# Patient Record
Sex: MALE | Race: WHITE | Employment: OTHER | URBAN - METROPOLITAN AREA
[De-identification: names, ages, dates, MRNs, and addresses within clinical notes are randomized per-mention and may not be internally consistent; named-entity substitution may affect disease eponyms.]

---

## 2018-06-25 ENCOUNTER — APPOINTMENT (OUTPATIENT)
Dept: GENERAL RADIOLOGY | Age: 63
DRG: 974 | End: 2018-06-25
Payer: MEDICARE

## 2018-06-25 ENCOUNTER — APPOINTMENT (OUTPATIENT)
Dept: CT IMAGING | Age: 63
DRG: 974 | End: 2018-06-25
Payer: MEDICARE

## 2018-06-25 ENCOUNTER — HOSPITAL ENCOUNTER (INPATIENT)
Age: 63
LOS: 2 days | Discharge: ANOTHER HOSPITAL | DRG: 974 | End: 2018-06-28
Attending: EMERGENCY MEDICINE | Admitting: INTERNAL MEDICINE
Payer: MEDICARE

## 2018-06-25 DIAGNOSIS — E78.5 HYPERLIPIDEMIA, UNSPECIFIED HYPERLIPIDEMIA TYPE: ICD-10-CM

## 2018-06-25 DIAGNOSIS — N28.9 RENAL INSUFFICIENCY: ICD-10-CM

## 2018-06-25 DIAGNOSIS — J18.9 COMMUNITY ACQUIRED PNEUMONIA, UNSPECIFIED LATERALITY: ICD-10-CM

## 2018-06-25 DIAGNOSIS — N40.0 PROSTATE ENLARGEMENT: ICD-10-CM

## 2018-06-25 DIAGNOSIS — B20 HIV (HUMAN IMMUNODEFICIENCY VIRUS INFECTION) (HCC): ICD-10-CM

## 2018-06-25 DIAGNOSIS — A41.9 SEPSIS, DUE TO UNSPECIFIED ORGANISM: Primary | ICD-10-CM

## 2018-06-25 LAB
ALBUMIN SERPL-MCNC: 4.6 G/DL (ref 3.5–5.2)
ALP BLD-CCNC: 71 U/L (ref 40–129)
ALT SERPL-CCNC: 18 U/L (ref 0–40)
ANION GAP SERPL CALCULATED.3IONS-SCNC: 14 MMOL/L (ref 7–16)
AST SERPL-CCNC: 22 U/L (ref 0–39)
BASOPHILS ABSOLUTE: 0.03 E9/L (ref 0–0.2)
BASOPHILS RELATIVE PERCENT: 0.5 % (ref 0–2)
BILIRUB SERPL-MCNC: 0.6 MG/DL (ref 0–1.2)
BUN BLDV-MCNC: 16 MG/DL (ref 8–23)
CALCIUM SERPL-MCNC: 8.7 MG/DL (ref 8.6–10.2)
CHLORIDE BLD-SCNC: 101 MMOL/L (ref 98–107)
CO2: 23 MMOL/L (ref 22–29)
CREAT SERPL-MCNC: 1.6 MG/DL (ref 0.7–1.2)
EKG ATRIAL RATE: 106 BPM
EKG P AXIS: 5 DEGREES
EKG P-R INTERVAL: 176 MS
EKG Q-T INTERVAL: 314 MS
EKG QRS DURATION: 82 MS
EKG QTC CALCULATION (BAZETT): 417 MS
EKG R AXIS: 50 DEGREES
EKG T AXIS: 33 DEGREES
EKG VENTRICULAR RATE: 106 BPM
EOSINOPHILS ABSOLUTE: 0.02 E9/L (ref 0.05–0.5)
EOSINOPHILS RELATIVE PERCENT: 0.3 % (ref 0–6)
GFR AFRICAN AMERICAN: 53
GFR NON-AFRICAN AMERICAN: 44 ML/MIN/1.73
GLUCOSE BLD-MCNC: 95 MG/DL (ref 74–109)
HCT VFR BLD CALC: 43.6 % (ref 37–54)
HEMOGLOBIN: 15.2 G/DL (ref 12.5–16.5)
IMMATURE GRANULOCYTES #: 0.02 E9/L
IMMATURE GRANULOCYTES %: 0.3 % (ref 0–5)
INR BLD: 2.5
LACTIC ACID, SEPSIS: 0.9 MMOL/L (ref 0.5–1.9)
LIPASE: 27 U/L (ref 13–60)
LYMPHOCYTES ABSOLUTE: 1.08 E9/L (ref 1.5–4)
LYMPHOCYTES RELATIVE PERCENT: 18.4 % (ref 20–42)
MCH RBC QN AUTO: 34.9 PG (ref 26–35)
MCHC RBC AUTO-ENTMCNC: 34.9 % (ref 32–34.5)
MCV RBC AUTO: 100 FL (ref 80–99.9)
MONOCYTES ABSOLUTE: 0.48 E9/L (ref 0.1–0.95)
MONOCYTES RELATIVE PERCENT: 8.2 % (ref 2–12)
NEUTROPHILS ABSOLUTE: 4.23 E9/L (ref 1.8–7.3)
NEUTROPHILS RELATIVE PERCENT: 72.3 % (ref 43–80)
PDW BLD-RTO: 14.1 FL (ref 11.5–15)
PLATELET # BLD: 152 E9/L (ref 130–450)
PMV BLD AUTO: 8.8 FL (ref 7–12)
POTASSIUM REFLEX MAGNESIUM: 3.8 MMOL/L (ref 3.5–5)
PRO-BNP: 63 PG/ML (ref 0–125)
PROTHROMBIN TIME: 28.2 SEC (ref 9.3–12.4)
RBC # BLD: 4.36 E12/L (ref 3.8–5.8)
SODIUM BLD-SCNC: 138 MMOL/L (ref 132–146)
TOTAL PROTEIN: 8.2 G/DL (ref 6.4–8.3)
TROPONIN: <0.01 NG/ML (ref 0–0.03)
WBC # BLD: 5.9 E9/L (ref 4.5–11.5)

## 2018-06-25 PROCEDURE — 80053 COMPREHEN METABOLIC PANEL: CPT

## 2018-06-25 PROCEDURE — 71275 CT ANGIOGRAPHY CHEST: CPT

## 2018-06-25 PROCEDURE — 85025 COMPLETE CBC W/AUTO DIFF WBC: CPT

## 2018-06-25 PROCEDURE — 93005 ELECTROCARDIOGRAM TRACING: CPT | Performed by: EMERGENCY MEDICINE

## 2018-06-25 PROCEDURE — 6370000000 HC RX 637 (ALT 250 FOR IP): Performed by: EMERGENCY MEDICINE

## 2018-06-25 PROCEDURE — 2700000000 HC OXYGEN THERAPY PER DAY

## 2018-06-25 PROCEDURE — 2500000003 HC RX 250 WO HCPCS: Performed by: EMERGENCY MEDICINE

## 2018-06-25 PROCEDURE — 94640 AIRWAY INHALATION TREATMENT: CPT

## 2018-06-25 PROCEDURE — 87040 BLOOD CULTURE FOR BACTERIA: CPT

## 2018-06-25 PROCEDURE — 71045 X-RAY EXAM CHEST 1 VIEW: CPT

## 2018-06-25 PROCEDURE — 83690 ASSAY OF LIPASE: CPT

## 2018-06-25 PROCEDURE — 99285 EMERGENCY DEPT VISIT HI MDM: CPT

## 2018-06-25 PROCEDURE — 2580000003 HC RX 258: Performed by: EMERGENCY MEDICINE

## 2018-06-25 PROCEDURE — 85610 PROTHROMBIN TIME: CPT

## 2018-06-25 PROCEDURE — 36415 COLL VENOUS BLD VENIPUNCTURE: CPT

## 2018-06-25 PROCEDURE — 6360000004 HC RX CONTRAST MEDICATION: Performed by: RADIOLOGY

## 2018-06-25 PROCEDURE — 83605 ASSAY OF LACTIC ACID: CPT

## 2018-06-25 PROCEDURE — 83880 ASSAY OF NATRIURETIC PEPTIDE: CPT

## 2018-06-25 PROCEDURE — 84484 ASSAY OF TROPONIN QUANT: CPT

## 2018-06-25 RX ORDER — SODIUM CHLORIDE 9 MG/ML
1000 INJECTION, SOLUTION INTRAVENOUS ONCE
Status: COMPLETED | OUTPATIENT
Start: 2018-06-25 | End: 2018-06-25

## 2018-06-25 RX ORDER — IPRATROPIUM BROMIDE AND ALBUTEROL SULFATE 2.5; .5 MG/3ML; MG/3ML
2 SOLUTION RESPIRATORY (INHALATION) ONCE
Status: COMPLETED | OUTPATIENT
Start: 2018-06-25 | End: 2018-06-25

## 2018-06-25 RX ORDER — ACETAMINOPHEN 500 MG
1000 TABLET ORAL ONCE
Status: COMPLETED | OUTPATIENT
Start: 2018-06-25 | End: 2018-06-25

## 2018-06-25 RX ORDER — LIDOCAINE HYDROCHLORIDE 10 MG/ML
5 INJECTION, SOLUTION EPIDURAL; INFILTRATION; INTRACAUDAL; PERINEURAL ONCE
Status: COMPLETED | OUTPATIENT
Start: 2018-06-25 | End: 2018-06-25

## 2018-06-25 RX ADMIN — IOPAMIDOL 80 ML: 755 INJECTION, SOLUTION INTRAVENOUS at 23:23

## 2018-06-25 RX ADMIN — SODIUM CHLORIDE 1000 ML: 9 INJECTION, SOLUTION INTRAVENOUS at 21:46

## 2018-06-25 RX ADMIN — LIDOCAINE HYDROCHLORIDE 5 ML: 10 INJECTION, SOLUTION EPIDURAL; INFILTRATION; INTRACAUDAL; PERINEURAL at 21:45

## 2018-06-25 RX ADMIN — ACETAMINOPHEN 1000 MG: 500 TABLET, FILM COATED ORAL at 21:46

## 2018-06-25 RX ADMIN — IPRATROPIUM BROMIDE AND ALBUTEROL SULFATE 2 AMPULE: .5; 3 SOLUTION RESPIRATORY (INHALATION) at 21:45

## 2018-06-25 ASSESSMENT — ENCOUNTER SYMPTOMS
COUGH: 1
EYE PAIN: 0
DIARRHEA: 0
EYE REDNESS: 0
WHEEZING: 0
BACK PAIN: 1
SORE THROAT: 0
ABDOMINAL PAIN: 1
SHORTNESS OF BREATH: 1
VOMITING: 0
NAUSEA: 0
EYE DISCHARGE: 0
SINUS PRESSURE: 0

## 2018-06-25 ASSESSMENT — PAIN DESCRIPTION - PROGRESSION: CLINICAL_PROGRESSION: GRADUALLY IMPROVING

## 2018-06-25 ASSESSMENT — PAIN SCALES - GENERAL
PAINLEVEL_OUTOF10: 5
PAINLEVEL_OUTOF10: 3

## 2018-06-25 ASSESSMENT — PAIN DESCRIPTION - DESCRIPTORS: DESCRIPTORS: HEADACHE

## 2018-06-25 ASSESSMENT — PAIN DESCRIPTION - PAIN TYPE: TYPE: ACUTE PAIN

## 2018-06-25 ASSESSMENT — PAIN DESCRIPTION - LOCATION: LOCATION: HEAD

## 2018-06-26 PROBLEM — A41.9 SEPSIS (HCC): Status: ACTIVE | Noted: 2018-06-26

## 2018-06-26 LAB
BACTERIA: NORMAL /HPF
BILIRUBIN URINE: NEGATIVE
BLOOD, URINE: ABNORMAL
CLARITY: CLEAR
COLOR: YELLOW
EPITHELIAL CELLS, UA: NORMAL /HPF
FILM ARRAY ADENOVIRUS: ABNORMAL
FILM ARRAY BORDETELLA PERTUSSIS: ABNORMAL
FILM ARRAY CHLAMYDOPHILIA PNEUMONIAE: ABNORMAL
FILM ARRAY CORONAVIRUS 229E: ABNORMAL
FILM ARRAY CORONAVIRUS HKU1: ABNORMAL
FILM ARRAY CORONAVIRUS NL63: ABNORMAL
FILM ARRAY CORONAVIRUS OC43: ABNORMAL
FILM ARRAY INFLUENZA A VIRUS 09H1: ABNORMAL
FILM ARRAY INFLUENZA A VIRUS H1: ABNORMAL
FILM ARRAY INFLUENZA A VIRUS H3: ABNORMAL
FILM ARRAY INFLUENZA A VIRUS: ABNORMAL
FILM ARRAY INFLUENZA B: ABNORMAL
FILM ARRAY METAPNEUMOVIRUS: ABNORMAL
FILM ARRAY MYCOPLASMA PNEUMONIAE: ABNORMAL
FILM ARRAY PARAINFLUENZA VIRUS 1: ABNORMAL
FILM ARRAY PARAINFLUENZA VIRUS 2: ABNORMAL
FILM ARRAY PARAINFLUENZA VIRUS 4: ABNORMAL
FILM ARRAY RESPIRATORY SYNCITIAL VIRUS: ABNORMAL
FILM ARRAY RHINOVIRUS/ENTEROVIRUS: ABNORMAL
GLUCOSE URINE: NEGATIVE MG/DL
INR BLD: 3
KETONES, URINE: NEGATIVE MG/DL
L. PNEUMOPHILA SEROGP 1 UR AG: NORMAL
LEUKOCYTE ESTERASE, URINE: NEGATIVE
NITRITE, URINE: NEGATIVE
ORGANISM: ABNORMAL
PH UA: 5.5 (ref 5–9)
PROTEIN UA: NEGATIVE MG/DL
PROTHROMBIN TIME: 34.3 SEC (ref 9.3–12.4)
RBC UA: NORMAL /HPF (ref 0–2)
SPECIFIC GRAVITY UA: 1.01 (ref 1–1.03)
STREP PNEUMONIAE ANTIGEN, URINE: NORMAL
UROBILINOGEN, URINE: 0.2 E.U./DL
WBC UA: NORMAL /HPF (ref 0–5)

## 2018-06-26 PROCEDURE — 94640 AIRWAY INHALATION TREATMENT: CPT

## 2018-06-26 PROCEDURE — 2580000003 HC RX 258

## 2018-06-26 PROCEDURE — 81001 URINALYSIS AUTO W/SCOPE: CPT

## 2018-06-26 PROCEDURE — 87503 INFLUENZA DNA AMP PROB ADDL: CPT

## 2018-06-26 PROCEDURE — 87486 CHLMYD PNEUM DNA AMP PROBE: CPT

## 2018-06-26 PROCEDURE — 6370000000 HC RX 637 (ALT 250 FOR IP): Performed by: INTERNAL MEDICINE

## 2018-06-26 PROCEDURE — 6360000002 HC RX W HCPCS: Performed by: INTERNAL MEDICINE

## 2018-06-26 PROCEDURE — 87581 M.PNEUMON DNA AMP PROBE: CPT

## 2018-06-26 PROCEDURE — 2700000000 HC OXYGEN THERAPY PER DAY

## 2018-06-26 PROCEDURE — 6360000002 HC RX W HCPCS: Performed by: EMERGENCY MEDICINE

## 2018-06-26 PROCEDURE — 87088 URINE BACTERIA CULTURE: CPT

## 2018-06-26 PROCEDURE — 87798 DETECT AGENT NOS DNA AMP: CPT

## 2018-06-26 PROCEDURE — 1200000000 HC SEMI PRIVATE

## 2018-06-26 PROCEDURE — 2580000003 HC RX 258: Performed by: EMERGENCY MEDICINE

## 2018-06-26 PROCEDURE — 87450 HC DIRECT STREP B ANTIGEN: CPT

## 2018-06-26 PROCEDURE — 36415 COLL VENOUS BLD VENIPUNCTURE: CPT

## 2018-06-26 PROCEDURE — 85610 PROTHROMBIN TIME: CPT

## 2018-06-26 PROCEDURE — 87502 INFLUENZA DNA AMP PROBE: CPT

## 2018-06-26 RX ORDER — OSELTAMIVIR PHOSPHATE 75 MG/1
75 CAPSULE ORAL 2 TIMES DAILY
Status: DISCONTINUED | OUTPATIENT
Start: 2018-06-26 | End: 2018-06-27

## 2018-06-26 RX ORDER — VITAMIN B COMPLEX
1 CAPSULE ORAL DAILY
COMMUNITY
End: 2019-03-19 | Stop reason: ALTCHOICE

## 2018-06-26 RX ORDER — DICYCLOMINE HCL 20 MG
20 TABLET ORAL EVERY 6 HOURS PRN
Status: DISCONTINUED | OUTPATIENT
Start: 2018-06-26 | End: 2018-06-28 | Stop reason: HOSPADM

## 2018-06-26 RX ORDER — HYDROCHLOROTHIAZIDE 12.5 MG/1
12.5 TABLET ORAL DAILY
Status: DISCONTINUED | OUTPATIENT
Start: 2018-06-26 | End: 2018-06-28 | Stop reason: HOSPADM

## 2018-06-26 RX ORDER — ACETAMINOPHEN,DIPHENHYDRAMINE HCL 500; 25 MG/1; MG/1
2 TABLET, FILM COATED ORAL NIGHTLY PRN
COMMUNITY
End: 2020-10-01 | Stop reason: ALTCHOICE

## 2018-06-26 RX ORDER — IPRATROPIUM BROMIDE AND ALBUTEROL SULFATE 2.5; .5 MG/3ML; MG/3ML
1 SOLUTION RESPIRATORY (INHALATION) EVERY 4 HOURS PRN
Status: DISCONTINUED | OUTPATIENT
Start: 2018-06-26 | End: 2018-06-28 | Stop reason: HOSPADM

## 2018-06-26 RX ORDER — TAMSULOSIN HYDROCHLORIDE 0.4 MG/1
0.8 CAPSULE ORAL DAILY
Status: DISCONTINUED | OUTPATIENT
Start: 2018-06-26 | End: 2018-06-28 | Stop reason: HOSPADM

## 2018-06-26 RX ORDER — CYCLOBENZAPRINE HCL 10 MG
10 TABLET ORAL 2 TIMES DAILY PRN
Status: DISCONTINUED | OUTPATIENT
Start: 2018-06-26 | End: 2018-06-28 | Stop reason: HOSPADM

## 2018-06-26 RX ORDER — ACETAMINOPHEN 500 MG
500 TABLET ORAL EVERY 6 HOURS PRN
Status: DISCONTINUED | OUTPATIENT
Start: 2018-06-26 | End: 2018-06-28 | Stop reason: HOSPADM

## 2018-06-26 RX ORDER — OMEPRAZOLE 20 MG/1
20 CAPSULE, DELAYED RELEASE ORAL DAILY
COMMUNITY

## 2018-06-26 RX ORDER — LEVOFLOXACIN 5 MG/ML
750 INJECTION, SOLUTION INTRAVENOUS EVERY 24 HOURS
Status: DISCONTINUED | OUTPATIENT
Start: 2018-06-26 | End: 2018-06-28 | Stop reason: HOSPADM

## 2018-06-26 RX ORDER — IPRATROPIUM BROMIDE AND ALBUTEROL SULFATE 2.5; .5 MG/3ML; MG/3ML
1 SOLUTION RESPIRATORY (INHALATION) EVERY 4 HOURS
Status: DISCONTINUED | OUTPATIENT
Start: 2018-06-26 | End: 2018-06-28 | Stop reason: HOSPADM

## 2018-06-26 RX ORDER — BUDESONIDE 0.5 MG/2ML
500 INHALANT ORAL 2 TIMES DAILY
Status: DISCONTINUED | OUTPATIENT
Start: 2018-06-26 | End: 2018-06-28 | Stop reason: HOSPADM

## 2018-06-26 RX ORDER — WARFARIN SODIUM 5 MG/1
5 TABLET ORAL DAILY
Status: DISCONTINUED | OUTPATIENT
Start: 2018-06-26 | End: 2018-06-27

## 2018-06-26 RX ORDER — SODIUM CHLORIDE 0.9 % (FLUSH) 0.9 %
SYRINGE (ML) INJECTION
Status: COMPLETED
Start: 2018-06-26 | End: 2018-06-26

## 2018-06-26 RX ORDER — HYDROCHLOROTHIAZIDE 12.5 MG/1
12.5 CAPSULE, GELATIN COATED ORAL DAILY
Status: DISCONTINUED | OUTPATIENT
Start: 2018-06-26 | End: 2018-06-26 | Stop reason: CLARIF

## 2018-06-26 RX ORDER — FAMOTIDINE 20 MG/1
20 TABLET, FILM COATED ORAL 2 TIMES DAILY
Status: DISCONTINUED | OUTPATIENT
Start: 2018-06-26 | End: 2018-06-28 | Stop reason: HOSPADM

## 2018-06-26 RX ORDER — GUAIFENESIN 600 MG/1
600 TABLET, EXTENDED RELEASE ORAL 2 TIMES DAILY
Status: DISCONTINUED | OUTPATIENT
Start: 2018-06-26 | End: 2018-06-28 | Stop reason: HOSPADM

## 2018-06-26 RX ORDER — EFAVIRENZ, EMTRICITABINE AND TENOFOVIR DISOPROXIL FUMARATE 600; 200; 300 MG/1; MG/1; MG/1
1 TABLET, FILM COATED ORAL NIGHTLY
Status: DISCONTINUED | OUTPATIENT
Start: 2018-06-26 | End: 2018-06-27

## 2018-06-26 RX ORDER — FENTANYL CITRATE 50 UG/ML
50 INJECTION, SOLUTION INTRAMUSCULAR; INTRAVENOUS ONCE
Status: COMPLETED | OUTPATIENT
Start: 2018-06-26 | End: 2018-06-26

## 2018-06-26 RX ORDER — SODIUM CHLORIDE AND POTASSIUM CHLORIDE .9; .15 G/100ML; G/100ML
SOLUTION INTRAVENOUS CONTINUOUS
Status: DISCONTINUED | OUTPATIENT
Start: 2018-06-26 | End: 2018-06-28 | Stop reason: HOSPADM

## 2018-06-26 RX ORDER — ONDANSETRON 2 MG/ML
4 INJECTION INTRAMUSCULAR; INTRAVENOUS EVERY 6 HOURS PRN
Status: DISCONTINUED | OUTPATIENT
Start: 2018-06-26 | End: 2018-06-28 | Stop reason: HOSPADM

## 2018-06-26 RX ORDER — ATORVASTATIN CALCIUM 40 MG/1
40 TABLET, FILM COATED ORAL NIGHTLY
Status: DISCONTINUED | OUTPATIENT
Start: 2018-06-26 | End: 2018-06-28 | Stop reason: HOSPADM

## 2018-06-26 RX ADMIN — POTASSIUM CHLORIDE AND SODIUM CHLORIDE: 900; 150 INJECTION, SOLUTION INTRAVENOUS at 12:07

## 2018-06-26 RX ADMIN — POTASSIUM CHLORIDE AND SODIUM CHLORIDE: 900; 150 INJECTION, SOLUTION INTRAVENOUS at 02:12

## 2018-06-26 RX ADMIN — IPRATROPIUM BROMIDE AND ALBUTEROL SULFATE 1 AMPULE: .5; 3 SOLUTION RESPIRATORY (INHALATION) at 18:45

## 2018-06-26 RX ADMIN — IPRATROPIUM BROMIDE AND ALBUTEROL SULFATE 1 AMPULE: .5; 3 SOLUTION RESPIRATORY (INHALATION) at 13:05

## 2018-06-26 RX ADMIN — ATORVASTATIN CALCIUM 40 MG: 40 TABLET, FILM COATED ORAL at 20:08

## 2018-06-26 RX ADMIN — BUDESONIDE 500 MCG: 0.5 SUSPENSION RESPIRATORY (INHALATION) at 18:46

## 2018-06-26 RX ADMIN — GUAIFENESIN 600 MG: 600 TABLET, EXTENDED RELEASE ORAL at 20:09

## 2018-06-26 RX ADMIN — HYDROCHLOROTHIAZIDE 12.5 MG: 12.5 TABLET ORAL at 09:46

## 2018-06-26 RX ADMIN — FENTANYL CITRATE 50 MCG: 50 INJECTION INTRAMUSCULAR; INTRAVENOUS at 00:44

## 2018-06-26 RX ADMIN — OSELTAMIVIR PHOSPHATE 75 MG: 75 CAPSULE ORAL at 20:09

## 2018-06-26 RX ADMIN — FAMOTIDINE 20 MG: 20 TABLET ORAL at 09:46

## 2018-06-26 RX ADMIN — IPRATROPIUM BROMIDE AND ALBUTEROL SULFATE 1 AMPULE: .5; 3 SOLUTION RESPIRATORY (INHALATION) at 05:07

## 2018-06-26 RX ADMIN — WARFARIN SODIUM 5 MG: 5 TABLET ORAL at 19:00

## 2018-06-26 RX ADMIN — AZITHROMYCIN MONOHYDRATE 500 MG: 500 INJECTION, POWDER, LYOPHILIZED, FOR SOLUTION INTRAVENOUS at 00:39

## 2018-06-26 RX ADMIN — FAMOTIDINE 20 MG: 20 TABLET ORAL at 20:09

## 2018-06-26 RX ADMIN — IPRATROPIUM BROMIDE AND ALBUTEROL SULFATE 1 AMPULE: .5; 3 SOLUTION RESPIRATORY (INHALATION) at 22:27

## 2018-06-26 RX ADMIN — OSELTAMIVIR PHOSPHATE 75 MG: 75 CAPSULE ORAL at 15:05

## 2018-06-26 RX ADMIN — Medication 10 ML: at 02:13

## 2018-06-26 RX ADMIN — LEVOFLOXACIN 750 MG: 5 INJECTION, SOLUTION INTRAVENOUS at 15:06

## 2018-06-26 RX ADMIN — BUDESONIDE 500 MCG: 0.5 SUSPENSION RESPIRATORY (INHALATION) at 05:07

## 2018-06-26 RX ADMIN — ACETAMINOPHEN 500 MG: 500 TABLET ORAL at 08:04

## 2018-06-26 RX ADMIN — VITAMIN D 1000 UNITS: 25 TAB ORAL at 09:46

## 2018-06-26 RX ADMIN — WARFARIN SODIUM 5 MG: 5 TABLET ORAL at 02:19

## 2018-06-26 RX ADMIN — TAMSULOSIN HYDROCHLORIDE 0.8 MG: 0.4 CAPSULE ORAL at 09:46

## 2018-06-26 RX ADMIN — CEFTRIAXONE 2 G: 2 INJECTION, POWDER, FOR SOLUTION INTRAMUSCULAR; INTRAVENOUS at 00:45

## 2018-06-26 RX ADMIN — GUAIFENESIN 600 MG: 600 TABLET, EXTENDED RELEASE ORAL at 02:19

## 2018-06-26 RX ADMIN — GUAIFENESIN 600 MG: 600 TABLET, EXTENDED RELEASE ORAL at 09:46

## 2018-06-26 ASSESSMENT — PAIN DESCRIPTION - PROGRESSION: CLINICAL_PROGRESSION: GRADUALLY WORSENING

## 2018-06-26 ASSESSMENT — PAIN SCALES - GENERAL
PAINLEVEL_OUTOF10: 7
PAINLEVEL_OUTOF10: 8
PAINLEVEL_OUTOF10: 6
PAINLEVEL_OUTOF10: 0

## 2018-06-26 ASSESSMENT — PAIN DESCRIPTION - DESCRIPTORS: DESCRIPTORS: ACHING;CONSTANT;DISCOMFORT

## 2018-06-26 ASSESSMENT — PAIN DESCRIPTION - ORIENTATION: ORIENTATION: ANTERIOR;LOWER;MID

## 2018-06-26 ASSESSMENT — PAIN DESCRIPTION - LOCATION: LOCATION: HEAD;BACK

## 2018-06-26 ASSESSMENT — PAIN DESCRIPTION - ONSET: ONSET: ON-GOING

## 2018-06-26 ASSESSMENT — PAIN DESCRIPTION - PAIN TYPE: TYPE: ACUTE PAIN

## 2018-06-26 ASSESSMENT — PAIN DESCRIPTION - FREQUENCY: FREQUENCY: CONTINUOUS

## 2018-06-26 NOTE — H&P
06/26/2018    PHUR 5.5 06/26/2018    WBCUA NONE 06/26/2018    RBCUA 0-1 06/26/2018    RBCUA >20 02/21/2014    BACTERIA NONE 06/26/2018    CLARITYU Clear 06/26/2018    SPECGRAV 1.015 06/26/2018    LEUKOCYTESUR Negative 06/26/2018    UROBILINOGEN 0.2 06/26/2018    BILIRUBINUR Negative 06/26/2018    BLOODU SMALL 06/26/2018    GLUCOSEU Negative 06/26/2018    AMORPHOUS MANY 11/14/2015     ABG:  No results found for: PH, PCO2, PO2, HCO3, BE, THGB, TCO2, O2SAT  HgBA1c:  No results found for: LABA1C  FLP:  No results found for: TRIG, HDL, LDLCALC, LDLDIRECT, LABVLDL  TSH:  No results found for: TSH  IRON:  No results found for: IRON  LIPASE:    Lab Results   Component Value Date    LIPASE 27 06/25/2018       ASSESSMENT AND PLAN:      Patient Active Problem List    Diagnosis Date Noted    Sepsis ([facility] 06/26/2018    Prostate enlargement     Hyperlipidemia     HIV (human immunodeficiency virus infection) ([facility] [facility]     DVT of lower extremity (deep venous thrombosis) (HCC)          COPD exacerbation        Acute hypoxic respiratory failure    -Consult ID  -DuoNeb and Pulmicort aerosols  -Lovenox  -IV Levaquin and Rocephin  -Labs in jenni Krueger D.O.  6/26/2018  2:24 PM

## 2018-06-26 NOTE — ED NOTES
Patient reminded that we still need a urine sample, patient handed a urinal and is attempting to provide a urine sample at this time.       Alexandra Lloyd RN  06/25/18 3582

## 2018-06-26 NOTE — CARE COORDINATION
Ss note:6/26/2018.12:44 PM Pt is a . Sw met with pt for transition of care needs. Pt relays he resides home alone in apt with elevator. PTA pt uses a straight cane, is independent with ADLS and IADLS and drives. Pt sister Gustavo is involved and speaks with pt daily. Pt relays no hx of hhc or snf, just [facility] for rehab. Pt follows with Dr. Melody Barbour at the [place] and obtains medications thru [place]. Pt does NOT have home oxygen, has not had it in the past either. Pt is currently on 5 liters and if home oxygen is required the [place] needs 24 hour notice to arrange services with an o2 order. SW will follow.  ERNESTO Bell

## 2018-06-26 NOTE — ED PROVIDER NOTES
Patient is a 56 y/o male who presents to the ED via EMS with a cough and shortness of breath. Patient states the cough began 3 days ago. He has had gradually increasing shortness of breath. He believes he had a fever last night but did not take his temperature. When he coughs, his abdomen, lower back and head hurts. He denies any chest pain. The history is provided by the patient. Review of Systems   Constitutional: Positive for fever. Negative for chills. HENT: Negative for ear pain, sinus pressure and sore throat. Eyes: Negative for pain, discharge and redness. Respiratory: Positive for cough and shortness of breath. Negative for wheezing. Cardiovascular: Negative for chest pain. Gastrointestinal: Positive for abdominal pain. Negative for diarrhea, nausea and vomiting. Genitourinary: Negative for dysuria and frequency. Musculoskeletal: Positive for back pain. Negative for arthralgias. Skin: Negative for rash and wound. Neurological: Positive for headaches. Negative for weakness. Hematological: Negative for adenopathy. All other systems reviewed and are negative. Physical Exam   Constitutional: He is oriented to person, place, and time. He appears well-developed and well-nourished. No distress. HENT:   Head: Normocephalic and atraumatic. Right Ear: External ear normal.   Left Ear: External ear normal.   Nose: Nose normal.   Mouth/Throat: Oropharynx is clear and moist.   Eyes: Conjunctivae are normal. Pupils are equal, round, and reactive to light. Neck: Normal range of motion. Neck supple. Cardiovascular: Regular rhythm, normal heart sounds and intact distal pulses. Tachycardia present. No murmur heard. Pulmonary/Chest: Effort normal and breath sounds normal. No respiratory distress. He has no wheezes. He has no rales. Abdominal: Soft. Bowel sounds are normal. He exhibits no distension. There is no tenderness. There is no rebound and no guarding.

## 2018-06-26 NOTE — PROGRESS NOTES
Patient home medications reconciled  with admission orders. Patient currently taking Abacavir-Dolutegravir-Lamivud (TRIUMEQ) 600- MG TABS  1 PO QHS(home medication brought to pharmacy for verification.)  Order needed for this medication as therapeutic interchange is not available. Please discontinue order for   efavirenz-emtrictabine-tenofovir (ATRIPLA) 600-200-300 MG per tablet 1 tablet. Thank you.   Radha Friend [facility]  6/26/2018, 11:15 AM

## 2018-06-27 LAB
ALBUMIN SERPL-MCNC: 4 G/DL (ref 3.5–5.2)
ALP BLD-CCNC: 56 U/L (ref 40–129)
ALT SERPL-CCNC: 17 U/L (ref 0–40)
ANION GAP SERPL CALCULATED.3IONS-SCNC: 12 MMOL/L (ref 7–16)
AST SERPL-CCNC: 31 U/L (ref 0–39)
BASOPHILS ABSOLUTE: 0 E9/L (ref 0–0.2)
BASOPHILS RELATIVE PERCENT: 0 % (ref 0–2)
BILIRUB SERPL-MCNC: 0.4 MG/DL (ref 0–1.2)
BUN BLDV-MCNC: 16 MG/DL (ref 8–23)
CALCIUM SERPL-MCNC: 8.5 MG/DL (ref 8.6–10.2)
CHLORIDE BLD-SCNC: 104 MMOL/L (ref 98–107)
CHOLESTEROL, TOTAL: 152 MG/DL (ref 0–199)
CO2: 21 MMOL/L (ref 22–29)
CREAT SERPL-MCNC: 1.4 MG/DL (ref 0.7–1.2)
EOSINOPHILS ABSOLUTE: 0 E9/L (ref 0.05–0.5)
EOSINOPHILS RELATIVE PERCENT: 0 % (ref 0–6)
GFR AFRICAN AMERICAN: >60
GFR NON-AFRICAN AMERICAN: 51 ML/MIN/1.73
GLUCOSE BLD-MCNC: 105 MG/DL (ref 74–109)
HCT VFR BLD CALC: 38.2 % (ref 37–54)
HDLC SERPL-MCNC: 29 MG/DL
HEMOGLOBIN: 13.4 G/DL (ref 12.5–16.5)
INR BLD: 5.3
LDL CHOLESTEROL CALCULATED: 104 MG/DL (ref 0–99)
LYMPHOCYTES ABSOLUTE: 0.78 E9/L (ref 1.5–4)
LYMPHOCYTES RELATIVE PERCENT: 16 % (ref 20–42)
MCH RBC QN AUTO: 34.7 PG (ref 26–35)
MCHC RBC AUTO-ENTMCNC: 35.1 % (ref 32–34.5)
MCV RBC AUTO: 99 FL (ref 80–99.9)
MONOCYTES ABSOLUTE: 0.39 E9/L (ref 0.1–0.95)
MONOCYTES RELATIVE PERCENT: 8 % (ref 2–12)
NEUTROPHILS ABSOLUTE: 3.72 E9/L (ref 1.8–7.3)
NEUTROPHILS RELATIVE PERCENT: 76 % (ref 43–80)
PDW BLD-RTO: 14 FL (ref 11.5–15)
PLATELET # BLD: 131 E9/L (ref 130–450)
PMV BLD AUTO: 8.6 FL (ref 7–12)
POTASSIUM SERPL-SCNC: 3.9 MMOL/L (ref 3.5–5)
PROCALCITONIN: 0.15 NG/ML (ref 0–0.08)
PROTHROMBIN TIME: 59 SEC (ref 9.3–12.4)
RBC # BLD: 3.86 E12/L (ref 3.8–5.8)
SODIUM BLD-SCNC: 137 MMOL/L (ref 132–146)
TOTAL PROTEIN: 7.4 G/DL (ref 6.4–8.3)
TRIGL SERPL-MCNC: 96 MG/DL (ref 0–149)
TSH SERPL DL<=0.05 MIU/L-ACNC: 1.68 UIU/ML (ref 0.27–4.2)
VLDLC SERPL CALC-MCNC: 19 MG/DL
WBC # BLD: 4.9 E9/L (ref 4.5–11.5)

## 2018-06-27 PROCEDURE — 94640 AIRWAY INHALATION TREATMENT: CPT

## 2018-06-27 PROCEDURE — 36415 COLL VENOUS BLD VENIPUNCTURE: CPT

## 2018-06-27 PROCEDURE — 84145 PROCALCITONIN (PCT): CPT

## 2018-06-27 PROCEDURE — 84443 ASSAY THYROID STIM HORMONE: CPT

## 2018-06-27 PROCEDURE — 1200000000 HC SEMI PRIVATE

## 2018-06-27 PROCEDURE — 6360000002 HC RX W HCPCS: Performed by: INTERNAL MEDICINE

## 2018-06-27 PROCEDURE — 85025 COMPLETE CBC W/AUTO DIFF WBC: CPT

## 2018-06-27 PROCEDURE — 6370000000 HC RX 637 (ALT 250 FOR IP): Performed by: INTERNAL MEDICINE

## 2018-06-27 PROCEDURE — 2700000000 HC OXYGEN THERAPY PER DAY

## 2018-06-27 PROCEDURE — 80061 LIPID PANEL: CPT

## 2018-06-27 PROCEDURE — 2580000003 HC RX 258: Performed by: INTERNAL MEDICINE

## 2018-06-27 PROCEDURE — 85610 PROTHROMBIN TIME: CPT

## 2018-06-27 PROCEDURE — 80053 COMPREHEN METABOLIC PANEL: CPT

## 2018-06-27 RX ORDER — CALCIUM CARBONATE 200(500)MG
500 TABLET,CHEWABLE ORAL 3 TIMES DAILY PRN
Status: DISCONTINUED | OUTPATIENT
Start: 2018-06-27 | End: 2018-06-28 | Stop reason: HOSPADM

## 2018-06-27 RX ADMIN — FAMOTIDINE 20 MG: 20 TABLET ORAL at 19:59

## 2018-06-27 RX ADMIN — POTASSIUM CHLORIDE AND SODIUM CHLORIDE: 900; 150 INJECTION, SOLUTION INTRAVENOUS at 09:35

## 2018-06-27 RX ADMIN — IPRATROPIUM BROMIDE AND ALBUTEROL SULFATE 1 AMPULE: .5; 3 SOLUTION RESPIRATORY (INHALATION) at 07:08

## 2018-06-27 RX ADMIN — LEVOFLOXACIN 750 MG: 5 INJECTION, SOLUTION INTRAVENOUS at 14:31

## 2018-06-27 RX ADMIN — BUDESONIDE 500 MCG: 0.5 SUSPENSION RESPIRATORY (INHALATION) at 07:08

## 2018-06-27 RX ADMIN — GUAIFENESIN 600 MG: 600 TABLET, EXTENDED RELEASE ORAL at 09:36

## 2018-06-27 RX ADMIN — TAMSULOSIN HYDROCHLORIDE 0.8 MG: 0.4 CAPSULE ORAL at 09:36

## 2018-06-27 RX ADMIN — CALCIUM CARBONATE (ANTACID) CHEW TAB 500 MG 500 MG: 500 CHEW TAB at 21:11

## 2018-06-27 RX ADMIN — VITAMIN D 1000 UNITS: 25 TAB ORAL at 09:36

## 2018-06-27 RX ADMIN — GUAIFENESIN 600 MG: 600 TABLET, EXTENDED RELEASE ORAL at 19:59

## 2018-06-27 RX ADMIN — HYDROCHLOROTHIAZIDE 12.5 MG: 12.5 TABLET ORAL at 09:36

## 2018-06-27 RX ADMIN — BUDESONIDE 500 MCG: 0.5 SUSPENSION RESPIRATORY (INHALATION) at 18:56

## 2018-06-27 RX ADMIN — IPRATROPIUM BROMIDE AND ALBUTEROL SULFATE 1 AMPULE: .5; 3 SOLUTION RESPIRATORY (INHALATION) at 14:49

## 2018-06-27 RX ADMIN — IPRATROPIUM BROMIDE AND ALBUTEROL SULFATE 1 AMPULE: .5; 3 SOLUTION RESPIRATORY (INHALATION) at 02:03

## 2018-06-27 RX ADMIN — IPRATROPIUM BROMIDE AND ALBUTEROL SULFATE 1 AMPULE: .5; 3 SOLUTION RESPIRATORY (INHALATION) at 10:07

## 2018-06-27 RX ADMIN — OSELTAMIVIR PHOSPHATE 75 MG: 75 CAPSULE ORAL at 14:31

## 2018-06-27 RX ADMIN — FAMOTIDINE 20 MG: 20 TABLET ORAL at 09:36

## 2018-06-27 RX ADMIN — IPRATROPIUM BROMIDE AND ALBUTEROL SULFATE 1 AMPULE: .5; 3 SOLUTION RESPIRATORY (INHALATION) at 22:45

## 2018-06-27 RX ADMIN — PHYTONADIONE 5 MG: 10 INJECTION, EMULSION INTRAMUSCULAR; INTRAVENOUS; SUBCUTANEOUS at 18:22

## 2018-06-27 RX ADMIN — IPRATROPIUM BROMIDE AND ALBUTEROL SULFATE 1 AMPULE: .5; 3 SOLUTION RESPIRATORY (INHALATION) at 18:56

## 2018-06-27 RX ADMIN — CEFTRIAXONE 2 G: 2 INJECTION, POWDER, FOR SOLUTION INTRAMUSCULAR; INTRAVENOUS at 01:34

## 2018-06-27 RX ADMIN — ATORVASTATIN CALCIUM 40 MG: 40 TABLET, FILM COATED ORAL at 19:59

## 2018-06-27 ASSESSMENT — PAIN SCALES - GENERAL
PAINLEVEL_OUTOF10: 0

## 2018-06-27 NOTE — CONSULTS
[address] [place] [address] [facility]  Consult Note    [address] [facility] [address]  [place], [address] [place]  Phone [phone]   Fax [phone] Date: 6/25/2018  9:01 PM  Pt Name: Aditya Hernandez  MRN: [id number]  1955      Reason for Consult:    Chief Complaint   Patient presents with    Cough    Shortness of Breath     Requesting Physician:  Shawanda Cunningham DO  PCP: Livier Richardson MD    History Obtained From:  patient  ID consulted for HIV on hospital day [facility]       Chief Complaint   Patient presents with    Cough    Shortness of Breath       HISTORY OF PRESENT ILLNESS      Aditya Hernandez is a 57 y.o. male who presents with significant past medical history of  has a past medical history of DVT of lower extremity (deep venous thrombosis) ([facility] [facility] HIV (human immunodeficiency virus infection) ([facility] [address] [facility] Hyperlipidemia; and Prostate enlargement. who presents with   Chief Complaint   Patient presents with    Cough    Shortness of Breath       ED TRIAGE VITALS  BP: 124/78, Temp: 97.8 °F (36.6 °C), Pulse: 88, Resp: 20, SpO2: 95 %     HPI  Pt came in with sob/cough 4 days PTA. He has productive cough whitish. He had f/c. No diarrhea/rash /itch  hiv dx 5 years ago dx in [place]. Had ln biopsy. CXRY NEG   CT chest  1. There is no evidence of a pulmonary embolus. .  2. Very minimal and vague patchy infiltrates within the right upper  lobe and left lower lobe posteriorly. These findings are favored to  represent early pneumonia. He received azithromycin/ceftriaxone/levaquin/tamilfu  HIV dx 2006/2008 acguired through sex/heterosexual   He has h/o substance abuse  No blood transfusion     CURRENT MEDICATIONS       No current facility-administered medications on file prior to encounter.       Current Outpatient Prescriptions on File Prior to Encounter   Medication Sig Dispense Refill    rosuvastatin (CRESTOR) 40 MG tablet Take 40 mg by mouth every evening      infection.  Infection due  to S. pneumoniae cannot be ruled out since the antigen present  in the sample may be below the detection limit of the test.     Lactate, Sepsis   Result Value Ref Range    Lactic Acid, Sepsis 0.9 0.5 - 1.9 mmol/L   CBC auto differential   Result Value Ref Range    WBC 5.9 4.5 - 11.5 E9/L    RBC 4.36 3.80 - 5.80 E12/L    Hemoglobin 15.2 12.5 - 16.5 g/dL    Hematocrit 43.6 37.0 - 54.0 %    .0 (H) 80.0 - 99.9 fL    MCH 34.9 26.0 - 35.0 pg    MCHC 34.9 (H) 32.0 - 34.5 %    RDW 14.1 11.5 - 15.0 fL    Platelets 381 986 - 426 E9/L    MPV 8.8 7.0 - 12.0 fL    Neutrophils % 72.3 43.0 - 80.0 %    Immature Granulocytes % 0.3 0.0 - 5.0 %    Lymphocytes % 18.4 (L) 20.0 - 42.0 %    Monocytes % 8.2 2.0 - 12.0 %    Eosinophils % 0.3 0.0 - 6.0 %    Basophils % 0.5 0.0 - 2.0 %    Neutrophils # 4.23 1.80 - 7.30 E9/L    Immature Granulocytes # 0.02 E9/L    Lymphocytes # 1.08 (L) 1.50 - 4.00 E9/L    Monocytes # 0.48 0.10 - 0.95 E9/L    Eosinophils # 0.02 (L) 0.05 - 0.50 E9/L    Basophils # 0.03 0.00 - 0.20 E9/L   Comprehensive Metabolic Panel w/ Reflex to MG   Result Value Ref Range    Sodium 138 132 - 146 mmol/L    Potassium reflex Magnesium 3.8 3.5 - 5.0 mmol/L    Chloride 101 98 - 107 mmol/L    CO2 23 22 - 29 mmol/L    Anion Gap 14 7 - 16 mmol/L    Glucose 95 74 - 109 mg/dL    BUN 16 8 - 23 mg/dL    CREATININE 1.6 (H) 0.7 - 1.2 mg/dL    GFR Non-African American 44 >=60 mL/min/1.73    GFR African American 53     Calcium 8.7 8.6 - 10.2 mg/dL    Total Protein 8.2 6.4 - 8.3 g/dL    Alb 4.6 3.5 - 5.2 g/dL    Total Bilirubin 0.6 0.0 - 1.2 mg/dL    Alkaline Phosphatase 71 40 - 129 U/L    ALT 18 0 - 40 U/L    AST 22 0 - 39 U/L   Urinalysis   Result Value Ref Range    Color, UA Yellow Straw/Yellow    Clarity, UA Clear Clear    Glucose, Ur Negative Negative mg/dL    Bilirubin Urine Negative Negative    Ketones, Urine Negative Negative mg/dL    Specific Gravity, UA 1.015 1.005 - 1.030    Blood, Urine SMALL (A)

## 2018-06-27 NOTE — PROGRESS NOTES
[facility] Quality Flow/Interdisciplinary Rounds Progress Note        Quality Flow Rounds held on June 27, 2018    Disciplines Attending:  Bedside Nurse, ,  and Nursing Unit Leadership    Marylene Flemings was admitted on 6/25/2018  9:01 PM    Anticipated Discharge Date:  Expected Discharge Date: 06/28/18    Disposition:    Milton Score:  Milton Scale Score: 20    Readmission Risk              Risk of Unplanned Readmission:        11             Discussed patient goal for the day, patient clinical progression, and barriers to discharge.   The following Goal(s) of the Day/Commitment(s) have been identified:  INR 5.3, activity progression, IV F,       Isaias Steward  June 27, 2018

## 2018-06-27 NOTE — PLAN OF CARE
Problem: Nutrition  Goal: Optimal nutrition therapy  Outcome: Ongoing  Nutrition Problem: Inadequate oral intake  Intervention: Food and/or Nutrient Delivery: Continue current diet, Start ONS (Ensure Enlive 2x/day)  Nutritional Goals: % current diet and supplement with good tolerance

## 2018-06-27 NOTE — PROGRESS NOTES
Nutrition Assessment    Type and Reason for Visit: Positive Nutrition Screen    Nutrition Recommendations: Recommend and start Ensure Enlive 2x/day     Malnutrition Assessment:  · Malnutrition Status: No malnutrition  · Context: Acute illness or injury  · Findings of the 6 clinical characteristics of malnutrition (Minimum of 2 out of 6 clinical characteristics is required to make the diagnosis of moderate or severe Protein Calorie Malnutrition based on AND/ASPEN Guidelines):  1. Energy Intake-Less than or equal to 75%,  (4 days)    2. Weight Loss-No significant weight loss,    3. Fat Loss-No significant subcutaneous fat loss,    4. Muscle Loss-No significant muscle mass loss,    5. Fluid Accumulation-No significant fluid accumulation,    6.   Strength-Not measured    Nutrition Diagnosis:   · Problem: Inadequate oral intake  · Etiology: related to Impaired respiratory function-inability to consume food     Signs and symptoms:  as evidenced by Intake 50-75%, Diet history of poor intake, Patient report of (Decreased appetite due to SOB)    Nutrition Assessment:  · Subjective Assessment: Patient states poor appetite currently and x 3 days PTA d/t respiratory difficulties  · Nutrition-Focused Physical Findings: No evidence of wasting, dyspnea, abdomen soft/rounded, bowel sounds active, trace edema RLE, -1.8 I&O since admission  · Wound Type: None  · Current Nutrition Therapies:  · Oral Diet Orders: General   · Oral Diet intake: Select, 51-75%  · Oral Nutrition Supplement (ONS) Orders: None  · Anthropometric Measures:  · Ht: 6' 5\" (195.6 cm)   · Current Body Wt: 267 lb (121.1 kg) (6/27, bed scale)  · Admission Body Wt: 277 lb (125.6 kg) (6/26, no method)  · Usual Body Wt: 270 lb (122.5 kg) (215 lb: 5/3/16, no method, weight per patient)  · % Weight Change: No significant change (Limited weight history per EMR),     · Ideal Body Wt: 208 lb (94.3 kg), % Ideal Body 128%  · Adjusted Body Wt: 223 lb (101.2 kg), body weight

## 2018-06-27 NOTE — PROGRESS NOTES
Component Value Date    COLORU Yellow 06/26/2018    PROTEINU Negative 06/26/2018    PHUR 5.5 06/26/2018    WBCUA NONE 06/26/2018    RBCUA 0-1 06/26/2018    RBCUA >20 02/21/2014    BACTERIA NONE 06/26/2018    CLARITYU Clear 06/26/2018    SPECGRAV 1.015 06/26/2018    LEUKOCYTESUR Negative 06/26/2018    UROBILINOGEN 0.2 06/26/2018    BILIRUBINUR Negative 06/26/2018    BLOODU SMALL 06/26/2018    GLUCOSEU Negative 06/26/2018    AMORPHOUS MANY 11/14/2015     HgBA1c:  No results found for: LABA1C  FLP:    Lab Results   Component Value Date    TRIG 96 06/27/2018    HDL 29 06/27/2018    LDLCALC 104 06/27/2018    LABVLDL 19 06/27/2018     TSH:    Lab Results   Component Value Date    TSH 1.680 06/27/2018     LIPASE:    Lab Results   Component Value Date    LIPASE 27 06/25/2018       No results for input(s): GLUMET in the last 72 hours. CTA CHEST W CONTRAST   Final Result   1. There is no evidence of a pulmonary embolus. .   2. Very minimal and vague patchy infiltrates within the right upper   lobe and left lower lobe posteriorly. These findings are favored to   represent early pneumonia. XR CHEST PORTABLE   Final Result   1. Unremarkable chest.           phytonadione (VITAMIN K)  IVPB  5 mg Intravenous Once    tamsulosin  0.8 mg Oral Daily    efavirenz-emtrictabine-tenofovir  1 tablet Oral Nightly    vitamin D  1,000 Units Oral Daily    famotidine  20 mg Oral BID    atorvastatin  40 mg Oral Nightly    cefTRIAXone (ROCEPHIN) IV  2 g Intravenous Q24H    guaiFENesin  600 mg Oral BID    budesonide  500 mcg Nebulization BID    hydrochlorothiazide  12.5 mg Oral Daily    levofloxacin  750 mg Intravenous Q24H    oseltamivir  75 mg Oral BID    ipratropium-albuterol  1 ampule Inhalation Q4H    Abacavir-Dolutegravir-Lamivud  1 tablet Oral Nightly        Assessment;   Active Problems:       Sepsis ([address]     Acute hypoxic respiratory failure     Acute parainfluenza 3 pulmonary infection     Community-acquired pneumonia     Acute kidney injury     History of prior tobacco use     HIV     Hx DVT     HLD    Plan:   IV Levaquin and Rocephin  O2 nasal cannula  DuoNeb and Pulmicort aerosol  Labs in a.m. Tamiflu 75 mg twice a day  See orders.         Kailey Hanson DO  3:11 PM  6/27/2018

## 2018-06-28 VITALS
HEART RATE: 82 BPM | WEIGHT: 267 LBS | SYSTOLIC BLOOD PRESSURE: 126 MMHG | OXYGEN SATURATION: 96 % | DIASTOLIC BLOOD PRESSURE: 82 MMHG | BODY MASS INDEX: 31.53 KG/M2 | HEIGHT: 77 IN | TEMPERATURE: 97.6 F | RESPIRATION RATE: 20 BRPM

## 2018-06-28 LAB
INR BLD: 1.4
PROTHROMBIN TIME: 16.4 SEC (ref 9.3–12.4)
URINE CULTURE, ROUTINE: NORMAL

## 2018-06-28 PROCEDURE — 6360000002 HC RX W HCPCS: Performed by: INTERNAL MEDICINE

## 2018-06-28 PROCEDURE — 86803 HEPATITIS C AB TEST: CPT

## 2018-06-28 PROCEDURE — 6370000000 HC RX 637 (ALT 250 FOR IP): Performed by: INTERNAL MEDICINE

## 2018-06-28 PROCEDURE — 87536 HIV-1 QUANT&REVRSE TRNSCRPJ: CPT

## 2018-06-28 PROCEDURE — 36415 COLL VENOUS BLD VENIPUNCTURE: CPT

## 2018-06-28 PROCEDURE — 2700000000 HC OXYGEN THERAPY PER DAY

## 2018-06-28 PROCEDURE — 86357 NK CELLS TOTAL COUNT: CPT

## 2018-06-28 PROCEDURE — 94640 AIRWAY INHALATION TREATMENT: CPT

## 2018-06-28 PROCEDURE — 86592 SYPHILIS TEST NON-TREP QUAL: CPT

## 2018-06-28 PROCEDURE — 86360 T CELL ABSOLUTE COUNT/RATIO: CPT

## 2018-06-28 PROCEDURE — 85610 PROTHROMBIN TIME: CPT

## 2018-06-28 PROCEDURE — 86359 T CELLS TOTAL COUNT: CPT

## 2018-06-28 RX ORDER — GUAIFENESIN/DEXTROMETHORPHAN 100-10MG/5
10 SYRUP ORAL EVERY 4 HOURS PRN
Status: DISCONTINUED | OUTPATIENT
Start: 2018-06-28 | End: 2018-06-28 | Stop reason: HOSPADM

## 2018-06-28 RX ORDER — CYCLOBENZAPRINE HCL 10 MG
10 TABLET ORAL 2 TIMES DAILY PRN
DISCHARGE
Start: 2018-06-28 | End: 2018-07-08

## 2018-06-28 RX ORDER — GUAIFENESIN/DEXTROMETHORPHAN 100-10MG/5
10 SYRUP ORAL EVERY 4 HOURS PRN
Qty: 120 ML | COMMUNITY
Start: 2018-06-28 | End: 2018-07-08

## 2018-06-28 RX ORDER — IPRATROPIUM BROMIDE AND ALBUTEROL SULFATE 2.5; .5 MG/3ML; MG/3ML
3 SOLUTION RESPIRATORY (INHALATION) EVERY 4 HOURS
Qty: 360 ML | Refills: 0
Start: 2018-06-28 | End: 2020-10-01 | Stop reason: ALTCHOICE

## 2018-06-28 RX ORDER — BUDESONIDE 0.5 MG/2ML
500 INHALANT ORAL 2 TIMES DAILY
Qty: 60 AMPULE | Refills: 3
Start: 2018-06-28 | End: 2019-03-19 | Stop reason: ALTCHOICE

## 2018-06-28 RX ORDER — IPRATROPIUM BROMIDE AND ALBUTEROL SULFATE 2.5; .5 MG/3ML; MG/3ML
3 SOLUTION RESPIRATORY (INHALATION) EVERY 4 HOURS PRN
Qty: 360 ML | Refills: 0 | Status: SHIPPED | OUTPATIENT
Start: 2018-06-28 | End: 2019-03-19 | Stop reason: ALTCHOICE

## 2018-06-28 RX ORDER — LEVOFLOXACIN 5 MG/ML
750 INJECTION, SOLUTION INTRAVENOUS EVERY 24 HOURS
Qty: 1000 ML | DISCHARGE
Start: 2018-06-28 | End: 2019-03-19 | Stop reason: ALTCHOICE

## 2018-06-28 RX ADMIN — TAMSULOSIN HYDROCHLORIDE 0.8 MG: 0.4 CAPSULE ORAL at 08:35

## 2018-06-28 RX ADMIN — VITAMIN D 1000 UNITS: 25 TAB ORAL at 08:35

## 2018-06-28 RX ADMIN — GUAIFENESIN 600 MG: 600 TABLET, EXTENDED RELEASE ORAL at 08:35

## 2018-06-28 RX ADMIN — BUDESONIDE 500 MCG: 0.5 SUSPENSION RESPIRATORY (INHALATION) at 06:20

## 2018-06-28 RX ADMIN — CALCIUM CARBONATE (ANTACID) CHEW TAB 500 MG 500 MG: 500 CHEW TAB at 02:38

## 2018-06-28 RX ADMIN — IPRATROPIUM BROMIDE AND ALBUTEROL SULFATE 1 AMPULE: .5; 3 SOLUTION RESPIRATORY (INHALATION) at 02:32

## 2018-06-28 RX ADMIN — POTASSIUM CHLORIDE AND SODIUM CHLORIDE: 900; 150 INJECTION, SOLUTION INTRAVENOUS at 02:35

## 2018-06-28 RX ADMIN — IPRATROPIUM BROMIDE AND ALBUTEROL SULFATE 1 AMPULE: .5; 3 SOLUTION RESPIRATORY (INHALATION) at 06:20

## 2018-06-28 RX ADMIN — IPRATROPIUM BROMIDE AND ALBUTEROL SULFATE 1 AMPULE: .5; 3 SOLUTION RESPIRATORY (INHALATION) at 09:22

## 2018-06-28 RX ADMIN — IPRATROPIUM BROMIDE AND ALBUTEROL SULFATE 1 AMPULE: .5; 3 SOLUTION RESPIRATORY (INHALATION) at 12:13

## 2018-06-28 RX ADMIN — HYDROCHLOROTHIAZIDE 12.5 MG: 12.5 TABLET ORAL at 08:39

## 2018-06-28 RX ADMIN — FAMOTIDINE 20 MG: 20 TABLET ORAL at 08:35

## 2018-06-28 ASSESSMENT — PAIN SCALES - GENERAL
PAINLEVEL_OUTOF10: 0
PAINLEVEL_OUTOF10: 0

## 2018-06-28 NOTE — DISCHARGE SUMMARY
16 and 1.4. Viral panel Disch was positive for parainfluenza 3 infection. The patient was very hypoxic on admission and needed 5 liters nasal cannula to keep the O2 sat greater than 92% at rest.  The patient was consult infectious disease who managed the respiratory pneumonia. The patient still needed 4 liters nasal cannula on discharge and was set up with home oxygen from the [place] along with more intensive respiratory medication.       Brief Physical Examination and Laboratory Data on Day of Discharge   Vitals:  /82   Pulse 82   Temp 97.6 °F (36.4 °C) (Oral)   Resp 20   Ht 6' 5\" (1.956 m)   Wt 267 lb (121.1 kg)   SpO2 96%   BMI 31.66 kg/m²   General Appearance:  awake, alert, and oriented to person, place, time, and purpose; appears stated age and cooperative; no apparent distress no labored breathing  HEENT:  NCAT; PERRL; conjunctiva pink, sclera clear  Neck:  no adenopathy, bruit, JVD, tenderness, masses, or nodules; supple, symmetrical, trachea midline, thyroid not enlarged  Lung:  Coarse decreased bs to auscultation bilaterally; no use of accessory muscles; no rhonchi, rales, or crackles  Heart:  regular rate and regular rhythm without murmur, rub, or gallop  Abdomen:  soft, nontender, + obese, nondistended; normoactive bowel sounds; no organomegaly  Extremities:  + stasis dermatitis bilateral LL, , no cyanosis or edema  Neurologic:  mental status A&Ox3, thought content appropriate; CN II-XII grossly intact; sensation intact, motor strength 5/5 globally; no slurred speech    Labs  Lab Results   Component Value Date    WBC 4.9 06/27/2018    HGB 13.4 06/27/2018    HCT 38.2 06/27/2018     06/27/2018     06/27/2018    K 3.9 06/27/2018     06/27/2018    CREATININE 1.4 (H) 06/27/2018    BUN 16 06/27/2018    CO2 21 (L) 06/27/2018    GLUCOSE 105 06/27/2018    ALT 17 06/27/2018    AST 31 06/27/2018    INR 1.4 06/28/2018     Lab Results   Component Value Date    INR 1.4 06/28/2018    INR

## 2018-06-28 NOTE — PROGRESS NOTES
[address] [place] [address] Progress Note    Hospital Day: Hospital Day: 4  PT Name: Harlan Calero  MRN: [id number]  Primary Care Physician: Virginie Castrejon MD  Requesting physician:   Marshall Hook DO    Reason for Admission:   Chief Complaint   Patient presents with    Cough    Shortness of Breath     Reason for consultation: hiv  Chief Complaint: cough       Assessment  Harlan Calero is a 57y.o. year old male who presented on 6/25/2018 and is being treated for    Chief Complaint   Patient presents with    Cough    Shortness of Breath     Pt with HIV/immunocompromised pt with ParaInfluenza 3 Virus  CKD  Plan    [facility]  A[place] labs    · Watch for diarrhea/cdiff. · Monitor labs  · Otherwise continue current therapy. · Please see orders for further management and care. Alicia Moralez was seen and examined at bedside today. All patient questions were answered and all tests were reviewed. NO family present during my examination. There are no adverse drug reactions noted including rash or itching.  Otherwise he states that there are no new complaints at this time  Breathing better    Hospital Medications    sodium chloride (OCEAN, BABY AYR) 0.65 % nasal spray 1 spray Q2H PRN   calcium carbonate (TUMS) chewable tablet 500 mg TID PRN   tamsulosin (FLOMAX) capsule 0.8 mg Daily   vitamin D (CHOLECALCIFEROL) tablet 1,000 Units Daily   cyclobenzaprine (FLEXERIL) tablet 10 mg BID PRN   famotidine (PEPCID) tablet 20 mg BID   dicyclomine (BENTYL) tablet 20 mg Q6H PRN   atorvastatin (LIPITOR) tablet 40 mg Nightly   acetaminophen (TYLENOL) tablet 500 mg Q6H PRN   ipratropium-albuterol (DUONEB) nebulizer solution 1 ampule Q4H PRN   guaiFENesin (MUCINEX) extended release tablet 600 mg BID   ondansetron (ZOFRAN) injection 4 mg Q6H PRN   magnesium hydroxide (MILK OF MAGNESIA) 400 MG/5ML suspension 30 mL Daily PRN   0.9% NaCl with KCl 20 mEq infusion Continuous   budesonide (PULMICORT) nebulizer suspension 500 mcg BID   hydrochlorothiazide (HYDRODIURIL) tablet 12.5 mg Daily   levofloxacin (LEVAQUIN) 750 MG/150ML infusion 750 mg Q24H   ipratropium-albuterol (DUONEB) nebulizer solution 1 ampule Q4H   Abacavir-Dolutegravir-Lamivud 600- MG TABS 600 mg Nightly       PRN Medications  sodium chloride, calcium carbonate, cyclobenzaprine, dicyclomine, acetaminophen, ipratropium-albuterol, ondansetron, magnesium hydroxide    Objective  Most Recent Recorded Vitals  Vitals:    06/28/18 0830   BP: 126/82   Pulse: 82   Resp: 18   Temp:    SpO2:      I/O last 3 completed shifts: In: 9677 [P.O.:420; I.V.:592]  Out: 300 [Urine:300]  No intake/output data recorded. Physical Exam:  General: AAO to person, place, time, and purpose, NAD, labored breathing  Eyes: Conjunctivae/corneas clear, Sclera non icteric. Skin: Color, texture, and turgor normal. No rashes or lesions. Lungs:dec to auscultation bilaterally. No retractions or use of accessory muscles. No vocal fremitus. No rhonchi, No crackle No rale. On  O2  Heart: Regular rate and regular rhythm, no murmur  Abdomen: Soft, non-tender; distedned, bowel sounds normal; no masses,  no organomegaly  Extremities: Atraumatic, no cyanosis,  edema  Neurologic: Cranial nerves 2-12 grossly intact, no slurred speech. Most Recent Labs  Lab Results   Component Value Date    WBC 4.9 06/27/2018    HGB 13.4 06/27/2018    HCT 38.2 06/27/2018     06/27/2018     06/27/2018    K 3.9 06/27/2018     06/27/2018    CREATININE 1.4 (H) 06/27/2018    BUN 16 06/27/2018    CO2 21 (L) 06/27/2018    GLUCOSE 105 06/27/2018    ALT 17 06/27/2018    AST 31 06/27/2018    INR 1.4 06/28/2018    TSH 1.680 06/27/2018         Xr Chest Portable    Result Date: 6/25/2018  Location:[facility] Exam: XR CHEST PORTABLE Comparison: 5/3/2016 History:  Shortness of breath Findings: A single frontal view of the chest shows the mediastinum, great vessels and heart to be unremarkable.   No acute Results   Component Value Date    BC 24 Hours- no growth 06/25/2018     CULTURE, RESPIRATORY   Organism   Date Value Ref Range Status   06/26/2018 FILM ARR ParaInfluenza 3 Virus Detected (A)  Final     CULTURE SURGICAL  Recent Labs      06/26/18   0220   ORG  FILM ARR ParaInfluenza 3 Virus Detected*     URINE CULTURE  Urine Culture, Routine   Date Value Ref Range Status   06/26/2018 Growth not present  Final     Organism   Date Value Ref Range Status   06/26/2018 FILM ARR ParaInfluenza 3 Virus Detected (A)  Final        Adia Allen MD  9:21 AM  6/28/2018

## 2018-06-28 NOTE — PLAN OF CARE
Problem: Falls - Risk of:  Goal: Will remain free from falls  Will remain free from falls   Outcome: Met This Shift      Problem: Airway Clearance - Ineffective:  Goal: Ability to maintain a clear airway will improve  Ability to maintain a clear airway will improve   Outcome: Ongoing

## 2018-06-28 NOTE — PROGRESS NOTES
Received call from [address]  , [place], she stated they received a fax from [place] transfer [place] to come and  the pt for transfer to [place], I made the pt aware .

## 2018-06-28 NOTE — PLAN OF CARE
Problem: Falls - Risk of:  Goal: Will remain free from falls  Will remain free from falls   Outcome: Met This Shift      Problem: Airway Clearance - Ineffective:  Goal: Ability to maintain a clear airway will improve  Ability to maintain a clear airway will improve   Outcome: Met This Shift

## 2018-06-29 LAB
% CD 3 POS. LYMPH.: 78 % (ref 62–87)
% CD19: 12 % (ref 6–23)
% CD4: 38 % (ref 32–64)
% CD8: 43 % (ref 15–46)
% NK (CD56/16): 9 % (ref 4–26)
ABSOLUTE CD 3: 910 CELLS/UL (ref 570–2400)
ABSOLUTE CD 4 HELPER: 439 CELLS/UL (ref 430–1800)
ABSOLUTE CD 8 (SUPP): 502 CELLS/UL (ref 210–1200)
CD19 ABSOLUTE: 145 CELLS/UL (ref 91–610)
CD4/CD8 RATIO: 0.88 RATIO (ref 0.8–3.9)
HEPATITIS C ANTIBODY INTERPRETATION: NORMAL
LYMPHOCYTE SUBSET PANEL 5 INFO: NORMAL
NATURAL KILLER CD16 AND CD56 ABSOLUTE: 109 CELLS/UL (ref 78–470)
RPR: NORMAL

## 2018-07-01 LAB
BLOOD CULTURE, ROUTINE: NORMAL
CULTURE, BLOOD 2: NORMAL

## 2018-07-03 LAB
DIRECT EXAM: NORMAL
SPECIMEN: NORMAL

## 2019-01-03 ENCOUNTER — HOSPITAL ENCOUNTER (EMERGENCY)
Age: 64
Discharge: HOME | End: 2019-01-03
Attending: EMERGENCY MEDICINE
Payer: MEDICARE

## 2019-01-03 ENCOUNTER — APPOINTMENT (OUTPATIENT)
Dept: GENERAL RADIOLOGY | Age: 64
End: 2019-01-03
Payer: MEDICARE

## 2019-01-03 VITALS
SYSTOLIC BLOOD PRESSURE: 112 MMHG | DIASTOLIC BLOOD PRESSURE: 82 MMHG | BODY MASS INDEX: 31.53 KG/M2 | HEIGHT: 77 IN | HEART RATE: 72 BPM | WEIGHT: 267 LBS | OXYGEN SATURATION: 97 % | TEMPERATURE: 97.7 F | RESPIRATION RATE: 18 BRPM

## 2019-01-03 DIAGNOSIS — R19.7 DIARRHEA, UNSPECIFIED TYPE: Primary | ICD-10-CM

## 2019-01-03 LAB
ALBUMIN SERPL-MCNC: 4.4 G/DL (ref 3.5–5.2)
ALP BLD-CCNC: 55 U/L (ref 40–129)
ALT SERPL-CCNC: 14 U/L (ref 0–40)
ANION GAP SERPL CALCULATED.3IONS-SCNC: 11 MMOL/L (ref 7–16)
AST SERPL-CCNC: 21 U/L (ref 0–39)
BASOPHILS ABSOLUTE: 0.04 E9/L (ref 0–0.2)
BASOPHILS RELATIVE PERCENT: 0.5 % (ref 0–2)
BILIRUB SERPL-MCNC: 0.7 MG/DL (ref 0–1.2)
BUN BLDV-MCNC: 20 MG/DL (ref 8–23)
CALCIUM SERPL-MCNC: 8.6 MG/DL (ref 8.6–10.2)
CHLORIDE BLD-SCNC: 105 MMOL/L (ref 98–107)
CO2: 23 MMOL/L (ref 22–29)
CREAT SERPL-MCNC: 1.6 MG/DL (ref 0.7–1.2)
EOSINOPHILS ABSOLUTE: 0.24 E9/L (ref 0.05–0.5)
EOSINOPHILS RELATIVE PERCENT: 3 % (ref 0–6)
GFR AFRICAN AMERICAN: 53
GFR NON-AFRICAN AMERICAN: 44 ML/MIN/1.73
GLUCOSE BLD-MCNC: 96 MG/DL (ref 74–99)
HCT VFR BLD CALC: 41 % (ref 37–54)
HEMOGLOBIN: 14.1 G/DL (ref 12.5–16.5)
IMMATURE GRANULOCYTES #: 0.02 E9/L
IMMATURE GRANULOCYTES %: 0.2 % (ref 0–5)
INR BLD: 4.7
LACTIC ACID: 0.7 MMOL/L (ref 0.5–2.2)
LIPASE: 21 U/L (ref 13–60)
LYMPHOCYTES ABSOLUTE: 2.06 E9/L (ref 1.5–4)
LYMPHOCYTES RELATIVE PERCENT: 25.7 % (ref 20–42)
MCH RBC QN AUTO: 33.6 PG (ref 26–35)
MCHC RBC AUTO-ENTMCNC: 34.4 % (ref 32–34.5)
MCV RBC AUTO: 97.6 FL (ref 80–99.9)
MONOCYTES ABSOLUTE: 0.46 E9/L (ref 0.1–0.95)
MONOCYTES RELATIVE PERCENT: 5.7 % (ref 2–12)
NEUTROPHILS ABSOLUTE: 5.2 E9/L (ref 1.8–7.3)
NEUTROPHILS RELATIVE PERCENT: 64.9 % (ref 43–80)
PDW BLD-RTO: 14.2 FL (ref 11.5–15)
PLATELET # BLD: 199 E9/L (ref 130–450)
PMV BLD AUTO: 8.9 FL (ref 7–12)
POTASSIUM SERPL-SCNC: 3.6 MMOL/L (ref 3.5–5)
PROTHROMBIN TIME: 52.4 SEC (ref 9.3–12.4)
RBC # BLD: 4.2 E12/L (ref 3.8–5.8)
SODIUM BLD-SCNC: 139 MMOL/L (ref 132–146)
TOTAL PROTEIN: 7.6 G/DL (ref 6.4–8.3)
WBC # BLD: 8 E9/L (ref 4.5–11.5)

## 2019-01-03 PROCEDURE — 2580000003 HC RX 258: Performed by: NURSE PRACTITIONER

## 2019-01-03 PROCEDURE — 36415 COLL VENOUS BLD VENIPUNCTURE: CPT

## 2019-01-03 PROCEDURE — 83605 ASSAY OF LACTIC ACID: CPT

## 2019-01-03 PROCEDURE — 83690 ASSAY OF LIPASE: CPT

## 2019-01-03 PROCEDURE — 99284 EMERGENCY DEPT VISIT MOD MDM: CPT

## 2019-01-03 PROCEDURE — 80053 COMPREHEN METABOLIC PANEL: CPT

## 2019-01-03 PROCEDURE — 71046 X-RAY EXAM CHEST 2 VIEWS: CPT

## 2019-01-03 PROCEDURE — 85610 PROTHROMBIN TIME: CPT

## 2019-01-03 PROCEDURE — 85025 COMPLETE CBC W/AUTO DIFF WBC: CPT

## 2019-01-03 RX ORDER — 0.9 % SODIUM CHLORIDE 0.9 %
1000 INTRAVENOUS SOLUTION INTRAVENOUS ONCE
Status: COMPLETED | OUTPATIENT
Start: 2019-01-03 | End: 2019-01-03

## 2019-01-03 RX ADMIN — SODIUM CHLORIDE 1000 ML: 900 INJECTION, SOLUTION INTRAVENOUS at 17:47

## 2019-01-03 ASSESSMENT — PAIN DESCRIPTION - ORIENTATION: ORIENTATION: LEFT

## 2019-01-03 ASSESSMENT — PAIN DESCRIPTION - DESCRIPTORS: DESCRIPTORS: ACHING

## 2019-01-03 ASSESSMENT — PAIN SCALES - GENERAL: PAINLEVEL_OUTOF10: 4

## 2019-01-03 ASSESSMENT — PAIN DESCRIPTION - LOCATION: LOCATION: TOE (COMMENT WHICH ONE)

## 2019-01-03 ASSESSMENT — PAIN DESCRIPTION - PAIN TYPE: TYPE: CHRONIC PAIN

## 2019-02-12 ENCOUNTER — HOSPITAL ENCOUNTER (EMERGENCY)
Age: 64
Discharge: HOME | End: 2019-02-13
Attending: EMERGENCY MEDICINE
Payer: MEDICARE

## 2019-02-12 ENCOUNTER — APPOINTMENT (OUTPATIENT)
Dept: GENERAL RADIOLOGY | Age: 64
End: 2019-02-12
Payer: MEDICARE

## 2019-02-12 DIAGNOSIS — R42 DIZZINESS: Primary | ICD-10-CM

## 2019-02-12 LAB
BASOPHILS ABSOLUTE: 0.03 E9/L (ref 0–0.2)
BASOPHILS RELATIVE PERCENT: 0.4 % (ref 0–2)
EOSINOPHILS ABSOLUTE: 0.21 E9/L (ref 0.05–0.5)
EOSINOPHILS RELATIVE PERCENT: 2.9 % (ref 0–6)
HCT VFR BLD CALC: 38.5 % (ref 37–54)
HEMOGLOBIN: 13.5 G/DL (ref 12.5–16.5)
IMMATURE GRANULOCYTES #: 0.03 E9/L
IMMATURE GRANULOCYTES %: 0.4 % (ref 0–5)
INFLUENZA A BY PCR: NOT DETECTED
INFLUENZA B BY PCR: NOT DETECTED
INR BLD: 2
LACTIC ACID: 0.9 MMOL/L (ref 0.5–2.2)
LYMPHOCYTES ABSOLUTE: 1.7 E9/L (ref 1.5–4)
LYMPHOCYTES RELATIVE PERCENT: 23.7 % (ref 20–42)
MCH RBC QN AUTO: 33.7 PG (ref 26–35)
MCHC RBC AUTO-ENTMCNC: 35.1 % (ref 32–34.5)
MCV RBC AUTO: 96 FL (ref 80–99.9)
MONOCYTES ABSOLUTE: 0.42 E9/L (ref 0.1–0.95)
MONOCYTES RELATIVE PERCENT: 5.9 % (ref 2–12)
NEUTROPHILS ABSOLUTE: 4.78 E9/L (ref 1.8–7.3)
NEUTROPHILS RELATIVE PERCENT: 66.7 % (ref 43–80)
PDW BLD-RTO: 13.9 FL (ref 11.5–15)
PLATELET # BLD: 190 E9/L (ref 130–450)
PMV BLD AUTO: 9 FL (ref 7–12)
PROTHROMBIN TIME: 22.7 SEC (ref 9.3–12.4)
RBC # BLD: 4.01 E12/L (ref 3.8–5.8)
WBC # BLD: 7.2 E9/L (ref 4.5–11.5)

## 2019-02-12 PROCEDURE — 71045 X-RAY EXAM CHEST 1 VIEW: CPT

## 2019-02-12 PROCEDURE — 93005 ELECTROCARDIOGRAM TRACING: CPT | Performed by: EMERGENCY MEDICINE

## 2019-02-12 PROCEDURE — 83690 ASSAY OF LIPASE: CPT

## 2019-02-12 PROCEDURE — 2580000003 HC RX 258: Performed by: EMERGENCY MEDICINE

## 2019-02-12 PROCEDURE — 87502 INFLUENZA DNA AMP PROBE: CPT

## 2019-02-12 PROCEDURE — 99284 EMERGENCY DEPT VISIT MOD MDM: CPT

## 2019-02-12 PROCEDURE — 85610 PROTHROMBIN TIME: CPT

## 2019-02-12 PROCEDURE — 84484 ASSAY OF TROPONIN QUANT: CPT

## 2019-02-12 PROCEDURE — 80053 COMPREHEN METABOLIC PANEL: CPT

## 2019-02-12 PROCEDURE — 87040 BLOOD CULTURE FOR BACTERIA: CPT

## 2019-02-12 PROCEDURE — 36415 COLL VENOUS BLD VENIPUNCTURE: CPT

## 2019-02-12 PROCEDURE — 83605 ASSAY OF LACTIC ACID: CPT

## 2019-02-12 PROCEDURE — 85025 COMPLETE CBC W/AUTO DIFF WBC: CPT

## 2019-02-12 RX ORDER — FINASTERIDE 5 MG/1
5 TABLET, FILM COATED ORAL DAILY
COMMUNITY
End: 2020-10-01 | Stop reason: ALTCHOICE

## 2019-02-12 RX ORDER — ALBUTEROL SULFATE 0.63 MG/3ML
1 SOLUTION RESPIRATORY (INHALATION) EVERY 6 HOURS PRN
COMMUNITY
End: 2019-03-19 | Stop reason: ALTCHOICE

## 2019-02-12 RX ORDER — 0.9 % SODIUM CHLORIDE 0.9 %
500 INTRAVENOUS SOLUTION INTRAVENOUS ONCE
Status: COMPLETED | OUTPATIENT
Start: 2019-02-12 | End: 2019-02-13

## 2019-02-12 RX ADMIN — SODIUM CHLORIDE 500 ML: 9 INJECTION, SOLUTION INTRAVENOUS at 23:35

## 2019-02-12 ASSESSMENT — ENCOUNTER SYMPTOMS
BACK PAIN: 0
SHORTNESS OF BREATH: 0
COUGH: 0
ABDOMINAL PAIN: 0
NAUSEA: 0

## 2019-02-13 VITALS
OXYGEN SATURATION: 94 % | RESPIRATION RATE: 16 BRPM | SYSTOLIC BLOOD PRESSURE: 136 MMHG | WEIGHT: 280 LBS | BODY MASS INDEX: 33.06 KG/M2 | TEMPERATURE: 99 F | DIASTOLIC BLOOD PRESSURE: 90 MMHG | HEIGHT: 77 IN | HEART RATE: 65 BPM

## 2019-02-13 LAB
ALBUMIN SERPL-MCNC: 3.9 G/DL (ref 3.5–5.2)
ALP BLD-CCNC: 52 U/L (ref 40–129)
ALT SERPL-CCNC: 21 U/L (ref 0–40)
ANION GAP SERPL CALCULATED.3IONS-SCNC: 11 MMOL/L (ref 7–16)
AST SERPL-CCNC: 26 U/L (ref 0–39)
BACTERIA: NORMAL /HPF
BILIRUB SERPL-MCNC: 0.7 MG/DL (ref 0–1.2)
BILIRUBIN URINE: NEGATIVE
BLOOD, URINE: ABNORMAL
BUN BLDV-MCNC: 20 MG/DL (ref 8–23)
CALCIUM SERPL-MCNC: 9.4 MG/DL (ref 8.6–10.2)
CHLORIDE BLD-SCNC: 104 MMOL/L (ref 98–107)
CLARITY: CLEAR
CO2: 23 MMOL/L (ref 22–29)
COLOR: YELLOW
CREAT SERPL-MCNC: 1.2 MG/DL (ref 0.7–1.2)
EKG ATRIAL RATE: 72 BPM
EKG P AXIS: 62 DEGREES
EKG P-R INTERVAL: 194 MS
EKG Q-T INTERVAL: 390 MS
EKG QRS DURATION: 84 MS
EKG QTC CALCULATION (BAZETT): 427 MS
EKG R AXIS: 20 DEGREES
EKG T AXIS: 51 DEGREES
EKG VENTRICULAR RATE: 72 BPM
EPITHELIAL CELLS, UA: NORMAL /HPF
GFR AFRICAN AMERICAN: >60
GFR NON-AFRICAN AMERICAN: >60 ML/MIN/1.73
GLUCOSE BLD-MCNC: 144 MG/DL (ref 74–99)
GLUCOSE URINE: NEGATIVE MG/DL
KETONES, URINE: NEGATIVE MG/DL
LEUKOCYTE ESTERASE, URINE: NEGATIVE
LIPASE: 37 U/L (ref 13–60)
NITRITE, URINE: NEGATIVE
PH UA: 6 (ref 5–9)
POTASSIUM SERPL-SCNC: 3.8 MMOL/L (ref 3.5–5)
PROTEIN UA: NEGATIVE MG/DL
RBC UA: NORMAL /HPF (ref 0–2)
SODIUM BLD-SCNC: 138 MMOL/L (ref 132–146)
SPECIFIC GRAVITY UA: 1.01 (ref 1–1.03)
TOTAL PROTEIN: 7.3 G/DL (ref 6.4–8.3)
TROPONIN: <0.01 NG/ML (ref 0–0.03)
UROBILINOGEN, URINE: 0.2 E.U./DL
WBC UA: NORMAL /HPF (ref 0–5)

## 2019-02-13 PROCEDURE — 81001 URINALYSIS AUTO W/SCOPE: CPT

## 2019-02-13 PROCEDURE — 6370000000 HC RX 637 (ALT 250 FOR IP): Performed by: EMERGENCY MEDICINE

## 2019-02-13 RX ORDER — MECLIZINE HCL 12.5 MG/1
25 TABLET ORAL ONCE
Status: COMPLETED | OUTPATIENT
Start: 2019-02-13 | End: 2019-02-13

## 2019-02-13 RX ADMIN — MECLIZINE 25 MG: 12.5 TABLET ORAL at 00:28

## 2019-02-13 NOTE — ED NOTES
Pt ambulated, states he felt dizziness but this improved though out walk , pulse 112, pulse ox 94% RA     John Carter RN  02/13/19 0021

## 2019-02-13 NOTE — ED NOTES
Bed: 04  Expected date:   Expected time:   Means of arrival:   Comments:  [address], RN  02/12/19 [id number]

## 2019-02-18 LAB
BLOOD CULTURE, ROUTINE: NORMAL
CULTURE, BLOOD 2: NORMAL

## 2019-03-19 RX ORDER — LISINOPRIL AND HYDROCHLOROTHIAZIDE 12.5; 1 MG/1; MG/1
1 TABLET ORAL DAILY
COMMUNITY
End: 2019-05-18

## 2019-03-20 ENCOUNTER — ANESTHESIA EVENT (OUTPATIENT)
Dept: OPERATING ROOM | Age: 64
End: 2019-03-20
Payer: MEDICARE

## 2019-03-21 ENCOUNTER — HOSPITAL ENCOUNTER (OUTPATIENT)
Age: 64
Discharge: HOME | End: 2019-03-21
Payer: MEDICARE

## 2019-03-21 LAB
APTT: 41.4 SEC (ref 24.5–35.1)
INR BLD: 1.1
PROTHROMBIN TIME: 12.6 SEC (ref 9.3–12.4)

## 2019-03-21 PROCEDURE — 36415 COLL VENOUS BLD VENIPUNCTURE: CPT

## 2019-03-21 PROCEDURE — 85730 THROMBOPLASTIN TIME PARTIAL: CPT

## 2019-03-21 PROCEDURE — 85610 PROTHROMBIN TIME: CPT

## 2019-03-22 ENCOUNTER — ANESTHESIA (OUTPATIENT)
Dept: OPERATING ROOM | Age: 64
End: 2019-03-22
Payer: MEDICARE

## 2019-03-22 ENCOUNTER — HOSPITAL ENCOUNTER (OUTPATIENT)
Age: 64
Setting detail: OUTPATIENT SURGERY
Discharge: HOME | End: 2019-03-22
Attending: PODIATRIST | Admitting: PODIATRIST
Payer: MEDICARE

## 2019-03-22 ENCOUNTER — HOSPITAL ENCOUNTER (EMERGENCY)
Age: 64
Discharge: HOME | End: 2019-03-22
Attending: EMERGENCY MEDICINE
Payer: MEDICARE

## 2019-03-22 VITALS
RESPIRATION RATE: 20 BRPM | TEMPERATURE: 98.6 F | OXYGEN SATURATION: 97 % | DIASTOLIC BLOOD PRESSURE: 77 MMHG | SYSTOLIC BLOOD PRESSURE: 128 MMHG

## 2019-03-22 VITALS
SYSTOLIC BLOOD PRESSURE: 132 MMHG | BODY MASS INDEX: 36.48 KG/M2 | HEART RATE: 72 BPM | WEIGHT: 309 LBS | DIASTOLIC BLOOD PRESSURE: 90 MMHG | HEIGHT: 77 IN | TEMPERATURE: 98.1 F | OXYGEN SATURATION: 94 % | RESPIRATION RATE: 20 BRPM

## 2019-03-22 VITALS
BODY MASS INDEX: 33.89 KG/M2 | OXYGEN SATURATION: 96 % | DIASTOLIC BLOOD PRESSURE: 87 MMHG | TEMPERATURE: 98.5 F | RESPIRATION RATE: 16 BRPM | SYSTOLIC BLOOD PRESSURE: 131 MMHG | WEIGHT: 287 LBS | HEART RATE: 80 BPM | HEIGHT: 77 IN

## 2019-03-22 DIAGNOSIS — R33.9 URINARY RETENTION: Primary | ICD-10-CM

## 2019-03-22 DIAGNOSIS — M20.42 HAMMERTOE OF LEFT FOOT: ICD-10-CM

## 2019-03-22 DIAGNOSIS — R58 BLEEDING: ICD-10-CM

## 2019-03-22 DIAGNOSIS — Z79.01 ANTICOAGULATED ON COUMADIN: Primary | ICD-10-CM

## 2019-03-22 LAB
ANION GAP SERPL CALCULATED.3IONS-SCNC: 11 MMOL/L (ref 7–16)
APTT: 36.3 SEC (ref 24.5–35.1)
BACTERIA: NORMAL /HPF
BASOPHILS ABSOLUTE: 0.05 E9/L (ref 0–0.2)
BASOPHILS RELATIVE PERCENT: 0.8 % (ref 0–2)
BILIRUBIN URINE: NEGATIVE
BLOOD, URINE: ABNORMAL
BUN BLDV-MCNC: 15 MG/DL (ref 8–23)
CALCIUM SERPL-MCNC: 9.3 MG/DL (ref 8.6–10.2)
CHLORIDE BLD-SCNC: 103 MMOL/L (ref 98–107)
CLARITY: CLEAR
CO2: 25 MMOL/L (ref 22–29)
COLOR: YELLOW
CREAT SERPL-MCNC: 1.4 MG/DL (ref 0.7–1.2)
EOSINOPHILS ABSOLUTE: 0.24 E9/L (ref 0.05–0.5)
EOSINOPHILS RELATIVE PERCENT: 3.9 % (ref 0–6)
EPITHELIAL CELLS, UA: NORMAL /HPF
GFR AFRICAN AMERICAN: >60
GFR NON-AFRICAN AMERICAN: 51 ML/MIN/1.73
GLUCOSE BLD-MCNC: 87 MG/DL (ref 74–99)
GLUCOSE URINE: NEGATIVE MG/DL
HCT VFR BLD CALC: 37 % (ref 37–54)
HEMOGLOBIN: 12.7 G/DL (ref 12.5–16.5)
IMMATURE GRANULOCYTES #: 0.02 E9/L
IMMATURE GRANULOCYTES %: 0.3 % (ref 0–5)
INR BLD: 1
KETONES, URINE: NEGATIVE MG/DL
LEUKOCYTE ESTERASE, URINE: NEGATIVE
LYMPHOCYTES ABSOLUTE: 1.55 E9/L (ref 1.5–4)
LYMPHOCYTES RELATIVE PERCENT: 25.1 % (ref 20–42)
MCH RBC QN AUTO: 34 PG (ref 26–35)
MCHC RBC AUTO-ENTMCNC: 34.3 % (ref 32–34.5)
MCV RBC AUTO: 99.2 FL (ref 80–99.9)
MONOCYTES ABSOLUTE: 0.5 E9/L (ref 0.1–0.95)
MONOCYTES RELATIVE PERCENT: 8.1 % (ref 2–12)
NEUTROPHILS ABSOLUTE: 3.81 E9/L (ref 1.8–7.3)
NEUTROPHILS RELATIVE PERCENT: 61.8 % (ref 43–80)
NITRITE, URINE: NEGATIVE
PDW BLD-RTO: 13.8 FL (ref 11.5–15)
PH UA: 5.5 (ref 5–9)
PLATELET # BLD: 178 E9/L (ref 130–450)
PMV BLD AUTO: 9.2 FL (ref 7–12)
POTASSIUM REFLEX MAGNESIUM: 4.6 MMOL/L (ref 3.5–5)
PROTEIN UA: NEGATIVE MG/DL
PROTHROMBIN TIME: 11.4 SEC (ref 9.3–12.4)
RBC # BLD: 3.73 E12/L (ref 3.8–5.8)
RBC UA: NORMAL /HPF (ref 0–2)
SODIUM BLD-SCNC: 139 MMOL/L (ref 132–146)
SPECIFIC GRAVITY UA: 1.01 (ref 1–1.03)
UROBILINOGEN, URINE: 0.2 E.U./DL
WBC # BLD: 6.2 E9/L (ref 4.5–11.5)
WBC UA: NORMAL /HPF (ref 0–5)

## 2019-03-22 PROCEDURE — 3700000000 HC ANESTHESIA ATTENDED CARE: Performed by: PODIATRIST

## 2019-03-22 PROCEDURE — 51702 INSERT TEMP BLADDER CATH: CPT

## 2019-03-22 PROCEDURE — 99283 EMERGENCY DEPT VISIT LOW MDM: CPT

## 2019-03-22 PROCEDURE — C1713 ANCHOR/SCREW BN/BN,TIS/BN: HCPCS | Performed by: PODIATRIST

## 2019-03-22 PROCEDURE — 36415 COLL VENOUS BLD VENIPUNCTURE: CPT

## 2019-03-22 PROCEDURE — 2580000003 HC RX 258: Performed by: ANESTHESIOLOGY

## 2019-03-22 PROCEDURE — 3600000012 HC SURGERY LEVEL 2 ADDTL 15MIN: Performed by: PODIATRIST

## 2019-03-22 PROCEDURE — 3700000001 HC ADD 15 MINUTES (ANESTHESIA): Performed by: PODIATRIST

## 2019-03-22 PROCEDURE — 7100000010 HC PHASE II RECOVERY - FIRST 15 MIN: Performed by: PODIATRIST

## 2019-03-22 PROCEDURE — 6360000002 HC RX W HCPCS: Performed by: EMERGENCY MEDICINE

## 2019-03-22 PROCEDURE — 6370000000 HC RX 637 (ALT 250 FOR IP): Performed by: ANESTHESIOLOGY

## 2019-03-22 PROCEDURE — 2709999900 HC NON-CHARGEABLE SUPPLY: Performed by: PODIATRIST

## 2019-03-22 PROCEDURE — 6360000002 HC RX W HCPCS: Performed by: PODIATRIST

## 2019-03-22 PROCEDURE — 6370000000 HC RX 637 (ALT 250 FOR IP)

## 2019-03-22 PROCEDURE — 96374 THER/PROPH/DIAG INJ IV PUSH: CPT

## 2019-03-22 PROCEDURE — 85025 COMPLETE CBC W/AUTO DIFF WBC: CPT

## 2019-03-22 PROCEDURE — 6360000002 HC RX W HCPCS: Performed by: NURSE ANESTHETIST, CERTIFIED REGISTERED

## 2019-03-22 PROCEDURE — 7100000011 HC PHASE II RECOVERY - ADDTL 15 MIN: Performed by: PODIATRIST

## 2019-03-22 PROCEDURE — 3600000002 HC SURGERY LEVEL 2 BASE: Performed by: PODIATRIST

## 2019-03-22 PROCEDURE — 96375 TX/PRO/DX INJ NEW DRUG ADDON: CPT

## 2019-03-22 PROCEDURE — 81001 URINALYSIS AUTO W/SCOPE: CPT

## 2019-03-22 PROCEDURE — 2580000003 HC RX 258: Performed by: PODIATRIST

## 2019-03-22 PROCEDURE — 87088 URINE BACTERIA CULTURE: CPT

## 2019-03-22 PROCEDURE — 80048 BASIC METABOLIC PNL TOTAL CA: CPT

## 2019-03-22 PROCEDURE — 85730 THROMBOPLASTIN TIME PARTIAL: CPT

## 2019-03-22 PROCEDURE — 85610 PROTHROMBIN TIME: CPT

## 2019-03-22 PROCEDURE — 2500000003 HC RX 250 WO HCPCS: Performed by: PODIATRIST

## 2019-03-22 DEVICE — IMPLANTABLE DEVICE: Type: IMPLANTABLE DEVICE | Site: TOES | Status: FUNCTIONAL

## 2019-03-22 RX ORDER — CEFDINIR 300 MG/1
300 CAPSULE ORAL 2 TIMES DAILY
Qty: 14 CAPSULE | Refills: 0 | Status: SHIPPED | OUTPATIENT
Start: 2019-03-22 | End: 2019-03-29

## 2019-03-22 RX ORDER — MORPHINE SULFATE 10 MG/ML
6 INJECTION, SOLUTION INTRAMUSCULAR; INTRAVENOUS ONCE
Status: COMPLETED | OUTPATIENT
Start: 2019-03-22 | End: 2019-03-22

## 2019-03-22 RX ORDER — PROPOFOL 10 MG/ML
INJECTION, EMULSION INTRAVENOUS PRN
Status: DISCONTINUED | OUTPATIENT
Start: 2019-03-22 | End: 2019-03-22 | Stop reason: SDUPTHER

## 2019-03-22 RX ORDER — PROPOFOL 10 MG/ML
INJECTION, EMULSION INTRAVENOUS CONTINUOUS PRN
Status: DISCONTINUED | OUTPATIENT
Start: 2019-03-22 | End: 2019-03-22 | Stop reason: SDUPTHER

## 2019-03-22 RX ORDER — ONDANSETRON 2 MG/ML
4 INJECTION INTRAMUSCULAR; INTRAVENOUS EVERY 6 HOURS PRN
Status: DISCONTINUED | OUTPATIENT
Start: 2019-03-22 | End: 2019-03-23 | Stop reason: HOSPADM

## 2019-03-22 RX ORDER — OXYCODONE HYDROCHLORIDE AND ACETAMINOPHEN 5; 325 MG/1; MG/1
1 TABLET ORAL
Status: COMPLETED | OUTPATIENT
Start: 2019-03-22 | End: 2019-03-22

## 2019-03-22 RX ORDER — FENTANYL CITRATE 50 UG/ML
INJECTION, SOLUTION INTRAMUSCULAR; INTRAVENOUS PRN
Status: DISCONTINUED | OUTPATIENT
Start: 2019-03-22 | End: 2019-03-22 | Stop reason: SDUPTHER

## 2019-03-22 RX ORDER — MIDAZOLAM HYDROCHLORIDE 1 MG/ML
INJECTION INTRAMUSCULAR; INTRAVENOUS PRN
Status: DISCONTINUED | OUTPATIENT
Start: 2019-03-22 | End: 2019-03-22 | Stop reason: SDUPTHER

## 2019-03-22 RX ORDER — SODIUM CHLORIDE, SODIUM LACTATE, POTASSIUM CHLORIDE, CALCIUM CHLORIDE 600; 310; 30; 20 MG/100ML; MG/100ML; MG/100ML; MG/100ML
INJECTION, SOLUTION INTRAVENOUS CONTINUOUS
Status: DISCONTINUED | OUTPATIENT
Start: 2019-03-22 | End: 2019-03-22 | Stop reason: HOSPADM

## 2019-03-22 RX ORDER — OXYCODONE HYDROCHLORIDE AND ACETAMINOPHEN 5; 325 MG/1; MG/1
1 TABLET ORAL EVERY 4 HOURS PRN
Qty: 28 TABLET | Refills: 0 | Status: SHIPPED | OUTPATIENT
Start: 2019-03-22 | End: 2019-03-29

## 2019-03-22 RX ADMIN — PROPOFOL 200 MCG/KG/MIN: 10 INJECTION, EMULSION INTRAVENOUS at 11:35

## 2019-03-22 RX ADMIN — OXYCODONE AND ACETAMINOPHEN 1 TABLET: 5; 325 TABLET ORAL at 13:19

## 2019-03-22 RX ADMIN — MIDAZOLAM 2 MG: 1 INJECTION INTRAMUSCULAR; INTRAVENOUS at 11:24

## 2019-03-22 RX ADMIN — PROPOFOL 40 MG: 10 INJECTION, EMULSION INTRAVENOUS at 11:37

## 2019-03-22 RX ADMIN — SODIUM CHLORIDE, POTASSIUM CHLORIDE, SODIUM LACTATE AND CALCIUM CHLORIDE: 600; 310; 30; 20 INJECTION, SOLUTION INTRAVENOUS at 11:24

## 2019-03-22 RX ADMIN — ONDANSETRON 4 MG: 2 INJECTION INTRAMUSCULAR; INTRAVENOUS at 19:27

## 2019-03-22 RX ADMIN — LIDOCAINE HYDROCHLORIDE 100 MG: 20 INJECTION, SOLUTION INTRAVENOUS at 11:35

## 2019-03-22 RX ADMIN — PROPOFOL 100 MG: 10 INJECTION, EMULSION INTRAVENOUS at 11:35

## 2019-03-22 RX ADMIN — FENTANYL CITRATE 50 MCG: 50 INJECTION, SOLUTION INTRAMUSCULAR; INTRAVENOUS at 11:35

## 2019-03-22 RX ADMIN — PROPOFOL 40 MG: 10 INJECTION, EMULSION INTRAVENOUS at 11:36

## 2019-03-22 RX ADMIN — CEFAZOLIN 3 G: 1 INJECTION, POWDER, FOR SOLUTION INTRAMUSCULAR; INTRAVENOUS at 11:24

## 2019-03-22 RX ADMIN — MORPHINE SULFATE 6 MG: 10 INJECTION INTRAVENOUS at 19:27

## 2019-03-22 RX ADMIN — FENTANYL CITRATE 50 MCG: 50 INJECTION, SOLUTION INTRAMUSCULAR; INTRAVENOUS at 11:38

## 2019-03-22 RX ADMIN — SODIUM CHLORIDE, POTASSIUM CHLORIDE, SODIUM LACTATE AND CALCIUM CHLORIDE: 600; 310; 30; 20 INJECTION, SOLUTION INTRAVENOUS at 10:00

## 2019-03-22 ASSESSMENT — PAIN DESCRIPTION - ORIENTATION
ORIENTATION: LEFT

## 2019-03-22 ASSESSMENT — LIFESTYLE VARIABLES: SMOKING_STATUS: 0

## 2019-03-22 ASSESSMENT — PAIN SCALES - GENERAL
PAINLEVEL_OUTOF10: 10
PAINLEVEL_OUTOF10: 3
PAINLEVEL_OUTOF10: 2
PAINLEVEL_OUTOF10: 4
PAINLEVEL_OUTOF10: 3

## 2019-03-22 ASSESSMENT — PAIN DESCRIPTION - DESCRIPTORS
DESCRIPTORS: SHARP
DESCRIPTORS: ACHING

## 2019-03-22 ASSESSMENT — PAIN DESCRIPTION - LOCATION
LOCATION: PERINEUM;ABDOMEN
LOCATION: FOOT

## 2019-03-22 ASSESSMENT — PAIN DESCRIPTION - PAIN TYPE
TYPE: SURGICAL PAIN
TYPE: ACUTE PAIN

## 2019-03-22 ASSESSMENT — PAIN - FUNCTIONAL ASSESSMENT: PAIN_FUNCTIONAL_ASSESSMENT: 0-10

## 2019-03-22 NOTE — H&P
Update History & Physical    The patient's History and Physical of 3 / 22 / 19 was reviewed with the patient and there were no significant changes. I examined the patient and there were no significant changes from the previous History and Physical.  Height 6' 5\" (1.956 m), weight 280 lb (127 kg). Plan: The risk, benefits, expected outcome, and alternative to the recommended procedure have been discussed with the patient. Patient understands and wants to proceed with the procedure.     Electronically signed by Jessica Davila DPM on 3/22/19 at 8:48 AM

## 2019-03-22 NOTE — ED NOTES
Bed: H2  Expected date:   Expected time:   Means of arrival:   Comments:     Manny Souza, FRANC  03/22/19 6436

## 2019-03-22 NOTE — ED PROVIDER NOTES
HPI:  3/22/19,   Time: 5:15 PM         Medardo Sahu is a 70 y.o. male presenting to the ED for left foot bleeding as well as urinary retention, beginning after discharge from operating room earlier this afternoon ago. The complaint has been persistent, moderate in severity, and worsened by nothing. Patient's 68-year-old male who just underwent podiatry surgery of the left 4th and 5th toes including pin placement, states that his wounds or bleeding, also states that he has not been able to urinate since being discharged from the postop recovery area and now he is in urinary retention. ROS:   Pertinent positives and negatives are stated within HPI, all other systems reviewed and are negative.  --------------------------------------------- PAST HISTORY ---------------------------------------------  Past Medical History:  has a past medical history of COPD (chronic obstructive pulmonary disease) ([facility] GERD (gastroesophageal reflux disease), HIV (human immunodeficiency virus infection) ([facility] Hyperlipidemia, Hypertension, and Prostate enlargement. Past Surgical History:  has a past surgical history that includes Abdomen surgery (1970); lymphadenectomy (Left); Colonoscopy; Foot surgery (Bilateral); Ankle surgery (Right); hernia repair; Heel spur surgery; and other surgical history (Left, 03/22/2019). Social History:  reports that he has quit smoking. His smoking use included cigarettes. He quit after 40.00 years of use. He has never used smokeless tobacco. He reports that he does not drink alcohol or use drugs. Family History: family history is not on file. The patients home medications have been reviewed.     Allergies: Ultram [tramadol hcl]    -------------------------------------------------- RESULTS -------------------------------------------------  All laboratory and radiology results have been personally reviewed by myself LABS:  Results for orders placed or performed during the hospital encounter of 03/22/19   CBC Auto Differential   Result Value Ref Range    WBC 6.2 4.5 - 11.5 E9/L    RBC 3.73 (L) 3.80 - 5.80 E12/L    Hemoglobin 12.7 12.5 - 16.5 g/dL    Hematocrit 37.0 37.0 - 54.0 %    MCV 99.2 80.0 - 99.9 fL    MCH 34.0 26.0 - 35.0 pg    MCHC 34.3 32.0 - 34.5 %    RDW 13.8 11.5 - 15.0 fL    Platelets 527 575 - 396 E9/L    MPV 9.2 7.0 - 12.0 fL    Neutrophils % 61.8 43.0 - 80.0 %    Immature Granulocytes % 0.3 0.0 - 5.0 %    Lymphocytes % 25.1 20.0 - 42.0 %    Monocytes % 8.1 2.0 - 12.0 %    Eosinophils % 3.9 0.0 - 6.0 %    Basophils % 0.8 0.0 - 2.0 %    Neutrophils # 3.81 1.80 - 7.30 E9/L    Immature Granulocytes # 0.02 E9/L    Lymphocytes # 1.55 1.50 - 4.00 E9/L    Monocytes # 0.50 0.10 - 0.95 E9/L    Eosinophils # 0.24 0.05 - 0.50 E9/L    Basophils # 0.05 0.00 - 0.20 M5/J   Basic Metabolic Panel w/ Reflex to MG   Result Value Ref Range    Sodium 139 132 - 146 mmol/L    Potassium reflex Magnesium 4.6 3.5 - 5.0 mmol/L    Chloride 103 98 - 107 mmol/L    CO2 25 22 - 29 mmol/L    Anion Gap 11 7 - 16 mmol/L    Glucose 87 74 - 99 mg/dL    BUN 15 8 - 23 mg/dL    CREATININE 1.4 (H) 0.7 - 1.2 mg/dL    GFR Non-African American 51 >=60 mL/min/1.73    GFR African American >60     Calcium 9.3 8.6 - 10.2 mg/dL   Protime-INR   Result Value Ref Range    Protime 11.4 9.3 - 12.4 sec    INR 1.0    APTT   Result Value Ref Range    aPTT 36.3 (H) 24.5 - 35.1 sec   Urinalysis, reflex to microscopic   Result Value Ref Range    Color, UA Yellow Straw/Yellow    Clarity, UA Clear Clear    Glucose, Ur Negative Negative mg/dL    Bilirubin Urine Negative Negative    Ketones, Urine Negative Negative mg/dL    Specific Gravity, UA 1.010 1.005 - 1.030    Blood, Urine SMALL (A) Negative    pH, UA 5.5 5.0 - 9.0    Protein, UA Negative Negative mg/dL    Urobilinogen, Urine 0.2 <2.0 E.U./dL    Nitrite, Urine Negative Negative    Leukocyte Esterase, Urine Negative Negative   Microscopic Urinalysis   Result Value Ref Range    WBC, UA NONE 0 - 5 /HPF    RBC, UA 1-3 0 - 2 /HPF    Epi Cells RARE /HPF    Bacteria, UA NONE /HPF       RADIOLOGY:  Interpreted by Radiologist.  No orders to display       ------------------------- NURSING NOTES AND VITALS REVIEWED ---------------------------   The nursing notes within the ED encounter and vital signs as below have been reviewed. BP (!) 132/90   Pulse 72   Temp 98.1 °F (36.7 °C) (Oral)   Resp 20   Ht 6' 5\" (1.956 m)   Wt (!) 309 lb (140.2 kg)   SpO2 94%   BMI 36.64 kg/m²   Oxygen Saturation Interpretation: Normal      ---------------------------------------------------PHYSICAL EXAM--------------------------------------      Constitutional/General: Alert and oriented x3, well appearing, non toxic in moderate discomfort from urinary retention  Head: NC/AT  Eyes: PERRL, EOMI  Mouth: Oropharynx clear, handling secretions, no trismus  Neck: Supple, full ROM, no meningeal signs  Pulmonary: Lungs clear to auscultation bilaterally, no wheezes, rales, or rhonchi. Not in respiratory distress  Cardiovascular:  Regular rate and rhythm, no murmurs, gallops, or rubs. 2+ distal pulses  Abdomen: Soft, non tender, non distended,   Extremities: Moves all extremities x 4.  Warm and well perfused, left foot noted to be mildly bleeding from incisions, sutures are intact  Skin: warm and dry without rash  Neurologic: GCS 15,  Psych: Normal Affect      ------------------------------ ED COURSE/MEDICAL DECISION MAKING----------------------  Medications   morphine injection 6 mg (6 mg Intravenous Given 3/22/19 1927)         Medical Decision Making:    Check labs  Harrington immediately placed with good relief and output of 1100 mL of clear yellow urine  Surgicel placed on patient's left foot with pressure dressing  Patient was monitored emergency department, no further bleeding was noted, new postop shoe was given the patient as well as leg bag and he is to follow-up with urology for Harrington catheter removal and podiatry for wound check    Counseling: The emergency provider has spoken with the patient and discussed todays results, in addition to providing specific details for the plan of care and counseling regarding the diagnosis and prognosis. Questions are answered at this time and they are agreeable with the plan.      --------------------------------- IMPRESSION AND DISPOSITION ---------------------------------    IMPRESSION  1. Urinary retention    2.  Bleeding        DISPOSITION  Disposition: Discharge to home  Patient condition is good                  Jason Ruelas MD  03/23/19 0391

## 2019-03-22 NOTE — ED NOTES
Dressings on left foot changed with hemostasis dressing and light gauze wrap by Dr Miguel Solis RN  03/22/19 301 Aashish Reyes RN  03/22/19 1950

## 2019-03-22 NOTE — BRIEF OP NOTE
Brief Postoperative Note  ______________________________________________________________    Patient: Luly Read  YOB: 1955  MRN: [id number]  Date of Procedure: 3/22/2019    Pre-Op Diagnosis: HAMMER TOES-LEFT FOOT    Post-Op Diagnosis: Same       Procedure(s):  HAMMERTOE CORRECTION 4TH AND 5TH TOES LEFT FOOT. V TO Y SKIN PLASTY 5TH MPJ LEFT FOOT    Anesthesia: Monitor Anesthesia Care    Surgeon(s):  Roseline Hernandez DPM    Assistant: resident Enzo Lopez    Estimated Blood Loss (mL): less than 50     Complications: None    Specimens:   * No specimens in log *    Implants:  Implant Name Type Inv. Item Serial No.  Lot No. LRB No. Used   IMPL KWIRE FIXATION . 045IN 6IN  SS Screw/Plate/Nail/Pineda IMPL KWIRE FIXATION . 045IN 6IN  SS  [employer]t 85  Left 1         Drains: * No LDAs found *    Findings: consistent with Dx    Roseline Noriega DPM  Date: 3/22/2019  Time: 12:19 PM

## 2019-03-24 NOTE — OP NOTE
[address] [place] [address]                                OPERATIVE REPORT    PATIENT NAME: Clem Sauer                  :        1955  MED REC NO:   [id number]                            ROOM:  ACCOUNT NO:   [de-identified]                           ADMIT DATE: 2019  PROVIDER:     Brooke Rubin DPM    DATE OF PROCEDURE:  2019    PREOPERATIVE DIAGNOSES:  1. Hammertoe deformity fourth and fifth toe of left foot. 2.  Tendon contracture fifth metatarsophalangeal joint, left foot. POSTOPERATIVE DIAGNOSES:  1. Hammertoe deformity fourth and fifth toe of left foot. 2.  Tendon contracture fifth metatarsophalangeal joint, left foot. PROCEDURES PERFORMED:  1. Chondylectomy distal interphalangeal joint fourth toe, left foot. 2.  Chondylectomy lateral aspect proximal interphalangeal joint, fifth  toe, left foot. 3.  Open V-Y skin plasty with tenotomy and dorsal capsulotomy and K-wire  fixation, fifth metatarsophalangeal joint, left foot. SURGEON:  Brooke Rubin DPM    ASSISTANT:  Podiatry resident, Tom. ANESTHESIA:  Local with IV sedation. INDICATIONS:  The patient presented to the operating room and placed on  the operating table in a supine position. The patient underwent IV  sedation that was administered by Department of Anesthesiology of the  [facility]. Once the patient was sedated, the patient's  operative foot was localized by the surgeon comprising the equal mixture  of 0.5% Marcaine plain along with 2% lidocaine plain totaling 10 mL,  after which time, the patient's foot was prepared, scrubbed and draped  in a sterile fashion. No tourniquet or epinephrine utilized. OPERATIVE PROCEDURE:  Attention was directed to the lateral aspect of  the left foot or a tourniquet _____ utilized due to his HIV positive  status.   Once the tourniquet was applied and elevated and remained  inflated for the procedure approximately 30 minutes. Attention was then  directed to the fourth toe left foot where a curvilinear incision  centered over the distal interphalangeal joint was made with #15 blade. The incision was deepened with sharp dissection. Once we got down the  condyle, the condyle was removed with reciprocating bone rasp. Once  removed to the satisfaction of the surgeon, the surgical site was  flushed with antibiotic solution. The skin and deep structures were  then reapproximated with 4-0 nylon. Attention directed to the lateral aspect of the fifth toe where a linear  incision was made with #15 blade. This incision was deepened with sharp  dissection. At this point, the hypertrophic condyle from previous  surgery was visualized. At this point, use of a reciprocating bone rasp  was then used to remove the hypertrophic condyle. Once completed, the  surgical site was flushed with antibiotic solution. The deep structures  were reapproximated with 4-0 Monocryl and the skin was reapproximated  with 4-0 nylon. At this point, the toe was totally contracted. Incision was made at the dorsal aspect of the fifth metatarsophalangeal  joint, a deep incision was then made, incision was deepened with sharp  dissection exposing the extensor tendon and dorsal aspect of the  capsule. A transverse incision into the tendon both the longus and  brevis were performed with a #15-blade. Once this was incised, a dorsal  capsulotomy was then performed. At this point, the toe was able to be  plantar flexed. The K-wire was then utilized to help keep the position  of the toe. Once it was in fixed position, the surgical site was  flushed with antibiotic solution, deep structures excluding tendon and  capsule were then reapproximated with 3-0 Monocryl and the skin was  reapproximated in the elongated position with 4-0 nylon.   At this time,  the patient's foot was cleansed and dry sterile

## 2019-03-25 LAB — URINE CULTURE, ROUTINE: NORMAL

## 2019-05-05 ENCOUNTER — HOSPITAL ENCOUNTER (EMERGENCY)
Age: 64
Discharge: HOME | End: 2019-05-05
Payer: MEDICARE

## 2019-05-05 VITALS
BODY MASS INDEX: 33.2 KG/M2 | RESPIRATION RATE: 20 BRPM | TEMPERATURE: 97.8 F | OXYGEN SATURATION: 98 % | WEIGHT: 280 LBS | DIASTOLIC BLOOD PRESSURE: 87 MMHG | SYSTOLIC BLOOD PRESSURE: 127 MMHG | HEART RATE: 78 BPM

## 2019-05-05 DIAGNOSIS — L50.9 URTICARIA: Primary | ICD-10-CM

## 2019-05-05 PROCEDURE — 99212 OFFICE O/P EST SF 10 MIN: CPT

## 2019-05-05 RX ORDER — PREDNISONE 10 MG/1
40 TABLET ORAL DAILY
Qty: 20 TABLET | Refills: 0 | Status: SHIPPED | OUTPATIENT
Start: 2019-05-05 | End: 2019-05-10

## 2019-05-05 RX ORDER — HYDROXYZINE PAMOATE 50 MG/1
50 CAPSULE ORAL 3 TIMES DAILY PRN
Qty: 21 CAPSULE | Refills: 0 | Status: SHIPPED | OUTPATIENT
Start: 2019-05-05 | End: 2019-05-12

## 2019-05-05 NOTE — ED PROVIDER NOTES
History:  reports that he has quit smoking. His smoking use included cigarettes. He quit after 40.00 years of use. He has never used smokeless tobacco. He reports that he does not drink alcohol or use drugs. Family History: family history is not on file. Allergies: Ultram [tramadol hcl]    Physical Exam   Oxygen Saturation Interpretation: Normal.   ED Triage Vitals [05/05/19 1430]   BP Temp Temp Source Pulse Resp SpO2 Height Weight   127/87 97.8 °F (36.6 °C) Oral 78 20 98 % -- 280 lb (127 kg)       Physical Exam  General: Vitals noted, no distress. Afebrile. Normal phonation. No stridor. No trismus. No angioedema. No anaphylaxis. EENT: Posterior oropharynx unremarkable. No angioedema. Airway widely patent. No mucous membrane lesions. Cardiac: Regular, rate, rhythm, no murmur. Pulmonary: Lungs clear bilaterally with good aeration. No adventitious breath sounds. Abdomen: Soft, nonsurgical. Nontender. No peritoneal signs. Normoactive bowel sounds. Extremities: No peripheral edema. Negative Homans bilaterally, no cords. Neurovascularly intact throughout. Skin: There is a scattered urticarial rash over the anterior chest, abdomen, and bilateral upper extremities. There are a few scattered lesions on the flanks and posterior thorax. Is not consistent with scabies. No evidence cellulitis or abscess formation. Not consistent with erythema multiforme. No mucous membrane lesions. Neuro: No gross neurologic deficits. Lab / Imaging Results   (All laboratory and radiology results have been personally reviewed by myself)  Labs:  No results found for this visit on 05/05/19. Imaging: All Radiology results interpreted by Radiologist unless otherwise noted.   No orders to display       ED Course / Medical Decision Making   Medications - No data to display       Consult(s):   None    Procedure(s):   None    Differential Diagnosis: Is extensive but includes localized allergic reaction, anaphylaxis, angioedema, bronchospasm, cardiovascular collapse, etc.    MDM:   This is a 70 y.o. male with a history of HIV positivity compliant with his antiretroviral medications who presents with a scattered urticarial rash of 3 days' duration after starting a new body wash. On exam, he has a scattered urticarial rash. He was initially concerned for zoster however the lesions are not tender and they are in a widespread nondermatomal distribution. Also, not consistent with scabies, erythema multiforme, cellulitis, etc. No evidence of anaphylaxis or angioedema. Will be home-going with Vistaril and a burst of prednisone. Urged to discontinue the new body wash. Counseling: I discussed the differential, results and discharge plan with the patient and/or family/friend/caregiver if present. I emphasized the importance of follow-up with the physician I referred them to in the timeframe recommended. I explained reasons for the patient to return to the Emergency Department. Additional verbal discharge instructions were also given and discussed with the patient to supplement those generated by the EMR. We also discussed medications that were prescribed (if any) including common side effects and interactions. The patient was advised to abstain from driving, operating heavy machinery or making significant decisions while taking medications such as opiates and muscle relaxers that may impair this. All questions were addressed. They understand return precautions and discharge instructions. The patient and/or family/friend/caregiver expressed understanding. Assessment      1.  Urticaria      Plan   Discharge to home and advised to contact MD Leandro Landa [phone]      As needed   Patient condition is good    New Medications     New Prescriptions    HYDROXYZINE (VISTARIL) 50 MG CAPSULE    Take 1 capsule by mouth 3 times daily as needed for Itching    PREDNISONE (DELTASONE) 10 MG TABLET    Take 4 tablets by mouth daily for 5 days     Electronically signed by BJORN Gallegos   DD: 5/5/19  **This report was transcribed using voice recognition software. Every effort was made to ensure accuracy; however, inadvertent computerized transcription errors may be present.   END OF ED PROVIDER NOTE            Libby Fearing [address] [place], [place]  05/05/19 9231

## 2019-05-17 ENCOUNTER — HOSPITAL ENCOUNTER (INPATIENT)
Age: 64
LOS: 2 days | Discharge: HOME | DRG: 312 | End: 2019-05-19
Attending: EMERGENCY MEDICINE | Admitting: FAMILY MEDICINE
Payer: MEDICARE

## 2019-05-17 ENCOUNTER — APPOINTMENT (OUTPATIENT)
Dept: GENERAL RADIOLOGY | Age: 64
DRG: 312 | End: 2019-05-17
Payer: MEDICARE

## 2019-05-17 ENCOUNTER — APPOINTMENT (OUTPATIENT)
Dept: CT IMAGING | Age: 64
DRG: 312 | End: 2019-05-17
Payer: MEDICARE

## 2019-05-17 DIAGNOSIS — R55 SYNCOPE AND COLLAPSE: Primary | ICD-10-CM

## 2019-05-17 DIAGNOSIS — E23.6 PITUITARY MASS (HCC): ICD-10-CM

## 2019-05-17 LAB
ALBUMIN SERPL-MCNC: 4.2 G/DL (ref 3.5–5.2)
ALP BLD-CCNC: 49 U/L (ref 40–129)
ALT SERPL-CCNC: 18 U/L (ref 0–40)
ANION GAP SERPL CALCULATED.3IONS-SCNC: 9 MMOL/L (ref 7–16)
AST SERPL-CCNC: 19 U/L (ref 0–39)
BACTERIA: NORMAL /HPF
BILIRUB SERPL-MCNC: 0.6 MG/DL (ref 0–1.2)
BILIRUBIN URINE: NEGATIVE
BLOOD, URINE: NORMAL
BUN BLDV-MCNC: 18 MG/DL (ref 8–23)
CALCIUM SERPL-MCNC: 9.7 MG/DL (ref 8.6–10.2)
CHLORIDE BLD-SCNC: 107 MMOL/L (ref 98–107)
CLARITY: CLEAR
CO2: 25 MMOL/L (ref 22–29)
COLOR: YELLOW
CREAT SERPL-MCNC: 1.3 MG/DL (ref 0.7–1.2)
EPITHELIAL CELLS, UA: NORMAL /HPF
GFR AFRICAN AMERICAN: >60
GFR NON-AFRICAN AMERICAN: 55 ML/MIN/1.73
GLUCOSE BLD-MCNC: 109 MG/DL (ref 74–99)
GLUCOSE URINE: NEGATIVE MG/DL
HCT VFR BLD CALC: 37.4 % (ref 37–54)
HEMOGLOBIN: 13 G/DL (ref 12.5–16.5)
INR BLD: 2.7
KETONES, URINE: NEGATIVE MG/DL
LEUKOCYTE ESTERASE, URINE: NEGATIVE
MCH RBC QN AUTO: 34.2 PG (ref 26–35)
MCHC RBC AUTO-ENTMCNC: 34.8 % (ref 32–34.5)
MCV RBC AUTO: 98.4 FL (ref 80–99.9)
NITRITE, URINE: NEGATIVE
PDW BLD-RTO: 13.8 FL (ref 11.5–15)
PH UA: 6 (ref 5–9)
PLATELET # BLD: 189 E9/L (ref 130–450)
PMV BLD AUTO: 9.1 FL (ref 7–12)
POTASSIUM SERPL-SCNC: 4.1 MMOL/L (ref 3.5–5)
PROTEIN UA: NEGATIVE MG/DL
PROTHROMBIN TIME: 29.9 SEC (ref 9.3–12.4)
RBC # BLD: 3.8 E12/L (ref 3.8–5.8)
RBC UA: NORMAL /HPF (ref 0–2)
SODIUM BLD-SCNC: 141 MMOL/L (ref 132–146)
SPECIFIC GRAVITY UA: <=1.005 (ref 1–1.03)
TOTAL PROTEIN: 7.5 G/DL (ref 6.4–8.3)
TROPONIN: <0.01 NG/ML (ref 0–0.03)
UROBILINOGEN, URINE: 0.2 E.U./DL
WBC # BLD: 8.4 E9/L (ref 4.5–11.5)
WBC UA: NORMAL /HPF (ref 0–5)

## 2019-05-17 PROCEDURE — G0378 HOSPITAL OBSERVATION PER HR: HCPCS

## 2019-05-17 PROCEDURE — 71045 X-RAY EXAM CHEST 1 VIEW: CPT

## 2019-05-17 PROCEDURE — 36415 COLL VENOUS BLD VENIPUNCTURE: CPT

## 2019-05-17 PROCEDURE — 70450 CT HEAD/BRAIN W/O DYE: CPT

## 2019-05-17 PROCEDURE — 93005 ELECTROCARDIOGRAM TRACING: CPT | Performed by: EMERGENCY MEDICINE

## 2019-05-17 PROCEDURE — 84484 ASSAY OF TROPONIN QUANT: CPT

## 2019-05-17 PROCEDURE — 85027 COMPLETE CBC AUTOMATED: CPT

## 2019-05-17 PROCEDURE — 99285 EMERGENCY DEPT VISIT HI MDM: CPT

## 2019-05-17 PROCEDURE — 6370000000 HC RX 637 (ALT 250 FOR IP): Performed by: EMERGENCY MEDICINE

## 2019-05-17 PROCEDURE — 81001 URINALYSIS AUTO W/SCOPE: CPT

## 2019-05-17 PROCEDURE — 85610 PROTHROMBIN TIME: CPT

## 2019-05-17 PROCEDURE — 1200000000 HC SEMI PRIVATE

## 2019-05-17 PROCEDURE — 80053 COMPREHEN METABOLIC PANEL: CPT

## 2019-05-17 RX ORDER — MECLIZINE HCL 12.5 MG/1
25 TABLET ORAL ONCE
Status: COMPLETED | OUTPATIENT
Start: 2019-05-17 | End: 2019-05-17

## 2019-05-17 RX ADMIN — MECLIZINE 25 MG: 12.5 TABLET ORAL at 22:27

## 2019-05-18 ENCOUNTER — APPOINTMENT (OUTPATIENT)
Dept: ULTRASOUND IMAGING | Age: 64
DRG: 312 | End: 2019-05-18
Payer: MEDICARE

## 2019-05-18 ENCOUNTER — APPOINTMENT (OUTPATIENT)
Dept: MRI IMAGING | Age: 64
DRG: 312 | End: 2019-05-18
Payer: MEDICARE

## 2019-05-18 PROBLEM — N18.2 CKD (CHRONIC KIDNEY DISEASE) STAGE 2, GFR 60-89 ML/MIN: Chronic | Status: ACTIVE | Noted: 2019-05-18

## 2019-05-18 PROBLEM — R55 SYNCOPE AND COLLAPSE: Status: ACTIVE | Noted: 2019-05-18

## 2019-05-18 PROBLEM — J44.9 COPD WITHOUT EXACERBATION (HCC): Chronic | Status: ACTIVE | Noted: 2019-05-18

## 2019-05-18 LAB
ANION GAP SERPL CALCULATED.3IONS-SCNC: 8 MMOL/L (ref 7–16)
BASOPHILS ABSOLUTE: 0.04 E9/L (ref 0–0.2)
BASOPHILS RELATIVE PERCENT: 0.6 % (ref 0–2)
BUN BLDV-MCNC: 15 MG/DL (ref 8–23)
CALCIUM SERPL-MCNC: 9.2 MG/DL (ref 8.6–10.2)
CHLORIDE BLD-SCNC: 106 MMOL/L (ref 98–107)
CO2: 28 MMOL/L (ref 22–29)
CREAT SERPL-MCNC: 1.3 MG/DL (ref 0.7–1.2)
EKG ATRIAL RATE: 73 BPM
EKG P AXIS: 52 DEGREES
EKG P-R INTERVAL: 174 MS
EKG Q-T INTERVAL: 390 MS
EKG QRS DURATION: 90 MS
EKG QTC CALCULATION (BAZETT): 429 MS
EKG R AXIS: 28 DEGREES
EKG T AXIS: 13 DEGREES
EKG VENTRICULAR RATE: 73 BPM
EOSINOPHILS ABSOLUTE: 0.28 E9/L (ref 0.05–0.5)
EOSINOPHILS RELATIVE PERCENT: 4.3 % (ref 0–6)
GFR AFRICAN AMERICAN: >60
GFR NON-AFRICAN AMERICAN: 55 ML/MIN/1.73
GLUCOSE BLD-MCNC: 153 MG/DL (ref 74–99)
HCT VFR BLD CALC: 37.9 % (ref 37–54)
HEMOGLOBIN: 12.8 G/DL (ref 12.5–16.5)
IMMATURE GRANULOCYTES #: 0.02 E9/L
IMMATURE GRANULOCYTES %: 0.3 % (ref 0–5)
INR BLD: 2.4
LYMPHOCYTES ABSOLUTE: 1.27 E9/L (ref 1.5–4)
LYMPHOCYTES RELATIVE PERCENT: 19.5 % (ref 20–42)
MCH RBC QN AUTO: 34.2 PG (ref 26–35)
MCHC RBC AUTO-ENTMCNC: 33.8 % (ref 32–34.5)
MCV RBC AUTO: 101.3 FL (ref 80–99.9)
MONOCYTES ABSOLUTE: 0.36 E9/L (ref 0.1–0.95)
MONOCYTES RELATIVE PERCENT: 5.5 % (ref 2–12)
NEUTROPHILS ABSOLUTE: 4.53 E9/L (ref 1.8–7.3)
NEUTROPHILS RELATIVE PERCENT: 69.8 % (ref 43–80)
PDW BLD-RTO: 14 FL (ref 11.5–15)
PLATELET # BLD: 171 E9/L (ref 130–450)
PMV BLD AUTO: 9.1 FL (ref 7–12)
POTASSIUM REFLEX MAGNESIUM: 3.9 MMOL/L (ref 3.5–5)
PROLACTIN: 41.75 NG/ML
PROTHROMBIN TIME: 27.2 SEC (ref 9.3–12.4)
RBC # BLD: 3.74 E12/L (ref 3.8–5.8)
SODIUM BLD-SCNC: 142 MMOL/L (ref 132–146)
T4 FREE: 0.8 NG/DL (ref 0.93–1.7)
TSH SERPL DL<=0.05 MIU/L-ACNC: 0.47 UIU/ML (ref 0.27–4.2)
WBC # BLD: 6.5 E9/L (ref 4.5–11.5)

## 2019-05-18 PROCEDURE — 6360000002 HC RX W HCPCS: Performed by: NEUROLOGICAL SURGERY

## 2019-05-18 PROCEDURE — 93880 EXTRACRANIAL BILAT STUDY: CPT

## 2019-05-18 PROCEDURE — 96375 TX/PRO/DX INJ NEW DRUG ADDON: CPT

## 2019-05-18 PROCEDURE — 80048 BASIC METABOLIC PNL TOTAL CA: CPT

## 2019-05-18 PROCEDURE — 6370000000 HC RX 637 (ALT 250 FOR IP): Performed by: FAMILY MEDICINE

## 2019-05-18 PROCEDURE — 84443 ASSAY THYROID STIM HORMONE: CPT

## 2019-05-18 PROCEDURE — G0378 HOSPITAL OBSERVATION PER HR: HCPCS

## 2019-05-18 PROCEDURE — 6360000004 HC RX CONTRAST MEDICATION: Performed by: RADIOLOGY

## 2019-05-18 PROCEDURE — 84439 ASSAY OF FREE THYROXINE: CPT

## 2019-05-18 PROCEDURE — 99222 1ST HOSP IP/OBS MODERATE 55: CPT | Performed by: NEUROLOGICAL SURGERY

## 2019-05-18 PROCEDURE — 1200000000 HC SEMI PRIVATE

## 2019-05-18 PROCEDURE — 93010 ELECTROCARDIOGRAM REPORT: CPT | Performed by: INTERNAL MEDICINE

## 2019-05-18 PROCEDURE — 94664 DEMO&/EVAL PT USE INHALER: CPT

## 2019-05-18 PROCEDURE — 96361 HYDRATE IV INFUSION ADD-ON: CPT

## 2019-05-18 PROCEDURE — 36415 COLL VENOUS BLD VENIPUNCTURE: CPT

## 2019-05-18 PROCEDURE — 70553 MRI BRAIN STEM W/O & W/DYE: CPT

## 2019-05-18 PROCEDURE — 85025 COMPLETE CBC W/AUTO DIFF WBC: CPT

## 2019-05-18 PROCEDURE — 94640 AIRWAY INHALATION TREATMENT: CPT

## 2019-05-18 PROCEDURE — 83003 ASSAY GROWTH HORMONE (HGH): CPT

## 2019-05-18 PROCEDURE — 96374 THER/PROPH/DIAG INJ IV PUSH: CPT

## 2019-05-18 PROCEDURE — 2580000003 HC RX 258: Performed by: FAMILY MEDICINE

## 2019-05-18 PROCEDURE — 6360000002 HC RX W HCPCS: Performed by: FAMILY MEDICINE

## 2019-05-18 PROCEDURE — A9577 INJ MULTIHANCE: HCPCS | Performed by: RADIOLOGY

## 2019-05-18 PROCEDURE — 85610 PROTHROMBIN TIME: CPT

## 2019-05-18 PROCEDURE — 84146 ASSAY OF PROLACTIN: CPT

## 2019-05-18 PROCEDURE — 84305 ASSAY OF SOMATOMEDIN: CPT

## 2019-05-18 RX ORDER — FINASTERIDE 5 MG/1
5 TABLET, FILM COATED ORAL DAILY
Status: DISCONTINUED | OUTPATIENT
Start: 2019-05-18 | End: 2019-05-19 | Stop reason: HOSPADM

## 2019-05-18 RX ORDER — HYDROCODONE BITARTRATE AND ACETAMINOPHEN 5; 325 MG/1; MG/1
1 TABLET ORAL EVERY 4 HOURS PRN
Status: DISCONTINUED | OUTPATIENT
Start: 2019-05-18 | End: 2019-05-19

## 2019-05-18 RX ORDER — DIPHENHYDRAMINE HCL 25 MG
50 TABLET ORAL NIGHTLY PRN
Status: DISCONTINUED | OUTPATIENT
Start: 2019-05-18 | End: 2019-05-19 | Stop reason: HOSPADM

## 2019-05-18 RX ORDER — SODIUM CHLORIDE 0.9 % (FLUSH) 0.9 %
10 SYRINGE (ML) INJECTION EVERY 12 HOURS SCHEDULED
Status: DISCONTINUED | OUTPATIENT
Start: 2019-05-18 | End: 2019-05-19 | Stop reason: HOSPADM

## 2019-05-18 RX ORDER — ROSUVASTATIN CALCIUM 20 MG/1
40 TABLET, COATED ORAL EVERY EVENING
Status: DISCONTINUED | OUTPATIENT
Start: 2019-05-18 | End: 2019-05-19 | Stop reason: HOSPADM

## 2019-05-18 RX ORDER — IPRATROPIUM BROMIDE AND ALBUTEROL SULFATE 2.5; .5 MG/3ML; MG/3ML
3 SOLUTION RESPIRATORY (INHALATION) EVERY 4 HOURS
Status: DISCONTINUED | OUTPATIENT
Start: 2019-05-18 | End: 2019-05-19 | Stop reason: HOSPADM

## 2019-05-18 RX ORDER — SODIUM CHLORIDE 0.9 % (FLUSH) 0.9 %
10 SYRINGE (ML) INJECTION PRN
Status: DISCONTINUED | OUTPATIENT
Start: 2019-05-18 | End: 2019-05-19 | Stop reason: HOSPADM

## 2019-05-18 RX ORDER — ACETAMINOPHEN 500 MG
1000 TABLET ORAL NIGHTLY PRN
Status: DISCONTINUED | OUTPATIENT
Start: 2019-05-18 | End: 2019-05-19 | Stop reason: HOSPADM

## 2019-05-18 RX ORDER — SODIUM CHLORIDE 9 MG/ML
INJECTION, SOLUTION INTRAVENOUS CONTINUOUS
Status: DISCONTINUED | OUTPATIENT
Start: 2019-05-18 | End: 2019-05-19 | Stop reason: HOSPADM

## 2019-05-18 RX ORDER — LAMIVUDINE 100 MG/1
300 TABLET, FILM COATED ORAL NIGHTLY
Status: DISCONTINUED | OUTPATIENT
Start: 2019-05-18 | End: 2019-05-19 | Stop reason: HOSPADM

## 2019-05-18 RX ORDER — ABACAVIR 300 MG/1
600 TABLET ORAL NIGHTLY
Status: DISCONTINUED | OUTPATIENT
Start: 2019-05-18 | End: 2019-05-19 | Stop reason: HOSPADM

## 2019-05-18 RX ORDER — ACETAMINOPHEN,DIPHENHYDRAMINE HCL 500; 25 MG/1; MG/1
2 TABLET, FILM COATED ORAL NIGHTLY PRN
Status: DISCONTINUED | OUTPATIENT
Start: 2019-05-18 | End: 2019-05-18 | Stop reason: RX

## 2019-05-18 RX ORDER — PANTOPRAZOLE SODIUM 20 MG/1
20 TABLET, DELAYED RELEASE ORAL
Status: DISCONTINUED | OUTPATIENT
Start: 2019-05-18 | End: 2019-05-19 | Stop reason: HOSPADM

## 2019-05-18 RX ORDER — TAMSULOSIN HYDROCHLORIDE 0.4 MG/1
0.8 CAPSULE ORAL NIGHTLY
Status: DISCONTINUED | OUTPATIENT
Start: 2019-05-18 | End: 2019-05-19 | Stop reason: HOSPADM

## 2019-05-18 RX ORDER — ONDANSETRON 2 MG/ML
4 INJECTION INTRAMUSCULAR; INTRAVENOUS EVERY 6 HOURS PRN
Status: DISCONTINUED | OUTPATIENT
Start: 2019-05-18 | End: 2019-05-19 | Stop reason: HOSPADM

## 2019-05-18 RX ORDER — ACETAMINOPHEN 500 MG
500 TABLET ORAL EVERY 6 HOURS PRN
Status: DISCONTINUED | OUTPATIENT
Start: 2019-05-18 | End: 2019-05-19 | Stop reason: HOSPADM

## 2019-05-18 RX ADMIN — ONDANSETRON HYDROCHLORIDE 4 MG: 2 SOLUTION INTRAMUSCULAR; INTRAVENOUS at 05:06

## 2019-05-18 RX ADMIN — Medication 10 ML: at 05:06

## 2019-05-18 RX ADMIN — Medication 10 ML: at 08:13

## 2019-05-18 RX ADMIN — IPRATROPIUM BROMIDE AND ALBUTEROL SULFATE 3 ML: .5; 3 SOLUTION RESPIRATORY (INHALATION) at 05:50

## 2019-05-18 RX ADMIN — FINASTERIDE 5 MG: 5 TABLET, FILM COATED ORAL at 08:13

## 2019-05-18 RX ADMIN — LAMIVUDINE 300 MG: 100 TABLET, FILM COATED ORAL at 05:06

## 2019-05-18 RX ADMIN — IPRATROPIUM BROMIDE AND ALBUTEROL SULFATE 3 ML: .5; 3 SOLUTION RESPIRATORY (INHALATION) at 18:08

## 2019-05-18 RX ADMIN — TAMSULOSIN HYDROCHLORIDE 0.8 MG: 0.4 CAPSULE ORAL at 21:13

## 2019-05-18 RX ADMIN — ROSUVASTATIN CALCIUM 40 MG: 20 TABLET, FILM COATED ORAL at 19:06

## 2019-05-18 RX ADMIN — SODIUM CHLORIDE: 9 INJECTION, SOLUTION INTRAVENOUS at 16:20

## 2019-05-18 RX ADMIN — IPRATROPIUM BROMIDE AND ALBUTEROL SULFATE 3 ML: .5; 3 SOLUTION RESPIRATORY (INHALATION) at 21:15

## 2019-05-18 RX ADMIN — HYDROMORPHONE HYDROCHLORIDE 0.5 MG: 1 INJECTION, SOLUTION INTRAMUSCULAR; INTRAVENOUS; SUBCUTANEOUS at 21:14

## 2019-05-18 RX ADMIN — SODIUM CHLORIDE: 9 INJECTION, SOLUTION INTRAVENOUS at 05:05

## 2019-05-18 RX ADMIN — IPRATROPIUM BROMIDE AND ALBUTEROL SULFATE 3 ML: .5; 3 SOLUTION RESPIRATORY (INHALATION) at 13:24

## 2019-05-18 RX ADMIN — IPRATROPIUM BROMIDE AND ALBUTEROL SULFATE 3 ML: .5; 3 SOLUTION RESPIRATORY (INHALATION) at 09:33

## 2019-05-18 RX ADMIN — ABACAVIR SULFATE 600 MG: 300 TABLET, FILM COATED ORAL at 05:05

## 2019-05-18 RX ADMIN — Medication 10 ML: at 21:13

## 2019-05-18 RX ADMIN — ACETAMINOPHEN 500 MG: 500 TABLET ORAL at 16:18

## 2019-05-18 RX ADMIN — GADOBENATE DIMEGLUMINE 10 ML: 529 INJECTION, SOLUTION INTRAVENOUS at 14:47

## 2019-05-18 RX ADMIN — PANTOPRAZOLE SODIUM 20 MG: 20 TABLET, DELAYED RELEASE ORAL at 06:34

## 2019-05-18 RX ADMIN — DOLUTEGRAVIR SODIUM 50 MG: 50 TABLET, FILM COATED ORAL at 05:05

## 2019-05-18 ASSESSMENT — PAIN - FUNCTIONAL ASSESSMENT
PAIN_FUNCTIONAL_ASSESSMENT: ACTIVITIES ARE NOT PREVENTED
PAIN_FUNCTIONAL_ASSESSMENT: PREVENTS OR INTERFERES WITH ALL ACTIVE AND SOME PASSIVE ACTIVITIES

## 2019-05-18 ASSESSMENT — PAIN SCALES - GENERAL
PAINLEVEL_OUTOF10: 0
PAINLEVEL_OUTOF10: 0
PAINLEVEL_OUTOF10: 2
PAINLEVEL_OUTOF10: 0
PAINLEVEL_OUTOF10: 6
PAINLEVEL_OUTOF10: 4
PAINLEVEL_OUTOF10: 7

## 2019-05-18 ASSESSMENT — PAIN DESCRIPTION - PAIN TYPE
TYPE: ACUTE PAIN

## 2019-05-18 ASSESSMENT — PAIN DESCRIPTION - LOCATION
LOCATION: HEAD
LOCATION: FOOT

## 2019-05-18 ASSESSMENT — PAIN DESCRIPTION - PROGRESSION: CLINICAL_PROGRESSION: GRADUALLY WORSENING

## 2019-05-18 ASSESSMENT — PAIN DESCRIPTION - DESCRIPTORS
DESCRIPTORS: HEADACHE
DESCRIPTORS: ACHING;HEADACHE;PENETRATING
DESCRIPTORS: HEADACHE
DESCRIPTORS: CONSTANT;DISCOMFORT;DULL

## 2019-05-18 ASSESSMENT — PAIN DESCRIPTION - ORIENTATION
ORIENTATION: ANTERIOR
ORIENTATION: RIGHT;LEFT
ORIENTATION: ANTERIOR

## 2019-05-18 ASSESSMENT — PAIN DESCRIPTION - FREQUENCY: FREQUENCY: CONTINUOUS

## 2019-05-18 ASSESSMENT — PAIN DESCRIPTION - ONSET: ONSET: ON-GOING

## 2019-05-18 NOTE — H&P
Hospital Medicine History & Physical      PCP: Karl Gonsalves MD    Date of Admission: 5/17/2019    Date of Service: Pt seen/examined on 5/18/19 and Admitted to Inpatient with expected LOS greater than two midnights due to medical therapy. Chief Complaint:  Dizziness and syncope      History Of Present Illness:      70 y.o. male who presented to [facility] with dizziness and possible syncope. History obtained from the patient. He states he was sitting in a chair and is unsure if he fell asleep or passed out but woke to the table lamp next to him falling. He states he has had worsening dizziness with nausea since early yesterday morning. He denies vision changes, weakness in his extremities; he states he has chronic tinnitus. In the ED, he was found to be hypertensive. A head CT reveals mass effect identified at the level of the sella turcica  likely a pituitary mass. He has PMH of COPD, DVT, GERD, HIV, HLD, HTN. He follows at [place].      Past Medical History:          Diagnosis Date    [facility]     [facility] of lower [facility]) [facility] 2007    post foot surgery      GERD (gastroesophageal reflux disease)     [facility] immunodeficiency virus [facility]     Hyperlipidemia     Hypertension     Prostate enlargement        Past Surgical History:          Procedure Laterality Date    ABDOMEN SURGERY  1970    colon resection d/t paralytic ileus from blunt trauma to abdomen     ANKLE SURGERY Right     COLONOSCOPY      FOOT SURGERY Bilateral     hammertoe    HAMMER TOE SURGERY Left 3/22/2019    CONDYLECTOMY 4TH TOE HAMMERTOE CORRECTION  5TH TOE LEFT FOOT. V TO Y SKIN PLASTY 5TH METATARSAL PHALANGEAL JOINT LEFT FOOT performed by Kailyn Sanders DPM at [address]      LYMPHADENECTOMY Left     axilla & Rt side neck    OTHER SURGICAL HISTORY Left 03/22/2019    miki correction 4th and 5th toes left foot, skin plasty 5th MPJ left foot       Medications Prior to Admission:      Prior to Admission medications    Medication Sig Start Date End Date Taking? Authorizing Provider   finasteride (PROSCAR) 5 MG tablet Take 5 mg by mouth daily   Yes Historical Provider, MD   ipratropium-albuterol (DUONEB) 0.5-2.5 (3) MG/3ML SOLN nebulizer solution Inhale 3 mLs into the lungs every 4 hours 6/28/18  Yes Katie Chan,    omeprazole (PRILOSEC) 20 MG delayed release capsule Take 20 mg by mouth daily   Yes Historical Provider, MD   diphenhydrAMINE-APAP, sleep, (TYLENOL PM EXTRA STRENGTH)  MG tablet Take 2 tablets by mouth nightly as needed for Sleep   Yes Historical Provider, MD   Abacavir-Dolutegravir-Lamivud (TRIUMEQ) 600- MG TABS Take 1 tablet by mouth nightly   Yes Historical Provider, MD   rosuvastatin (CRESTOR) 40 MG tablet Take 40 mg by mouth every evening   Yes Historical Provider, MD   warfarin (COUMADIN) 5 MG tablet Take 5 mg by mouth daily Takes only 2.5mg Tuesday and Thursday, sat and sun  Stopped 5 days pre op  Put on lovenox bridge by physician at [facility]   Yes Historical Provider, MD   tamsulosin (FLOMAX) 0.4 MG capsule Take 0.8 mg by mouth nightly    Yes Historical Provider, MD       Allergies:  Ultram [tramadol hcl]    Social History:      The patient currently lives by himself. TOBACCO:   reports that he has quit smoking. His smoking use included cigarettes. He quit after 40.00 years of use. He has never used smokeless tobacco.  ETOH:   reports that he does not drink alcohol. Family History:       Positive as follows:        Problem Relation Age of Onset   [facility] Breast Cancer Mother     Colon Cancer Mother     Heart Disease Father     Stroke Father     Diabetes Father     Lung Cancer Brother        REVIEW OF SYSTEMS:   Pertinent positives as noted in the HPI. All other systems reviewed and negative.     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 If you have any questions or concerns please feel free to contact me at [phone].

## 2019-05-18 NOTE — ED PROVIDER NOTES
Department of Emergency Medicine   ED  Provider Note  Admit Date/RoomTime: 5/17/2019  8:47 PM  ED Room: 19/19          History of Present Illness:  5/17/19, Time: 9:08 PM         Jordan Richard is a 70 y.o. male presenting to the ED for dizziness, beginning around 5 AM today. The complaint has been intermittent, mild in severity, and worsened by nothing. Pt states that he think he passed out earlier today. Reports that he was dizzy and was sitting in a chair, when he passed out. States that he did not hit his head during that time. Reports that he must have hit the lamp because it fall over. Pt reports that he continues to have dizziness. Pt also has mild nausea. Denies emesis. Pt follows up at the [facility]. Reports that he had stitches removed from his left 4th and 5th toes earlier today. Has hx of COPD, COPD, and hypertension. Denies chest pain, shortness of breath, fever, chills, abdominal pain, emesis, diarrhea, neck pain, and further complaints at this time. Review of Systems:   Pertinent positives and negatives are stated within HPI, all other systems reviewed and are negative.      --------------------------------------------- PAST HISTORY ---------------------------------------------  Past Medical History:  has a past medical history of COPD (chronic obstructive pulmonary disease) ([facility] DVT of lower extremity (deep venous thrombosis) ([facility] GERD (gastroesophageal reflux disease), HIV (human immunodeficiency virus infection) ([facility] Hyperlipidemia, Hypertension, and Prostate enlargement. Past Surgical History:  has a past surgical history that includes Abdomen surgery (1970); lymphadenectomy (Left); Colonoscopy; Foot surgery (Bilateral); Ankle surgery (Right); hernia repair; Heel spur surgery; other surgical history (Left, 03/22/2019); and Hammer toe surgery (Left, 3/22/2019). Social History:  reports that he has quit smoking. His smoking use included cigarettes. He quit after 40.00 years of use.  He

## 2019-05-18 NOTE — PROGRESS NOTES
ADVANCED CARE PLANNING    Patient Name: Domitila Velazquez       YOB: 1955              MRN:    [id number]  Admission Date:  5/17/2019  8:47 PM    Active Diagnoses:  Principal Problem:    Syncope and collapse  Active Problems:    Hyperlipidemia    DVT of lower extremity (deep venous thrombosis) (HCC)    Pituitary mass (HCC)    CKD (chronic kidney disease) stage 2, GFR 60-89 ml/min    COPD without exacerbation (HCC)  Resolved Problems:    * No resolved hospital problems. *      These active diagnoses are of sufficient risk that focused discussion on advanced care planning is indicated in order to allow the patient to thoughtfully consider personal goals of care; and, if situations arise that prevent the patient to personally give input, to ensure appropriate representation of their personal desire for different levels and levels of care. Persons present in discussion: ANDRZEJ Grewal, Family members: none. Discussion: I reviewed his admission for syncope and his desires for ongoing aggressive care, including potential intubation and mechanical ventilation; also discussed who would speak on his behalf should he be unable to do so and discussed what conversations he has had with his family so they understand his desires if such a situation occurred now or in the future. I presented and explained the availability of our palliative care team to him, including the availability of advanced directives forms which he can review with his family and then fill out if they so wish. He does not wish to be intubated/placed on a ventilator. He is willing to receive resuscitative medications and chest compressions. Time Spent on Advanced Planning Documents: 30 minutes    Electronically signed by ANDRZEJ Florez on 5/18/2019 at 11:22 AM    NOTE: This report was transcribed using voice recognition software.  Every effort was made to ensure accuracy; however, inadvertent computerized transcription errors may be present.

## 2019-05-18 NOTE — CONSULTS
[address]                  [address] [place],  [facility]                                  CONSULTATION    PATIENT NAME: Nina Majano                  :        1955  MED REC NO:   [id number]                            ROOM:       5207  ACCOUNT NO:   [de-identified]                           ADMIT DATE: 2019  PROVIDER:     Jorge Capone MD      CONSULT DATE:  2019    NEUROSURGERY CONSULT    REASON FOR CONSULTATION:  Pituitary lesion (brain mass). HISTORY OF PRESENT ILLNESS:  The patient is a 68-year-old gentleman who  presents to the hospital with dizziness, started approximately 04:00  a.m. this morning. He states that he feels like he is spinning around  and the room is still. He also admits with a mild slight headache. He  denies any new numbness, tingling, or weakness or loss of control of  bowel or bladder function. He was subsequently brought to [address] [place] [address].  He had a CT scan of his head where he was found to have  pituitary lesion. As a result of that, Neurosurgery Service was  consulted. PAST MEDICAL HISTORY:  Positive benign prostatic hypertrophy,  hypertension, hyperlipidemia, gastroesophageal reflux, DVT, and COPD. PAST SURGICAL HISTORY:  Positive for hernia repair, hammer toe surgery,  foot surgery, colonoscopy, ankle surgery, and abdominal surgery. FAMILY HISTORY:  Positive for heart disease and diabetes in his father. SOCIAL HISTORY:  He is an ex-smoker, who quit smoking about a year ago. ALLERGIES:  Include ULTRAM.    HOME MEDICATIONS:  Include DuoNeb, Tivicay, Ziagen and Flomax. REVIEW OF SYSTEMS:  HEENT:  Negative for headache, double vision, or  blurred vision. CARDIOVASCULAR:  Negative for chest pain, arrhythmia,  or palpitations. RESPIRATORY:  Negative for shortness of breath,  asthma, bronchitis, or pneumonia.   GASTROINTESTINAL:  Negative for  heartburn, nausea, vomiting, diarrhea, or constipation. GENITOURINARY:   Negative for hematuria or dysuria. HEMATOLOGIC:  Positive for easy  bruising. INFECTIOUS:  Negative for any recent infection. MUSCULOSKELETAL:  Negative for back pain. PSYCHIATRIC:  Negative for  anxiety and depression. NEUROLOGIC:  Negative for seizure and stroke. ENDOCRINE:  Negative for thyroid disorder or diabetes. PHYSICAL EXAMINATION:  VITAL SIGNS:  He is currently afebrile with a T-current of 36.2 degrees  Celsius, respiratory rate is 18, pulse 71, blood pressure is 113/72. GENERAL:  He is resting in bed and he appears to be in his stated age. HEENT:  His head is normocephalic and atraumatic. Pupils are 3 to 2 mm  and reactive. He has no drainage out of her eyes, ears, nose, or  throat. SKIN:  His skin is warm and dry. MUSCULOSKELETAL:  He has gotten good range of motion in his bilateral  upper and lower extremities. ABDOMEN:  Soft, nontender, and nondistended. RESPIRATORY:  He is not using any accessory muscles of respiration. NEUROLOGIC:  He is awake, alert, and oriented x3. Cranial nerves II  through XII are intact bilaterally. Motor exam reveals 5/5 strength in  his bilateral upper and lower extremities. Sensation is grossly intact  to light touch. Reflexes are 1+ and symmetric. Toes are going down. REVIEW OF IMAGING:  He has CT scan of his head that shows pituitary  lesion. ASSESSMENT AND PLAN:  The patient is a 59-year-old gentleman who is  neurologically stable and presented with some dizziness. He has  pituitary lesion identified on the CT scan. We will get an MRI to  better delineate this lesion. We will also obtain some hormonal studies  to determine if he has secretory or pituitary lesion.         Gio Rhodes MD    D: 05/18/2019 10:16:53       T: 05/18/2019 11:17:08     [email or web address]  Job#: [id number]     Doc#: [id number]    CC:

## 2019-05-19 VITALS
OXYGEN SATURATION: 92 % | RESPIRATION RATE: 18 BRPM | DIASTOLIC BLOOD PRESSURE: 81 MMHG | HEART RATE: 80 BPM | TEMPERATURE: 97.2 F | WEIGHT: 293 LBS | SYSTOLIC BLOOD PRESSURE: 136 MMHG | HEIGHT: 77 IN | BODY MASS INDEX: 34.59 KG/M2

## 2019-05-19 LAB
INR BLD: 2.2
PROTHROMBIN TIME: 24.4 SEC (ref 9.3–12.4)

## 2019-05-19 PROCEDURE — G0378 HOSPITAL OBSERVATION PER HR: HCPCS

## 2019-05-19 PROCEDURE — 6370000000 HC RX 637 (ALT 250 FOR IP): Performed by: NEUROLOGICAL SURGERY

## 2019-05-19 PROCEDURE — 94640 AIRWAY INHALATION TREATMENT: CPT

## 2019-05-19 PROCEDURE — 85610 PROTHROMBIN TIME: CPT

## 2019-05-19 PROCEDURE — 99232 SBSQ HOSP IP/OBS MODERATE 35: CPT | Performed by: NEUROLOGICAL SURGERY

## 2019-05-19 PROCEDURE — 2580000003 HC RX 258: Performed by: FAMILY MEDICINE

## 2019-05-19 PROCEDURE — 6370000000 HC RX 637 (ALT 250 FOR IP): Performed by: FAMILY MEDICINE

## 2019-05-19 PROCEDURE — 36415 COLL VENOUS BLD VENIPUNCTURE: CPT

## 2019-05-19 PROCEDURE — 96376 TX/PRO/DX INJ SAME DRUG ADON: CPT

## 2019-05-19 PROCEDURE — 96361 HYDRATE IV INFUSION ADD-ON: CPT

## 2019-05-19 PROCEDURE — 6360000002 HC RX W HCPCS: Performed by: FAMILY MEDICINE

## 2019-05-19 PROCEDURE — 6360000002 HC RX W HCPCS: Performed by: NEUROLOGICAL SURGERY

## 2019-05-19 RX ORDER — OXYCODONE HYDROCHLORIDE AND ACETAMINOPHEN 5; 325 MG/1; MG/1
1 TABLET ORAL EVERY 4 HOURS PRN
Status: DISCONTINUED | OUTPATIENT
Start: 2019-05-19 | End: 2019-05-19 | Stop reason: HOSPADM

## 2019-05-19 RX ORDER — ONDANSETRON 4 MG/1
4 TABLET, FILM COATED ORAL 3 TIMES DAILY PRN
Qty: 30 TABLET | Refills: 0 | Status: SHIPPED | OUTPATIENT
Start: 2019-05-19 | End: 2020-10-01 | Stop reason: ALTCHOICE

## 2019-05-19 RX ADMIN — HYDROMORPHONE HYDROCHLORIDE 0.5 MG: 1 INJECTION, SOLUTION INTRAMUSCULAR; INTRAVENOUS; SUBCUTANEOUS at 01:33

## 2019-05-19 RX ADMIN — SALINE NASAL SPRAY 1 SPRAY: 1.5 SOLUTION NASAL at 10:13

## 2019-05-19 RX ADMIN — PANTOPRAZOLE SODIUM 20 MG: 20 TABLET, DELAYED RELEASE ORAL at 06:09

## 2019-05-19 RX ADMIN — HYDROMORPHONE HYDROCHLORIDE 0.5 MG: 1 INJECTION, SOLUTION INTRAMUSCULAR; INTRAVENOUS; SUBCUTANEOUS at 05:15

## 2019-05-19 RX ADMIN — IPRATROPIUM BROMIDE AND ALBUTEROL SULFATE 3 ML: .5; 3 SOLUTION RESPIRATORY (INHALATION) at 09:29

## 2019-05-19 RX ADMIN — FINASTERIDE 5 MG: 5 TABLET, FILM COATED ORAL at 07:51

## 2019-05-19 RX ADMIN — ONDANSETRON HYDROCHLORIDE 4 MG: 2 SOLUTION INTRAMUSCULAR; INTRAVENOUS at 00:49

## 2019-05-19 RX ADMIN — OXYCODONE HYDROCHLORIDE AND ACETAMINOPHEN 1 TABLET: 5; 325 TABLET ORAL at 10:13

## 2019-05-19 RX ADMIN — IPRATROPIUM BROMIDE AND ALBUTEROL SULFATE 3 ML: .5; 3 SOLUTION RESPIRATORY (INHALATION) at 00:23

## 2019-05-19 RX ADMIN — DIPHENHYDRAMINE HCL 50 MG: 25 TABLET ORAL at 00:08

## 2019-05-19 RX ADMIN — SODIUM CHLORIDE: 9 INJECTION, SOLUTION INTRAVENOUS at 01:35

## 2019-05-19 RX ADMIN — IPRATROPIUM BROMIDE AND ALBUTEROL SULFATE 3 ML: .5; 3 SOLUTION RESPIRATORY (INHALATION) at 04:42

## 2019-05-19 RX ADMIN — Medication 10 ML: at 07:52

## 2019-05-19 RX ADMIN — HYDROCODONE BITARTRATE AND ACETAMINOPHEN 1 TABLET: 5; 325 TABLET ORAL at 00:11

## 2019-05-19 ASSESSMENT — PAIN DESCRIPTION - PAIN TYPE
TYPE: ACUTE PAIN

## 2019-05-19 ASSESSMENT — PAIN SCALES - GENERAL
PAINLEVEL_OUTOF10: 8
PAINLEVEL_OUTOF10: 0
PAINLEVEL_OUTOF10: 0
PAINLEVEL_OUTOF10: 4
PAINLEVEL_OUTOF10: 6
PAINLEVEL_OUTOF10: 7
PAINLEVEL_OUTOF10: 4
PAINLEVEL_OUTOF10: 7

## 2019-05-19 ASSESSMENT — PAIN DESCRIPTION - LOCATION
LOCATION: HEAD

## 2019-05-19 ASSESSMENT — PAIN DESCRIPTION - DESCRIPTORS
DESCRIPTORS: ACHING;HEADACHE;PENETRATING
DESCRIPTORS: ACHING;HEADACHE;THROBBING
DESCRIPTORS: ACHING;HEADACHE;PENETRATING

## 2019-05-19 ASSESSMENT — PAIN - FUNCTIONAL ASSESSMENT
PAIN_FUNCTIONAL_ASSESSMENT: PREVENTS OR INTERFERES WITH MANY ACTIVE NOT PASSIVE ACTIVITIES
PAIN_FUNCTIONAL_ASSESSMENT: PREVENTS OR INTERFERES WITH ALL ACTIVE AND SOME PASSIVE ACTIVITIES
PAIN_FUNCTIONAL_ASSESSMENT: PREVENTS OR INTERFERES WITH ALL ACTIVE AND SOME PASSIVE ACTIVITIES

## 2019-05-19 ASSESSMENT — PAIN DESCRIPTION - PROGRESSION
CLINICAL_PROGRESSION: GRADUALLY WORSENING

## 2019-05-19 ASSESSMENT — PAIN DESCRIPTION - ORIENTATION
ORIENTATION: ANTERIOR

## 2019-05-19 NOTE — PLAN OF CARE
Problem: Falls - Risk of:  Goal: Will remain free from falls  Description  Will remain free from falls  5/19/2019 1026 by Amy Roblero RN  Outcome: Met This Shift     Problem: Pain:  Goal: Control of acute pain  Description  Control of acute pain  5/19/2019 1026 by Amy Roblero RN  Outcome: Met This Shift     Problem: Safety:  Goal: Free from accidental physical injury  Description  Free from accidental physical injury  5/19/2019 1026 by Amy Roblero RN  Outcome: Met This Shift

## 2019-05-19 NOTE — PROGRESS NOTES
Department of Neurosurgery  Attending Progress Note    CHIEF COMPLAINT:    SUBJECTIVE:  Seen today for pituitary tumor    ROS:    OBJECTIVE  Physical  VITALS:  BP (!) 143/81   Pulse 77   Temp 97.1 °F (36.2 °C) (Temporal)   Resp 16   Ht 6' 5\" (1.956 m)   Wt 293 lb (132.9 kg)   SpO2 95%   BMI 34.74 kg/m²   NEUROLOGIC:  Mental Status Exam:  Level of Alertness:   awake  Orientation:   person, place, time  Motor Exam:  Motor exam is symmetrical 5 out of 5 all extremities bilaterally  Sensory:  Sensory intact    Data  CBC:   Lab Results   Component Value Date    WBC 6.5 05/18/2019    RBC 3.74 05/18/2019    HGB 12.8 05/18/2019    HCT 37.9 05/18/2019    .3 05/18/2019    MCH 34.2 05/18/2019    MCHC 33.8 05/18/2019    RDW 14.0 05/18/2019     05/18/2019    MPV 9.1 05/18/2019     BMP:    Lab Results   Component Value Date     05/18/2019    K 3.9 05/18/2019     05/18/2019    CO2 28 05/18/2019    BUN 15 05/18/2019    LABALBU 4.2 05/17/2019    CREATININE 1.3 05/18/2019    CALCIUM 9.2 05/18/2019    GFRAA >60 05/18/2019    LABGLOM 55 05/18/2019    GLUCOSE 153 05/18/2019     Current Inpatient Medications  Current Facility-Administered Medications: oxyCODONE-acetaminophen (PERCOCET) 5-325 MG per tablet 1 tablet, 1 tablet, Oral, Q4H PRN  finasteride (PROSCAR) tablet 5 mg, 5 mg, Oral, Daily  ipratropium-albuterol (DUONEB) nebulizer solution 3 mL, 3 mL, Inhalation, Q4H  pantoprazole (PROTONIX) tablet 20 mg, 20 mg, Oral, QAM AC  rosuvastatin (CRESTOR) tablet 40 mg, 40 mg, Oral, QPM  tamsulosin (FLOMAX) capsule 0.8 mg, 0.8 mg, Oral, Nightly  sodium chloride flush 0.9 % injection 10 mL, 10 mL, Intravenous, 2 times per day  sodium chloride flush 0.9 % injection 10 mL, 10 mL, Intravenous, PRN  magnesium hydroxide (MILK OF MAGNESIA) 400 MG/5ML suspension 30 mL, 30 mL, Oral, Daily PRN  ondansetron (ZOFRAN) injection 4 mg, 4 mg, Intravenous, Q6H PRN  0.9 % sodium chloride infusion, , Intravenous,

## 2019-05-19 NOTE — DISCHARGE SUMMARY
range of motion. No jugular venous distention. Trachea midline. Respiratory:  Normal respiratory effort. Clear to auscultation, bilaterally without Rales/Wheezes/Rhonchi. Cardiovascular: Regular rate and rhythm with normal S1/S2 without murmurs, rubs or gallops. Abdomen: Soft, non-tender, non-distended with normal bowel sounds. Musculoskeletal: No clubbing, cyanosis or edema bilaterally. Full range of motion without deformity. Skin: Skin color, texture, turgor normal.  Chronic vascular changes  Neurologic:  Neurovascularly intact without any focal sensory/motor deficits. 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 See description  above. Mild atrophy and mild chronic microvascular ischemic disease. Carotid US  Atherosclerotic disease. Soft plaque results in mild diameter reduction in the proximal internal carotid arteries bilaterally, slightly more prominent on the left side in the mid internal carotid artery. Estimated stenosis by NASCET criteria in the proximal right carotid  artery is between 0% and 49%. Estimated stenosis by NASCET criteria in the proximal left carotid  artery is between 0% and 49%. Antegrade vertebral flow is confirmed bilaterally. The cardiac rhythm was regular during this exam.    CT head  There is a mass effect identified at the level of the sella turcica likely a pituitary mass    Disposition:  home    Discharge Instructions/Follow-up:  Resume home medications. Follow up with neurosurgery as scheduled    Code Status:  Limited     Activity: activity as tolerated    Diet: regular diet    Labs:  For convenience and continuity at follow-up the following most recent labs are provided:      CBC:    Lab Results   Component Value Date    WBC 6.5 05/18/2019    HGB 12.8 05/18/2019    HCT 37.9 05/18/2019     05/18/2019       Renal:    Lab Results   Component Value Date     05/18/2019    K 3.9 05/18/2019     05/18/2019    CO2 28 05/18/2019    BUN 15 05/18/2019    CREATININE 1.3 05/18/2019    CALCIUM 9.2 05/18/2019       Discharge Medications:     Current Discharge Medication List           Details   ondansetron (ZOFRAN) 4 MG tablet Take 1 tablet by mouth 3 times daily as needed for Nausea or Vomiting  Qty: 30 tablet, Refills: 0              Details   finasteride (PROSCAR) 5 MG tablet Take 5 mg by mouth daily      ipratropium-albuterol (DUONEB) 0.5-2.5 (3) MG/3ML SOLN nebulizer solution Inhale 3 mLs into the lungs every 4 hours  Qty: 360 mL, Refills: 0      omeprazole (PRILOSEC) 20 MG delayed release capsule Take 20 mg by mouth daily      diphenhydrAMINE-APAP, sleep, (TYLENOL PM EXTRA STRENGTH)  MG tablet Take 2 tablets by mouth nightly as needed for Sleep      Abacavir-Dolutegravir-Lamivud (TRIUMEQ) 600- MG TABS Take 1 tablet by mouth nightly      rosuvastatin (CRESTOR) 40 MG tablet Take 40 mg by mouth every evening      warfarin (COUMADIN) 5 MG tablet Take 5 mg by mouth daily Takes only 2.5mg Tuesday and Thursday, sat and sun  Stopped 5 days pre op  Put on lovenox bridge by physician at [facility]      tamsulosin (FLOMAX) 0.4 MG capsule Take 0.8 mg by mouth nightly              Time Spent on discharge is more than 30 minutes in the examination, evaluation, counseling and review of medications and discharge plan. Signed:    ANDRZEJ Forte - CNS   5/19/2019      Thank you Neida Ramirez MD for the opportunity to be involved in this patient's care. If you have any questions or concerns please feel free to contact me at [phone].

## 2019-05-20 LAB
GROWTH HORMONE: <0.05 NG/ML (ref 0.05–3)
IGF-1 (INSULIN-LIKE GROWTH I): 130 NG/ML (ref 40–231)
INSULIN-LIKE GROWTH FACTOR-1 Z-SCORE: 0.4

## 2019-05-30 ENCOUNTER — HOSPITAL ENCOUNTER (EMERGENCY)
Age: 64
Discharge: HOME | End: 2019-05-31
Payer: MEDICARE

## 2019-05-30 VITALS
BODY MASS INDEX: 34.83 KG/M2 | HEIGHT: 77 IN | WEIGHT: 295 LBS | HEART RATE: 90 BPM | SYSTOLIC BLOOD PRESSURE: 124 MMHG | TEMPERATURE: 97.7 F | OXYGEN SATURATION: 93 % | RESPIRATION RATE: 16 BRPM | DIASTOLIC BLOOD PRESSURE: 86 MMHG

## 2019-05-30 DIAGNOSIS — B02.9 HERPES ZOSTER WITHOUT COMPLICATION: Primary | ICD-10-CM

## 2019-05-30 PROCEDURE — 6370000000 HC RX 637 (ALT 250 FOR IP): Performed by: PHYSICIAN ASSISTANT

## 2019-05-30 PROCEDURE — 99282 EMERGENCY DEPT VISIT SF MDM: CPT

## 2019-05-30 RX ORDER — ACYCLOVIR 800 MG/1
800 TABLET ORAL 4 TIMES DAILY
Qty: 40 TABLET | Refills: 0 | Status: SHIPPED | OUTPATIENT
Start: 2019-05-30 | End: 2019-06-09

## 2019-05-30 RX ORDER — HYDROCODONE BITARTRATE AND ACETAMINOPHEN 5; 325 MG/1; MG/1
1 TABLET ORAL EVERY 6 HOURS PRN
Qty: 12 TABLET | Refills: 0 | Status: SHIPPED | OUTPATIENT
Start: 2019-05-30 | End: 2019-06-02

## 2019-05-30 RX ORDER — ACYCLOVIR 200 MG/1
800 CAPSULE ORAL ONCE
Status: COMPLETED | OUTPATIENT
Start: 2019-05-31 | End: 2019-05-30

## 2019-05-30 RX ADMIN — ACYCLOVIR 800 MG: 200 CAPSULE ORAL at 23:54

## 2019-05-30 ASSESSMENT — PAIN DESCRIPTION - DESCRIPTORS: DESCRIPTORS: SHARP;BURNING

## 2019-05-30 ASSESSMENT — PAIN DESCRIPTION - FREQUENCY: FREQUENCY: CONTINUOUS

## 2019-05-30 ASSESSMENT — PAIN SCALES - GENERAL: PAINLEVEL_OUTOF10: 7

## 2019-05-30 ASSESSMENT — PAIN DESCRIPTION - LOCATION: LOCATION: BACK

## 2019-05-31 NOTE — ED PROVIDER NOTES
[facility]  HPI:  5/30/19, Time: 11:42 PM         Warren Mcwilliams is a 70 y.o. male presenting to the ED for rash , beginning today. The complaint has been persistent, moderate in severity, and worsened by nothing. patient comes in with complaint of painful rash. He states it started earlier today on the right side. He denies any fever chills. Also has a small area erythema that is pruritic in nature and not painful under the left axilla. Review of Systems:   Pertinent positives and negatives are stated within HPI, all other systems reviewed and are negative.          --------------------------------------------- PAST HISTORY ---------------------------------------------  Past Medical History:  has a past medical history of COPD (chronic obstructive pulmonary disease) ([facility] [address] DVT of lower extremity (deep venous thrombosis) ([facility] [address] GERD (gastroesophageal reflux disease), [facility] virus [facility] Hyperlipidemia, Hypertension, and Prostate enlargement. Past Surgical History:  has a past surgical history that includes Abdomen surgery (1970); lymphadenectomy (Left); Colonoscopy; Foot surgery (Bilateral); Ankle surgery (Right); hernia repair; Heel spur surgery; other surgical history (Left, 03/22/2019); and Hammer toe surgery (Left, 3/22/2019). Social History:  reports that he has quit smoking. His smoking use included cigarettes. He quit after 40.00 years of use. He has never used smokeless tobacco. He reports that he does not drink alcohol or use drugs. Family History: family history includes Breast Cancer in his mother; Colon Cancer in his mother; Diabetes in his father; Heart Disease in his father; Mick Semaj in his brother; Stroke in his father. The patients home medications have been reviewed.     Allergies: Ultram [tramadol hcl]    -------------------------------------------------- RESULTS -------------------------------------------------  All laboratory and radiology results have been personally reviewed by myself   LABS:  No results found for this visit on 05/30/19. RADIOLOGY:  Interpreted by Radiologist.  No orders to display       ------------------------- NURSING NOTES AND VITALS REVIEWED ---------------------------   The nursing notes within the ED encounter and vital signs as below have been reviewed. /86   Pulse 90   Temp 97.7 °F (36.5 °C) (Oral)   Resp 16   Ht 6' 5\" (1.956 m)   Wt 295 lb (133.8 kg)   SpO2 93%   BMI 34.98 kg/m²   Oxygen Saturation Interpretation: Normal      ---------------------------------------------------PHYSICAL EXAM--------------------------------------      Constitutional/General: Alert and oriented x3, well appearing, non toxic in NAD  Head: Normocephalic and atraumatic  Eyes: PERRL, EOMI  Mouth: Oropharynx clear, handling secretions, no trismus  Neck: Supple, full ROM,   Pulmonary: Lungs clear to auscultation bilaterally, no wheezes, rales, or rhonchi. Not in respiratory distress  Cardiovascular:  Regular rate and rhythm, no murmurs, gallops, or rubs. 2+ distal pulses  Abdomen: Soft, non tender, non distended,   Extremities: Moves all extremities x 4. Warm and well perfused  Skin: warm and dry rash of the right chest chest wall appears herpetic in nature. Small vesicles on top of an erythmatous base . There is no vesicles present  Neurologic: GCS 15,  Psych: Normal Affect      ------------------------------ ED COURSE/MEDICAL DECISION MAKING----------------------  Medications   acyclovir (ZOVIRAX) capsule 800 mg (800 mg Oral Given 5/30/19 8805)         ED COURSE:       Medical Decision Making:   Patient came in with complaint of rash. Rash on the right chest wall appears to be shingles with headache appearing lesions. Rash under the left arm and does not appear herpetic patient states the rash to the left arm has some mild pruritus there is no pain present.  Patient has history of HIV was treated for shingles placed on acyclovir and discharged with [place] to follow-up with primary care 1-2 days. Counseling: The emergency provider has spoken with the patient and discussed todays results, in addition to providing specific details for the plan of care and counseling regarding the diagnosis and prognosis. Questions are answered at this time and they are agreeable with the plan.      --------------------------------- IMPRESSION AND DISPOSITION ---------------------------------    IMPRESSION  1. Herpes zoster without complication        DISPOSITION  Disposition: Discharge to home  Patient condition is good      NOTE: This report was transcribed using voice recognition software. Every effort was made to ensure accuracy; however, inadvertent computerized transcription errors may be present     Jama Nealma  05/31/19 0136    ATTENDING PROVIDER ATTESTATION:     Supervising Physician, on-site, available for consultation, non-participatory in the evaluation or care of this patient.          Harish Loja DO  05/31/19 5719

## 2019-06-26 NOTE — ED PROVIDER NOTES
This is a 70year old male with a PMH of HIV, HTN and DVTs who presents to the ED for evaluation of fatigue. Eliana remarks that today he felt subjective fevers and chills. He states that he took hot and cold showers which did not seem to improve his symptoms. He states that he has felt congested today and lightheaded as well. He states that his lightheadness is not improved or worsened with any activity. The patinet remarks that he has no new cough, no chest pain or leg pain. The patient states that he does not feel short of breath. He states that he does feel nauseated but denies any vomiting, diarrhea, or constipation. He states htat he is eating well and has been using his inhalers at home and has been compliant with her anti retorvirals. He states that his CD4 count and viral load have been great. He states he has multiple ill contacts. The history is provided by the patient. No  was used. Review of Systems   Constitutional: Positive for fatigue and fever. HENT: Positive for congestion. Eyes: Negative for visual disturbance. Respiratory: Negative for cough and shortness of breath. Cardiovascular: Negative for chest pain. Gastrointestinal: Negative for abdominal pain and nausea. Endocrine: Negative for polyuria. Genitourinary: Negative for dysuria. Musculoskeletal: Negative for back pain. Skin: Negative for rash. Allergic/Immunologic: Positive for immunocompromised state. Neurological: Positive for light-headedness. Hematological: Bruises/bleeds easily. Psychiatric/Behavioral: Negative for confusion. Physical Exam   Constitutional: He is oriented to person, place, and time. He appears well-developed and well-nourished. No distress. HENT:   Head: Normocephalic and atraumatic. Mouth/Throat: Oropharynx is clear and moist.   Eyes: Pupils are equal, round, and reactive to light. EOM are normal.   Neck: Normal range of motion. Neck supple.  No JVD Abdomen surgery; Rectal surgery; and lymphadenectomy (Left). Social History:  reports that he has quit smoking. His smoking use included Cigarettes. He smoked 1.00 pack per day. He has never used smokeless tobacco. He reports that he does not drink alcohol or use drugs. Family History: family history is not on file. The patients home medications have been reviewed.     Allergies: Ultram [tramadol hcl]    -------------------------------------------------- RESULTS -------------------------------------------------  Labs:  Results for orders placed or performed during the hospital encounter of 02/12/19   Rapid influenza A/B antigens   Result Value Ref Range    Influenza A by PCR Not Detected Not Detected    Influenza B by PCR Not Detected Not Detected   Comprehensive Metabolic Panel   Result Value Ref Range    Sodium 138 132 - 146 mmol/L    Potassium 3.8 3.5 - 5.0 mmol/L    Chloride 104 98 - 107 mmol/L    CO2 23 22 - 29 mmol/L    Anion Gap 11 7 - 16 mmol/L    Glucose 144 (H) 74 - 99 mg/dL    BUN 20 8 - 23 mg/dL    CREATININE 1.2 0.7 - 1.2 mg/dL    GFR Non-African American >60 >=60 mL/min/1.73    GFR African American >60     Calcium 9.4 8.6 - 10.2 mg/dL    Total Protein 7.3 6.4 - 8.3 g/dL    Alb 3.9 3.5 - 5.2 g/dL    Total Bilirubin 0.7 0.0 - 1.2 mg/dL    Alkaline Phosphatase 52 40 - 129 U/L    ALT 21 0 - 40 U/L    AST 26 0 - 39 U/L   CBC auto differential   Result Value Ref Range    WBC 7.2 4.5 - 11.5 E9/L    RBC 4.01 3.80 - 5.80 E12/L    Hemoglobin 13.5 12.5 - 16.5 g/dL    Hematocrit 38.5 37.0 - 54.0 %    MCV 96.0 80.0 - 99.9 fL    MCH 33.7 26.0 - 35.0 pg    MCHC 35.1 (H) 32.0 - 34.5 %    RDW 13.9 11.5 - 15.0 fL    Platelets 357 823 - 776 E9/L    MPV 9.0 7.0 - 12.0 fL    Neutrophils % 66.7 43.0 - 80.0 %    Immature Granulocytes % 0.4 0.0 - 5.0 %    Lymphocytes % 23.7 20.0 - 42.0 %    Monocytes % 5.9 2.0 - 12.0 %    Eosinophils % 2.9 0.0 - 6.0 %    Basophils % 0.4 0.0 - 2.0 %    Neutrophils # 4.78 1.80 - 7.30 E9/L    Immature Granulocytes # 0.03 E9/L    Lymphocytes # 1.70 1.50 - 4.00 E9/L    Monocytes # 0.42 0.10 - 0.95 E9/L    Eosinophils # 0.21 0.05 - 0.50 E9/L    Basophils # 0.03 0.00 - 0.20 E9/L   Lactic Acid, Plasma   Result Value Ref Range    Lactic Acid 0.9 0.5 - 2.2 mmol/L   Troponin   Result Value Ref Range    Troponin <0.01 0.00 - 0.03 ng/mL   Urinalysis   Result Value Ref Range    Color, UA Yellow Straw/Yellow    Clarity, UA Clear Clear    Glucose, Ur Negative Negative mg/dL    Bilirubin Urine Negative Negative    Ketones, Urine Negative Negative mg/dL    Specific Gravity, UA 1.015 1.005 - 1.030    Blood, Urine SMALL (A) Negative    pH, UA 6.0 5.0 - 9.0    Protein, UA Negative Negative mg/dL    Urobilinogen, Urine 0.2 <2.0 E.U./dL    Nitrite, Urine Negative Negative    Leukocyte Esterase, Urine Negative Negative   Lipase   Result Value Ref Range    Lipase 37 13 - 60 U/L   Protime-INR   Result Value Ref Range    Protime 22.7 (H) 9.3 - 12.4 sec    INR 2.0    Microscopic Urinalysis   Result Value Ref Range    WBC, UA NONE 0 - 5 /HPF    RBC, UA 0-1 0 - 2 /HPF    Epi Cells NONE /HPF    Bacteria, UA NONE /HPF   EKG 12 Lead   Result Value Ref Range    Ventricular Rate 72 BPM    Atrial Rate 72 BPM    P-R Interval 194 ms    QRS Duration 84 ms    Q-T Interval 390 ms    QTc Calculation (Bazett) 427 ms    P Axis 62 degrees    R Axis 20 degrees    T Axis 51 degrees       Radiology:  XR CHEST PORTABLE   Final Result   No acute cardiopulmonary disease.             ------------------------- NURSING NOTES AND VITALS REVIEWED ---------------------------  Date / Time Roomed:  2/12/2019 10:52 PM  ED Bed Assignment:  04/04    The nursing notes within the ED encounter and vital signs as below have been reviewed.    BP (!) 156/94   Pulse 67   Temp 99 °F (37.2 °C) (Oral)   Resp 14   Ht 6' 5\" (1.956 m)   Wt 280 lb (127 kg)   SpO2 94%   BMI 33.20 kg/m²   Oxygen Saturation Interpretation: Normal      ------------------------------------------ PROGRESS NOTES ------------------------------------------  12:41 AM  I have spoken with the patient and discussed todays results, in addition to providing specific details for the plan of care and counseling regarding the diagnosis and prognosis. Their questions are answered at this time and they are agreeable with the plan. I discussed at length with them reasons for immediate return here for re evaluation. They will followup with their PCP.      --------------------------------- ADDITIONAL PROVIDER NOTES ---------------------------------  At this time the patient is without objective evidence of an acute process requiring hospitalization or inpatient management. They have remained hemodynamically stable throughout their entire ED visit and are stable for discharge with outpatient follow-up. The plan has been discussed in detail and they are aware of the specific conditions for emergent return, as well as the importance of follow-up. New Prescriptions    No medications on file       Diagnosis:  1. Dizziness        Disposition:  Patient's disposition: Discharge to home  Patient's condition is stable. EKG Interpretation. EKG: This EKG is signed and interpreted by me. Rate: 72  Rhythm: Sinus  Interpretation: no acute changes  Comparison: was normal          Nishant Ott DO  Resident  02/13/19 [id number]      ATTENDING PROVIDER ATTESTATION:     I have personally performed and/or participated in the history, exam, medical decision making, and procedures and agree with all pertinent clinical information. I have also reviewed and agree with the past medical, family and social history unless otherwise noted. I have discussed this patient in detail with the resident, and provided the instruction and education regarding dizziness and near syncope. My findings/Plan: Heart RRR. Lungs CTA bilaterally. Abdomen soft. Nontender.  Bowel sounds normal. Supportive treatment. IV fluids. Meclizine. Patient ambulates around department without difficulty. Will discharge for outpatient follow up.          Perrin Sussy, DO  02/13/19 4550 0 = independent

## 2020-10-01 ENCOUNTER — OFFICE VISIT (OUTPATIENT)
Dept: ENT CLINIC | Age: 65
End: 2020-10-01
Payer: OTHER GOVERNMENT

## 2020-10-01 ENCOUNTER — PROCEDURE VISIT (OUTPATIENT)
Dept: AUDIOLOGY | Age: 65
End: 2020-10-01
Payer: OTHER GOVERNMENT

## 2020-10-01 VITALS — HEIGHT: 78 IN | BODY MASS INDEX: 32.42 KG/M2 | WEIGHT: 280.2 LBS

## 2020-10-01 PROCEDURE — 99203 OFFICE O/P NEW LOW 30 MIN: CPT | Performed by: NURSE PRACTITIONER

## 2020-10-01 PROCEDURE — 92567 TYMPANOMETRY: CPT | Performed by: AUDIOLOGIST

## 2020-10-01 RX ORDER — LISINOPRIL 20 MG/1
20 TABLET ORAL DAILY
COMMUNITY

## 2020-10-01 RX ORDER — AZELASTINE 1 MG/ML
2 SPRAY, METERED NASAL 2 TIMES DAILY
Qty: 1 BOTTLE | Refills: 1 | Status: SHIPPED
Start: 2020-10-01 | End: 2020-12-04 | Stop reason: SDUPTHER

## 2020-10-01 RX ORDER — TIOTROPIUM BROMIDE INHALATION SPRAY 1.56 UG/1
2 SPRAY, METERED RESPIRATORY (INHALATION) DAILY
COMMUNITY

## 2020-10-01 RX ORDER — FLUTICASONE PROPIONATE 50 MCG
2 SPRAY, SUSPENSION (ML) NASAL DAILY
COMMUNITY

## 2020-10-01 RX ORDER — HYDROCHLOROTHIAZIDE 25 MG/1
25 TABLET ORAL DAILY
COMMUNITY

## 2020-10-01 ASSESSMENT — ENCOUNTER SYMPTOMS
RHINORRHEA: 0
SINUS PRESSURE: 0
SHORTNESS OF BREATH: 0
STRIDOR: 0
EYES NEGATIVE: 1
RESPIRATORY NEGATIVE: 1
SINUS PAIN: 0
ABDOMINAL PAIN: 0
GASTROINTESTINAL NEGATIVE: 1

## 2020-10-01 NOTE — PROGRESS NOTES
Never Used   Substance Use Topics    Alcohol use: No    Drug use: No     Family History   Problem Relation Age of Onset    Breast Cancer Mother     Colon Cancer Mother     Heart Disease Father     Stroke Father     Diabetes Father     Lung Cancer Brother        Review of Systems   Constitutional: Negative. Negative for activity change and appetite change. HENT: Positive for congestion. Negative for ear pain, postnasal drip, rhinorrhea, sinus pressure and sinus pain. Eyes: Negative. Respiratory: Negative. Negative for shortness of breath and stridor. Cardiovascular: Negative. Negative for chest pain and palpitations. Gastrointestinal: Negative. Negative for abdominal pain. Endocrine: Negative. Genitourinary: Negative. Skin: Negative. Neurological: Negative. Negative for dizziness. Hematological: Negative. Psychiatric/Behavioral: Negative. Ht 6' 6\" (1.981 m)   Wt 280 lb 3.2 oz (127.1 kg)   BMI 32.38 kg/m²   Physical Exam  Constitutional:       Appearance: Normal appearance. HENT:      Head: Normocephalic. Right Ear: Ear canal and external ear normal. A middle ear effusion is present. Left Ear: Ear canal and external ear normal. A middle ear effusion is present. Ears:      Comments: Tympanogram reveals high ear canal volume in the right ear, however there is no perforation visualized on exam     Nose: Septal deviation present. Right Turbinates: Enlarged. Left Turbinates: Enlarged. Mouth/Throat:      Lips: Pink. Pharynx: Oropharynx is clear. Eyes:      Conjunctiva/sclera: Conjunctivae normal.      Pupils: Pupils are equal, round, and reactive to light. Neck:      Musculoskeletal: Normal range of motion. No neck rigidity or muscular tenderness. Cardiovascular:      Rate and Rhythm: Normal rate and regular rhythm. Pulses: Normal pulses. Pulmonary:      Effort: Pulmonary effort is normal. No respiratory distress. Breath sounds: Normal breath sounds. No stridor. Abdominal:      Palpations: Abdomen is soft. Tenderness: There is no abdominal tenderness. Skin:     General: Skin is warm and dry. Neurological:      General: No focal deficit present. Mental Status: He is alert and oriented to person, place, and time. Psychiatric:         Mood and Affect: Mood normal.         Behavior: Behavior normal.         Thought Content: Thought content normal.         Judgment: Judgment normal.           Tympanogram reveals flat curves bilaterally. IMPRESSION/PLAN:      Kristina Alcaraz was seen today for dizziness and sinus problem. Diagnoses and all orders for this visit:    DNS (deviated nasal septum)    Hypertrophy of nasal turbinates    Bilateral serous otitis media, unspecified chronicity    Other orders  -     azelastine (ASTELIN) 0.1 % nasal spray; 2 sprays by Nasal route 2 times daily Use in each nostril as directed    Mary Morales is seen today for chronic sinus congestion and dizziness. Exam he is found to have bilateral middle ear effusions without erythema of either TM. There is no erythema or edema of either ear canal.  He does exhibit a moderate rightward nasal septal deviation with enlarged inferior turbinates bilaterally. There is no rhinorrhea present or postnasal drainage noted in the oropharynx. At this time patient is to continue with his current regimen of Flonase and intermittent Benadryl for his allergy symptoms and will be placed on Astelin spray, 2 sprays each nostril once daily at bedtime which she may increase to twice daily as it improves his symptoms. He is also asked to sign a release so that a copy of his sinus CT from the [place] may be obtained. He will follow-up in 1 month to assess therapy and review his sinus CT. He is instructed to call with any new or worsening of symptoms prior to his next appointment.     Justin Jalloh, MSN, FNP-C  [facility], Nose and Throat    The information contained in this note has been dictated using drug and medical speech recognition software and may contain errors

## 2020-10-08 ENCOUNTER — TELEPHONE (OUTPATIENT)
Dept: ENT CLINIC | Age: 65
End: 2020-10-08

## 2020-10-08 NOTE — TELEPHONE ENCOUNTER
Pt left a message that he needed a phone number [facility] [place] [facility] and talk with Tiki Block about the ct scan he wants.       Called pt back and gave him the office number

## 2020-10-19 ENCOUNTER — TELEPHONE (OUTPATIENT)
Dept: ENT CLINIC | Age: 65
End: 2020-10-19

## 2020-10-19 NOTE — TELEPHONE ENCOUNTER
Order for CT of the sinuses placed. Please advise patient and schedule prior to next appt. Reschedule follow-up if needed so that CT is completed.

## 2020-11-03 PROBLEM — E78.5 HYPERLIPIDEMIA: Status: RESOLVED | Noted: 2020-11-03 | Resolved: 2020-11-03

## 2020-12-04 ENCOUNTER — OFFICE VISIT (OUTPATIENT)
Dept: ENT CLINIC | Age: 65
End: 2020-12-04
Payer: COMMERCIAL

## 2020-12-04 ENCOUNTER — PROCEDURE VISIT (OUTPATIENT)
Dept: AUDIOLOGY | Age: 65
End: 2020-12-04
Payer: COMMERCIAL

## 2020-12-04 VITALS — TEMPERATURE: 97.6 F | HEIGHT: 78 IN | WEIGHT: 284 LBS | BODY MASS INDEX: 32.86 KG/M2

## 2020-12-04 PROCEDURE — 4040F PNEUMOC VAC/ADMIN/RCVD: CPT | Performed by: NURSE PRACTITIONER

## 2020-12-04 PROCEDURE — 1123F ACP DISCUSS/DSCN MKR DOCD: CPT | Performed by: NURSE PRACTITIONER

## 2020-12-04 PROCEDURE — G8427 DOCREV CUR MEDS BY ELIG CLIN: HCPCS | Performed by: NURSE PRACTITIONER

## 2020-12-04 PROCEDURE — G8484 FLU IMMUNIZE NO ADMIN: HCPCS | Performed by: NURSE PRACTITIONER

## 2020-12-04 PROCEDURE — 1036F TOBACCO NON-USER: CPT | Performed by: NURSE PRACTITIONER

## 2020-12-04 PROCEDURE — 3017F COLORECTAL CA SCREEN DOC REV: CPT | Performed by: NURSE PRACTITIONER

## 2020-12-04 PROCEDURE — 92567 TYMPANOMETRY: CPT | Performed by: AUDIOLOGIST

## 2020-12-04 PROCEDURE — 99213 OFFICE O/P EST LOW 20 MIN: CPT | Performed by: NURSE PRACTITIONER

## 2020-12-04 PROCEDURE — G8417 CALC BMI ABV UP PARAM F/U: HCPCS | Performed by: NURSE PRACTITIONER

## 2020-12-04 RX ORDER — AZELASTINE 1 MG/ML
2 SPRAY, METERED NASAL 2 TIMES DAILY
Qty: 3 BOTTLE | Refills: 2 | Status: SHIPPED | OUTPATIENT
Start: 2020-12-04

## 2020-12-04 ASSESSMENT — ENCOUNTER SYMPTOMS
GASTROINTESTINAL NEGATIVE: 1
ABDOMINAL PAIN: 0
SINUS PRESSURE: 0
STRIDOR: 0
SINUS PAIN: 0
RESPIRATORY NEGATIVE: 1
EYES NEGATIVE: 1
RHINORRHEA: 0
SHORTNESS OF BREATH: 0

## 2020-12-04 NOTE — PROGRESS NOTES
[facility] Otolaryngology  Dr. Itzel Solis. Colleen Fuentes. Ms.Ed        Patient Name:  Raoul Hart  :  1955     CHIEF C/O:    Chief Complaint   Patient presents with    Results     ct results    Ear Problem     left ear keeps getting plugged       HISTORY OBTAINED FROM:  patient    HISTORY OF PRESENT ILLNESS:       Dayanara Banerjee is a 77y.o. year old male, here today for follow up of CT results, sinus congestion, and ear fullness. He was last seen 6 weeks ago and restarted his Flonase daily and was placed on Astelin spray daily. He states since starting this regimen he has noticed a decrease in his congestion, rhinorrhea, and postnasal drainage. He states he only notices intermittent postnasal drainage when he forgets to take the medication. He does complain of less pressure in his ears but states his left ear continues to intermittently feel plugged. He denies any pain or drainage.       Past Medical History:   Diagnosis Date    [facility] obstructive [facility] [facility]     DVT of lower extremity (deep venous thrombosis) ([facility] [address] [facility] 2007    post foot surgery      GERD (gastroesophageal reflux disease)     HIV (human immunodeficiency virus infection) [facility] [facility]     Hyperlipidemia     Hypertension     Prostate enlargement      Past Surgical History:   Procedure Laterality Date    ABDOMEN SURGERY  1970    colon resection d/t paralytic ileus from blunt trauma to abdomen     ANKLE SURGERY Right     COLONOSCOPY      FOOT SURGERY Bilateral     hammertoe    HAMMER TOE SURGERY Left 3/22/2019    CONDYLECTOMY 4TH TOE HAMMERTOE CORRECTION  5TH TOE LEFT FOOT. V TO Y SKIN PLASTY 5TH METATARSAL PHALANGEAL JOINT LEFT FOOT performed by Josefina Perdue DPM at [address]      LYMPHADENECTOMY Left     axilla & Rt side neck    OTHER SURGICAL HISTORY Left 2019    hammertoe correction 4th and 5th toes left foot, skin plasty 5th MPJ left foot       Current Outpatient Medications:     Diphenhydramine-Pseudoephed (BENADRYL ALLERGY/SINUS PO), Take 10 mg by mouth 3 times daily, Disp: , Rfl:     hydroCHLOROthiazide (HYDRODIURIL) 25 MG tablet, Take 25 mg by mouth daily, Disp: , Rfl:     lisinopril (PRINIVIL;ZESTRIL) 20 MG tablet, Take 20 mg by mouth daily, Disp: , Rfl:     sodium chloride (OCEAN, BABY AYR) 0.65 % nasal spray, 2 sprays by Nasal route as needed for Congestion (5 times daily), Disp: , Rfl:     fluticasone (FLONASE) 50 MCG/ACT nasal spray, 2 sprays by Each Nostril route daily, Disp: , Rfl:     tiotropium (SPIRIVA RESPIMAT) 1.25 MCG/ACT AERS inhaler, Inhale 2 puffs into the lungs daily, Disp: , Rfl:     azelastine (ASTELIN) 0.1 % nasal spray, 2 sprays by Nasal route 2 times daily Use in each nostril as directed, Disp: 1 Bottle, Rfl: 1    omeprazole (PRILOSEC) 20 MG delayed release capsule, Take 20 mg by mouth daily, Disp: , Rfl:     Abacavir-Dolutegravir-Lamivud (TRIUMEQ) 600- MG TABS, Take 1 tablet by mouth nightly, Disp: , Rfl:     rosuvastatin (CRESTOR) 40 MG tablet, Take 40 mg by mouth every evening, Disp: , Rfl:     warfarin (COUMADIN) 5 MG tablet, Take 5 mg by mouth daily Takes only 2.5mg Tuesday and Thursday, sat and sun Stopped 5 days pre op Put on lovenox bridge by physician at [facility], Disp: , Rfl:   Cat hair extract; Seasonal; and Ultram [tramadol hcl]  Social History     Tobacco Use    Smoking status: Former Smoker     Years: 40.00     Types: Cigarettes    Smokeless tobacco: Never Used   Substance Use Topics    Alcohol use: No    Drug use: No     Family History   Problem Relation Age of Onset    Breast Cancer Mother     Colon Cancer Mother     Heart Disease Father     Stroke Father     Diabetes Father     Lung Cancer Brother        Review of Systems   Constitutional: Negative. Negative for activity change and appetite change. HENT: Positive for postnasal drip.  Negative for congestion, ear pain, rhinorrhea, sinus pressure and sinus pain. Eyes: Negative. Respiratory: Negative. Negative for shortness of breath and stridor. Cardiovascular: Negative. Negative for chest pain and palpitations. Gastrointestinal: Negative. Negative for abdominal pain. Endocrine: Negative. Genitourinary: Negative. Skin: Negative. Neurological: Negative. Negative for dizziness. Hematological: Negative. Psychiatric/Behavioral: Negative. Temp 97.6 °F (36.4 °C)   Ht 6' 6\" (1.981 m)   Wt 284 lb (128.8 kg)   BMI 32.82 kg/m²   Physical Exam  Constitutional:       Appearance: Normal appearance. HENT:      Head: Normocephalic. Right Ear: Tympanic membrane, ear canal and external ear normal. No middle ear effusion. Left Ear: Tympanic membrane, ear canal and external ear normal.  No middle ear effusion. Ears:      Comments: Tympanogram reveals high ear canal volume in the right ear, however there is no perforation visualized on exam     Nose: Septal deviation present. Right Turbinates: Enlarged. Left Turbinates: Enlarged. Comments: Dry appearing sinuses     Mouth/Throat:      Lips: Pink. Pharynx: Oropharynx is clear. Eyes:      Conjunctiva/sclera: Conjunctivae normal.      Pupils: Pupils are equal, round, and reactive to light. Neck:      Musculoskeletal: Normal range of motion. No neck rigidity or muscular tenderness. Cardiovascular:      Rate and Rhythm: Normal rate and regular rhythm. Pulses: Normal pulses. Pulmonary:      Effort: Pulmonary effort is normal. No respiratory distress. Breath sounds: Normal breath sounds. No stridor. Abdominal:      Palpations: Abdomen is soft. Tenderness: There is no abdominal tenderness. Skin:     General: Skin is warm and dry. Neurological:      General: No focal deficit present. Mental Status: He is alert and oriented to person, place, and time.    Psychiatric:         Mood and Affect: Mood normal. Behavior: Behavior normal.         Thought Content: Thought content normal.         Judgment: Judgment normal.       CT Sinus:  Narrative    EXAMINATION:    CT OF THE SINUS WITHOUT CONTRAST  10/27/2020 9:59 am         TECHNIQUE:    CT of the sinuses was performed without the administration of intravenous    contrast. Multiplanar reformatted images are provided for review. Dose    modulation, iterative reconstruction, and/or weight based adjustment of the    mA/kV was utilized to reduce the radiation dose to as low as reasonably    achievable.         COMPARISON:    None         HISTORY:    ORDERING SYSTEM PROVIDED HISTORY: DNS (deviated nasal septum)    TECHNOLOGIST PROVIDED HISTORY:    Reason for exam:->DNS, hypertrophy of nasal turbinates         FINDINGS:    SINUSES/MASTOIDS: Mucosal thickening seen in sphenoid sinuses.  The    maxillary, ethmoid and frontal sinuses are clear.  The bilateral ostiomeatal    units are patent.  Ethmoid roofs are symmetric.  The mastoid air cells are    well aerated.         Nasal septum is slightly deviated to the left.         SOFT TISSUES:  Visualized soft tissues demonstrate no acute abnormality.  The    visualized portion of the intracranial contents demonstrate no gross acute    abnormality.              Impression    Mucosal thickening of sphenoid sinuses suggests  sinusitis.                       Tympanogram reviewed with patient. Type a curves bilaterally. IMPRESSION/PLAN:    Alonso Shipley was seen today for results and ear problem. Diagnoses and all orders for this visit:    Chronic sphenoidal sinusitis    DNS (deviated nasal septum)    Dysfunction of both eustachian tubes    Other orders  -     azelastine (ASTELIN) 0.1 % nasal spray; 2 sprays by Nasal route 2 times daily Use in each nostril as directed      Alonso Shipley is seen today for CT results as well as follow-up of nasal sinus congestion and middle ear effusions bilaterally.   Upon exam bilateral middle ear effusions have

## 2020-12-04 NOTE — PROGRESS NOTES
This patient was referred for tympanometric testing by RENETTA Rojas due to bilateral ear pressure. Patient has noticed an improvement since his last visit, after using the prescribed medication. Tympanometry revealed normal middle ear peak pressure and compliance, bilaterally. Ipsilateral acoustic reflexes were absent, bilaterally at 1000Hz. The results were reviewed with the patient. Recommendations for follow up will be made pending physician consult.     Toby Shen CCC/VIJAYA  Audiologist  [id number]  NPI#:  [id number]

## 2020-12-11 ENCOUNTER — TELEPHONE (OUTPATIENT)
Dept: ENT CLINIC | Age: 65
End: 2020-12-11

## 2020-12-11 NOTE — TELEPHONE ENCOUNTER
Patient called in and would like a script sent to his pharmacy   For [address] sinus rinse      Please advise

## 2020-12-14 RX ORDER — SOD CHLOR,BICARB/SQUEEZ BOTTLE
1 PACKET, WITH RINSE DEVICE NASAL DAILY
Qty: 1 KIT | Refills: 1 | Status: SHIPPED | OUTPATIENT
Start: 2020-12-14

## 2021-03-04 ENCOUNTER — OFFICE VISIT (OUTPATIENT)
Dept: ENT CLINIC | Age: 66
End: 2021-03-04
Payer: COMMERCIAL

## 2021-03-04 VITALS
DIASTOLIC BLOOD PRESSURE: 82 MMHG | WEIGHT: 292 LBS | HEIGHT: 78 IN | BODY MASS INDEX: 33.78 KG/M2 | SYSTOLIC BLOOD PRESSURE: 118 MMHG

## 2021-03-04 DIAGNOSIS — J34.2 DNS (DEVIATED NASAL SEPTUM): ICD-10-CM

## 2021-03-04 DIAGNOSIS — J32.3 CHRONIC SPHENOIDAL SINUSITIS: Primary | ICD-10-CM

## 2021-03-04 DIAGNOSIS — J34.3 HYPERTROPHY OF BOTH INFERIOR NASAL TURBINATES: ICD-10-CM

## 2021-03-04 PROCEDURE — 99213 OFFICE O/P EST LOW 20 MIN: CPT | Performed by: NURSE PRACTITIONER

## 2021-03-04 PROCEDURE — 3017F COLORECTAL CA SCREEN DOC REV: CPT | Performed by: NURSE PRACTITIONER

## 2021-03-04 PROCEDURE — 1036F TOBACCO NON-USER: CPT | Performed by: NURSE PRACTITIONER

## 2021-03-04 PROCEDURE — 4040F PNEUMOC VAC/ADMIN/RCVD: CPT | Performed by: NURSE PRACTITIONER

## 2021-03-04 PROCEDURE — 1123F ACP DISCUSS/DSCN MKR DOCD: CPT | Performed by: NURSE PRACTITIONER

## 2021-03-04 PROCEDURE — G8417 CALC BMI ABV UP PARAM F/U: HCPCS | Performed by: NURSE PRACTITIONER

## 2021-03-04 PROCEDURE — G8484 FLU IMMUNIZE NO ADMIN: HCPCS | Performed by: NURSE PRACTITIONER

## 2021-03-04 PROCEDURE — G8427 DOCREV CUR MEDS BY ELIG CLIN: HCPCS | Performed by: NURSE PRACTITIONER

## 2021-03-04 ASSESSMENT — ENCOUNTER SYMPTOMS: RHINORRHEA: 1

## 2021-03-04 NOTE — PROGRESS NOTES
[facility] Otolaryngology  Dr. Maco Aguilar. Luca Ansari. Ms.Ed        Patient Name:  Wes Morley  :  1955     CHIEF C/O:    Chief Complaint   Patient presents with    Follow-up     SINUS HEADACHES DAILY        HISTORY OBTAINED FROM:  patient    HISTORY OF PRESENT ILLNESS:       Liz Macias is a 77y.o. year old male, here today for follow up of chronic sinusitis and sinus headache. He states that since his last appointment he continues to have headaches daily between his eyes but seem to be worsening. He does have known sphenoid sinus disease from previous CT. He is currently using Flonase and Astelin with good results other than the sinus headaches and pressure. He also states using a device daily which helps his symptoms. He denies any recent fevers or antibiotic usage. He also complains of mild maxillary sinus and frontal sinus pressure. He continues to have persistent rhinorrhea and postnasal drainage. He is at this time interested in proceeding with possible surgical intervention.           Past Medical History:   Diagnosis Date    [facility] obstructive [facility]) [facility]     DVT of lower extremity (deep venous thrombosis) ([facility] 2007    post foot surgery      GERD (gastroesophageal reflux disease)     HIV (human immunodeficiency virus infection) ([facility]     Hyperlipidemia     Hypertension     Prostate enlargement      Past Surgical History:   Procedure Laterality Date    ABDOMEN SURGERY  1970    colon resection d/t paralytic ileus from blunt trauma to abdomen     ANKLE SURGERY Right     COLONOSCOPY      FOOT SURGERY Bilateral     hammertoe    HAMMER TOE SURGERY Left 3/22/2019    CONDYLECTOMY 4TH TOE HAMMERTOE CORRECTION  5TH TOE LEFT FOOT. V TO Y SKIN PLASTY 5TH METATARSAL PHALANGEAL JOINT LEFT FOOT performed by Santhosh Chavez.SUZANNA at [facility]      LYMPHADENECTOMY Left     axilla & Rt side neck    OTHER SURGICAL HISTORY Left Cancer Mother     Heart Disease Father     Stroke Father     Diabetes Father     Lung Cancer Brother        Review of Systems   Constitutional: Negative. Negative for activity change and appetite change. HENT: Positive for congestion, postnasal drip and rhinorrhea. Negative for ear pain. Eyes: Negative. Respiratory: Negative. Negative for shortness of breath and stridor. Cardiovascular: Negative. Negative for chest pain and palpitations. Endocrine: Negative. Musculoskeletal: Negative. Skin: Negative. Neurological: Positive for headaches. Negative for dizziness. Hematological: Negative. Psychiatric/Behavioral: Negative. /82   Ht 6' 6\" (1.981 m)   Wt 292 lb (132.5 kg)   BMI 33.74 kg/m²   Physical Exam  Constitutional:       Appearance: Normal appearance. HENT:      Head: Normocephalic. Right Ear: Tympanic membrane, ear canal and external ear normal.      Left Ear: Tympanic membrane, ear canal and external ear normal.      Nose: No rhinorrhea. Right Turbinates: Swollen. Not pale. Left Turbinates: Swollen. Not pale. Right Sinus: Maxillary sinus tenderness and frontal sinus tenderness present. Left Sinus: Maxillary sinus tenderness and frontal sinus tenderness present. Mouth/Throat:      Lips: Pink. Mouth: Mucous membranes are moist.      Pharynx: Oropharynx is clear. Eyes:      Conjunctiva/sclera: Conjunctivae normal.      Pupils: Pupils are equal, round, and reactive to light. Neck:      Musculoskeletal: Normal range of motion. No neck rigidity or muscular tenderness. Cardiovascular:      Rate and Rhythm: Normal rate and regular rhythm. Pulses: Normal pulses. Pulmonary:      Effort: Pulmonary effort is normal. No respiratory distress. Breath sounds: No stridor. Musculoskeletal: Normal range of motion. Skin:     General: Skin is warm and dry. Neurological:      General: No focal deficit present.       Mental Status: He is alert and oriented to person, place, and time. Psychiatric:         Mood and Affect: Mood normal.         Behavior: Behavior normal.         Thought Content: Thought content normal.         Judgment: Judgment normal.     Nohemi Cantrell was seen today for follow-up. Diagnoses and all orders for this visit:    Chronic sphenoidal sinusitis    DNS (deviated nasal septum)    Hypertrophy of both inferior nasal turbinates        IMPRESSION/PLAN:  Nohemi Cantrell was seen today for follow-up. Diagnoses and all orders for this visit:    Chronic sphenoidal sinusitis    DNS (deviated nasal septum)    Hypertrophy of both inferior nasal turbinates      Brian Ahuja is seen today for follow-up of chronic sinus problems. Upon exam bilateral inferior turbinates are swollen without pale appearance. There is rhinorrhea present with postnasal drainage in the oropharynx. He does exhibit mild tenderness over the frontal and maxillary sinuses. At this time patient is interested in surgical intervention and will follow up with Dr. Toñito Mulligan for possible in office endoscopic sinus surgery. He will at this time continue with his Flonase and Astelin as previously prescribed as well as increasing his nasal saline usage. He will be scheduled in approximately 2 weeks. He is instructed to call with any new or worsening of symptoms prior to his next appointment.       Andrew Flores, MSN, FNP-C  [address] [place] [address], Nose and Throat    The information contained in this note has been dictated using drug and medical speech recognition software and may contain errors

## 2021-03-11 ENCOUNTER — HOSPITAL ENCOUNTER (EMERGENCY)
Age: 66
Discharge: HOME | End: 2021-03-11
Payer: COMMERCIAL

## 2021-03-11 ENCOUNTER — APPOINTMENT (OUTPATIENT)
Dept: ULTRASOUND IMAGING | Age: 66
End: 2021-03-11
Payer: COMMERCIAL

## 2021-03-11 ENCOUNTER — APPOINTMENT (OUTPATIENT)
Dept: GENERAL RADIOLOGY | Age: 66
End: 2021-03-11
Payer: COMMERCIAL

## 2021-03-11 VITALS
OXYGEN SATURATION: 95 % | TEMPERATURE: 97.8 F | BODY MASS INDEX: 34.59 KG/M2 | HEART RATE: 72 BPM | RESPIRATION RATE: 18 BRPM | SYSTOLIC BLOOD PRESSURE: 126 MMHG | DIASTOLIC BLOOD PRESSURE: 82 MMHG | WEIGHT: 299 LBS | HEIGHT: 78 IN

## 2021-03-11 DIAGNOSIS — M10.9 ACUTE GOUT OF RIGHT KNEE, UNSPECIFIED CAUSE: Primary | ICD-10-CM

## 2021-03-11 PROCEDURE — 96372 THER/PROPH/DIAG INJ SC/IM: CPT

## 2021-03-11 PROCEDURE — 93971 EXTREMITY STUDY: CPT

## 2021-03-11 PROCEDURE — 99285 EMERGENCY DEPT VISIT HI MDM: CPT

## 2021-03-11 PROCEDURE — 6360000002 HC RX W HCPCS: Performed by: PHYSICIAN ASSISTANT

## 2021-03-11 PROCEDURE — 73562 X-RAY EXAM OF KNEE 3: CPT

## 2021-03-11 RX ORDER — DEXAMETHASONE SODIUM PHOSPHATE 10 MG/ML
8 INJECTION, SOLUTION INTRAMUSCULAR; INTRAVENOUS ONCE
Status: COMPLETED | OUTPATIENT
Start: 2021-03-11 | End: 2021-03-11

## 2021-03-11 RX ORDER — PREDNISONE 20 MG/1
40 TABLET ORAL DAILY
Qty: 10 TABLET | Refills: 0 | Status: SHIPPED | OUTPATIENT
Start: 2021-03-11 | End: 2021-03-16

## 2021-03-11 RX ADMIN — DEXAMETHASONE SODIUM PHOSPHATE 8 MG: 10 INJECTION, SOLUTION INTRAMUSCULAR; INTRAVENOUS at 13:05

## 2021-03-11 ASSESSMENT — PAIN DESCRIPTION - ORIENTATION: ORIENTATION: RIGHT

## 2021-03-11 ASSESSMENT — PAIN DESCRIPTION - LOCATION: LOCATION: KNEE

## 2021-03-11 ASSESSMENT — PAIN SCALES - GENERAL: PAINLEVEL_OUTOF10: 7

## 2021-03-11 ASSESSMENT — PAIN DESCRIPTION - PAIN TYPE: TYPE: ACUTE PAIN

## 2021-03-11 NOTE — ED PROVIDER NOTES
Independent MLP      HPI:  3/11/21, Time: 12:42 PM RASHEEDA Barlow is a 77 y.o. male presenting to the ED for right knee pain, beginning 0500 this morning. The complaint has been persistent, moderate in severity, and worsened by movement of right knee, standing, walking. Patient does not recall any recent injury. He does relay mild swelling of the right lower leg as well. Does have a history of DVT. Has been compliant with his coumadin and has not missed any doses. No shortness of breath or chest pain. Also has a history of gout in this knee years ago. Afebrile without recent travel or sick contacts. Patient denies all other symptoms at this time. Review of Systems:   A complete review of systems was performed and pertinent positives and negatives are stated within HPI, all other systems reviewed and are negative.          --------------------------------------------- PAST HISTORY ---------------------------------------------  Past Medical History:  has a past medical history of COPD (chronic obstructive pulmonary disease) ([facility] DVT of lower extremity (deep venous thrombosis) ([facility] GERD (gastroesophageal reflux disease), HIV (human immunodeficiency virus infection) ([facility] Hyperlipidemia, Hypertension, and Prostate enlargement. Past Surgical History:  has a past surgical history that includes Abdomen surgery (1970); lymphadenectomy (Left); Colonoscopy; Foot surgery (Bilateral); Ankle surgery (Right); hernia repair; Heel spur surgery; other surgical history (Left, 03/22/2019); and Hammer toe surgery (Left, 3/22/2019). Social History:  reports that he has quit smoking. His smoking use included cigarettes. He quit after 40.00 years of use. He has never used smokeless tobacco. He reports that he does not drink alcohol or use drugs.     Family History: family history includes Breast Cancer in his mother; Colon Cancer in his mother; Diabetes in his father; Heart Disease in his father; Wallene Snellen in his brother; Stroke in his father. The patients home medications have been reviewed. Allergies: Cat hair extract, Seasonal, and Ultram [tramadol hcl]    -------------------------------------------------- RESULTS -------------------------------------------------  All laboratory and radiology results have been personally reviewed by myself   LABS:  No results found for this visit on 03/11/21. RADIOLOGY:  Interpreted by Radiologist.  US DUP LOWER EXTREMITY RIGHT KHUSHBOO   Final Result   1. No evidence of acute DVT. 2. Tiny echogenic eccentric focus in the right common femoral vein likely   represents a calcified clot from prior DVT. XR KNEE RIGHT (3 VIEWS)   Final Result   Small suprapatellar joint effusion. Otherwise, unremarkable right knee   radiographs.             ------------------------- NURSING NOTES AND VITALS REVIEWED ---------------------------   The nursing notes within the ED encounter and vital signs as below have been reviewed. /82   Pulse 72   Temp 97.8 °F (36.6 °C) (Oral)   Resp 18   Ht 6' 6\" (1.981 m)   Wt 299 lb (135.6 kg)   SpO2 95%   BMI 34.55 kg/m²   Oxygen Saturation Interpretation: Normal      ---------------------------------------------------PHYSICAL EXAM--------------------------------------      Constitutional/General: Alert and oriented x3, well appearing, non toxic in NAD  Head: Normocephalic and atraumatic  Eyes: PERRL, EOMI  Mouth: Oropharynx clear, handling secretions, no trismus  Neck: Supple, full ROM,   Pulmonary: Lungs clear to auscultation bilaterally, no wheezes, rales, or rhonchi. Not in respiratory distress  Cardiovascular:  Regular rate and rhythm, no murmurs, gallops, or rubs. 2+ distal pulses  Abdomen: Soft, non tender, non distended,   Extremities: Moves all extremities x 4.  Warm and well perfused  Skin: warm and dry without rash  Neurologic: GCS 15,  Psych: Normal Affect      ------------------------------ ED COURSE/MEDICAL DECISION MAKING----------------------  Medications   dexamethasone (PF) (DECADRON) injection 8 mg (8 mg Intramuscular Given 3/11/21 1305)     Medical Decision Making:     ED COURSE:  ED Course as of Mar 11 1433   Thu Mar 11, 2021   1352 Reassessed patient. Remains stable and neurovascularly intact. Discussed imaging studies with the patient. No evidence of acute DVT. X-rays do not reveal any acute pathology. He does have a history of gout with similar symptoms in the past.  At this time we will initiate treatment with steroids for his gout. We will avoid NSAIDs as he has a history of chronic kidney disease. We will also provide crutches to the patient as he is having difficulty ambulating secondary to knee pain. Advised to follow-up very closely with PCP for recheck return to the emergency department any new or worsening symptoms. Patient voiced understanding and is agreeable to the above treatment plan. [MS]      ED Course User Index  [MS] Christal Nelson         Counseling: The emergency provider has spoken with the patient and discussed todays results, in addition to providing specific details for the plan of care and counseling regarding the diagnosis and prognosis. Questions are answered at this time and they are agreeable with the plan.      --------------------------------- IMPRESSION AND DISPOSITION ---------------------------------    IMPRESSION  1. Acute gout of right knee, unspecified cause        DISPOSITION  Disposition: Discharge to home  Patient condition is stable      NOTE: This report was transcribed using voice recognition software.  Every effort was made to ensure accuracy; however, inadvertent computerized transcription errors may be present        Christal Nelson  03/11/21 [id number]

## 2021-03-17 ENCOUNTER — OFFICE VISIT (OUTPATIENT)
Dept: ENT CLINIC | Age: 66
End: 2021-03-17
Payer: OTHER GOVERNMENT

## 2021-03-17 VITALS — HEIGHT: 78 IN | BODY MASS INDEX: 34.02 KG/M2 | WEIGHT: 294 LBS

## 2021-03-17 DIAGNOSIS — J32.3 CHRONIC SPHENOIDAL SINUSITIS: Primary | ICD-10-CM

## 2021-03-17 DIAGNOSIS — J34.3 HYPERTROPHY OF NASAL TURBINATES: ICD-10-CM

## 2021-03-17 PROCEDURE — 1036F TOBACCO NON-USER: CPT | Performed by: OTOLARYNGOLOGY

## 2021-03-17 PROCEDURE — 1123F ACP DISCUSS/DSCN MKR DOCD: CPT | Performed by: OTOLARYNGOLOGY

## 2021-03-17 PROCEDURE — 31231 NASAL ENDOSCOPY DX: CPT | Performed by: OTOLARYNGOLOGY

## 2021-03-17 PROCEDURE — 99214 OFFICE O/P EST MOD 30 MIN: CPT | Performed by: OTOLARYNGOLOGY

## 2021-03-17 PROCEDURE — 4040F PNEUMOC VAC/ADMIN/RCVD: CPT | Performed by: OTOLARYNGOLOGY

## 2021-03-17 PROCEDURE — G8417 CALC BMI ABV UP PARAM F/U: HCPCS | Performed by: OTOLARYNGOLOGY

## 2021-03-17 PROCEDURE — G8484 FLU IMMUNIZE NO ADMIN: HCPCS | Performed by: OTOLARYNGOLOGY

## 2021-03-17 PROCEDURE — 3017F COLORECTAL CA SCREEN DOC REV: CPT | Performed by: OTOLARYNGOLOGY

## 2021-03-17 PROCEDURE — G8427 DOCREV CUR MEDS BY ELIG CLIN: HCPCS | Performed by: OTOLARYNGOLOGY

## 2021-03-17 NOTE — PROGRESS NOTES
[facility] Otolaryngology  Dr. Mark Carranza. Dharmesh Austin. Ms.Ed        Patient Name:  Kvng Clarity  :  1955     CHIEF C/O:    Chief Complaint   Patient presents with    Other     discuss fess        HISTORY OBTAINED FROM:  patient    HISTORY OF PRESENT ILLNESS:       Chica Ovalle is a 77y.o. year old male, here today for follow up of prior history of evaluation by the otolaryngology nurse practitioner for chronic sinus disease and increasing levels of headache headaches. Found to have ischemia sphenoid disease here for possible evaluation of in office balloon sinuplasty. Patient seen and examined, history is reviewed, including history of pituitary adenoma surgery that occurred at the [facility] approximately 2 years ago, he states he did well after that surgery, but recently in the last 6 months is developed increasing levels of headaches, chronic nasal congestion and thick mucoid drainage. No prior history of recent antibiotic therapy, no prior history of current fever chills headaches or vision changes. No current complaints of sore throat, difficulty swallowing. Patient does have a history of HIV positive immune compensation, denies any other associate epistaxis hoarseness shortness of breath stridor difficulty swallowing. CT scan of the sinuses reviewed, overread will be required, due to the fact that there is a significant amount of clival and pituitary changes that are not mentioned with in the CT scan itself other than the mild to moderate sphenoid disease. These results reviewed again with the patient and read by myself today.       Past Medical History:   Diagnosis Date    COPD (chronic obstructive pulmonary disease) ([address]     DVT of lower extremity (deep venous thrombosis) ([address] 2007    post foot surgery      GERD (gastroesophageal reflux disease)     HIV (human immunodeficiency virus infection) ([address]     Hyperlipidemia     Hypertension     Prostate enlargement      Past Surgical History:   Procedure Laterality Date    ABDOMEN SURGERY  1970    colon resection d/t paralytic ileus from blunt trauma to abdomen     ANKLE SURGERY Right     COLONOSCOPY      FOOT SURGERY Bilateral     hammertoe    HAMMER TOE SURGERY Left 3/22/2019    CONDYLECTOMY 4TH TOE HAMMERTOE CORRECTION  5TH TOE LEFT FOOT. V TO Y SKIN PLASTY 5TH METATARSAL PHALANGEAL JOINT LEFT FOOT performed by Chiki Lea, DPM at [address]      LYMPHADENECTOMY Left     axilla & Rt side neck    OTHER SURGICAL HISTORY Left 03/22/2019    hammertoe correction 4th and 5th toes left foot, skin plasty 5th MPJ left foot       Current Outpatient Medications:     amoxicillin (AMOXIL) 500 MG capsule, Take 1 capsule by mouth 3 times daily for 21 days, Disp: 63 capsule, Rfl: 0    Hypertonic Nasal Wash (SINUS RINSE) PACK, 1 kit by Nasal route daily, Disp: 1 kit, Rfl: 1    azelastine (ASTELIN) 0.1 % nasal spray, 2 sprays by Nasal route 2 times daily Use in each nostril as directed, Disp: 3 Bottle, Rfl: 2    Diphenhydramine-Pseudoephed (BENADRYL ALLERGY/SINUS PO), Take 10 mg by mouth 3 times daily, Disp: , Rfl:     hydroCHLOROthiazide (HYDRODIURIL) 25 MG tablet, Take 25 mg by mouth daily, Disp: , Rfl:     lisinopril (PRINIVIL;ZESTRIL) 20 MG tablet, Take 20 mg by mouth daily, Disp: , Rfl:     sodium chloride (OCEAN, BABY AYR) 0.65 % nasal spray, 2 sprays by Nasal route as needed for Congestion (5 times daily), Disp: , Rfl:     fluticasone (FLONASE) 50 MCG/ACT nasal spray, 2 sprays by Each Nostril route daily, Disp: , Rfl:     tiotropium (SPIRIVA RESPIMAT) 1.25 MCG/ACT AERS inhaler, Inhale 2 puffs into the lungs daily, Disp: , Rfl:     omeprazole (PRILOSEC) 20 MG delayed release capsule, Take 20 mg by mouth daily, Disp: , Rfl:     Abacavir-Dolutegravir-Lamivud (TRIUMEQ) 600- MG TABS, Take 1 tablet by mouth nightly, Disp: , Rfl:     rosuvastatin (CRESTOR) 40 MG tablet, Take 40 mg by mouth every evening, Disp: , Rfl:     warfarin (COUMADIN) 5 MG tablet, Take 5 mg by mouth daily Takes only 2.5mg Tuesday and Thursday, sat and sun Stopped 5 days pre op Put on lovenox bridge by physician at [facility], Disp: , Rfl:     montelukast (SINGULAIR) 10 MG tablet, Take 1 tablet by mouth daily, Disp: 30 tablet, Rfl: 3    Sodium Chloride-Sodium Bicarb 1.57 g PACK, 1 Package by Nasal route daily, Disp: 30 each, Rfl: 2  Cat hair extract, Seasonal, and Ultram [tramadol hcl]  Social History     Tobacco Use    Smoking status: Former Smoker     Years: 40.00     Types: Cigarettes    Smokeless tobacco: Never Used   Substance Use Topics    Alcohol use: No    Drug use: No     Family History   Problem Relation Age of Onset    Breast Cancer Mother     Colon Cancer Mother     Heart Disease Father     Stroke Father     Diabetes Father     Lung Cancer Brother        Review of Systems   Constitutional: Negative for chills and fever. HENT: Positive for congestion, postnasal drip and rhinorrhea. Negative for ear discharge, hearing loss, sinus pressure, sneezing, sore throat and tinnitus. Respiratory: Negative for cough and shortness of breath. Cardiovascular: Negative for chest pain and palpitations. Gastrointestinal: Negative for vomiting. Skin: Negative for rash. Allergic/Immunologic: Negative for environmental allergies. Neurological: Negative for dizziness and headaches. Hematological: Does not bruise/bleed easily. All other systems reviewed and are negative. Ht 6' 6\" (1.981 m)   Wt 294 lb (133.4 kg)   BMI 33.98 kg/m²   Physical Exam  Vitals signs and nursing note reviewed. Constitutional:       Appearance: He is well-developed. HENT:      Right Ear: Tympanic membrane and ear canal normal.      Left Ear: Tympanic membrane and ear canal normal.      Nose: Congestion and rhinorrhea present.       Mouth/Throat:      Mouth: Mucous membranes are moist.      Pharynx: No oropharyngeal exudate or posterior oropharyngeal erythema. Eyes:      Pupils: Pupils are equal, round, and reactive to light. Neck:      Musculoskeletal: Normal range of motion. Thyroid: No thyromegaly. Trachea: No tracheal deviation. Cardiovascular:      Rate and Rhythm: Normal rate. Pulmonary:      Effort: Pulmonary effort is normal. No respiratory distress. Musculoskeletal: Normal range of motion. Lymphadenopathy:      Cervical: No cervical adenopathy. Skin:     General: Skin is warm. Findings: No erythema. Neurological:      Mental Status: He is alert. Cranial Nerves: No cranial nerve deficit. 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 Complications: none    Blood Loss: Min. Disposition: home      IMPRESSION/PLAN:  Patient seen and examined for known history of chronic sphenoid sinusitis on CT scan over read by myself revealing pituitary surgical changes, as well as chronic sinus disease underwent fiberoptic nasopharyngoscopy in the office today revealing sphenoid mucosal thickening with nasal crusting to be placed on amoxicillin for 21-day course for history of decompensation with chronic sinus disease, increase saline irrigations and follow-up in 4 to 6 weeks with progress.   At this point I do not feel that an office balloon sinuplasty is appropriate for this patient due to his previous surgical interventions, we will continue to follow as needed. Dr. Rola Herrera.  Otolaryngology Facial Plastic Surgery  :  [facility] Otolaryngology/Facial Plastic Surgery Residency  Associate Clinical Professor:  Favio Womack [facility]

## 2021-03-25 ASSESSMENT — ENCOUNTER SYMPTOMS
STRIDOR: 0
EYES NEGATIVE: 1
RESPIRATORY NEGATIVE: 1
SHORTNESS OF BREATH: 0

## 2021-04-07 ENCOUNTER — OFFICE VISIT (OUTPATIENT)
Dept: ENT CLINIC | Age: 66
End: 2021-04-07
Payer: COMMERCIAL

## 2021-04-07 VITALS
BODY MASS INDEX: 33.66 KG/M2 | DIASTOLIC BLOOD PRESSURE: 85 MMHG | WEIGHT: 290.9 LBS | HEART RATE: 81 BPM | SYSTOLIC BLOOD PRESSURE: 134 MMHG | HEIGHT: 78 IN

## 2021-04-07 DIAGNOSIS — J32.3 CHRONIC SPHENOIDAL SINUSITIS: Primary | ICD-10-CM

## 2021-04-07 PROCEDURE — G8427 DOCREV CUR MEDS BY ELIG CLIN: HCPCS | Performed by: OTOLARYNGOLOGY

## 2021-04-07 PROCEDURE — 31231 NASAL ENDOSCOPY DX: CPT | Performed by: OTOLARYNGOLOGY

## 2021-04-07 PROCEDURE — G8417 CALC BMI ABV UP PARAM F/U: HCPCS | Performed by: OTOLARYNGOLOGY

## 2021-04-07 PROCEDURE — 1123F ACP DISCUSS/DSCN MKR DOCD: CPT | Performed by: OTOLARYNGOLOGY

## 2021-04-07 PROCEDURE — 1036F TOBACCO NON-USER: CPT | Performed by: OTOLARYNGOLOGY

## 2021-04-07 PROCEDURE — 3017F COLORECTAL CA SCREEN DOC REV: CPT | Performed by: OTOLARYNGOLOGY

## 2021-04-07 PROCEDURE — 4040F PNEUMOC VAC/ADMIN/RCVD: CPT | Performed by: OTOLARYNGOLOGY

## 2021-04-07 PROCEDURE — 99213 OFFICE O/P EST LOW 20 MIN: CPT | Performed by: OTOLARYNGOLOGY

## 2021-04-07 RX ORDER — MONTELUKAST SODIUM 10 MG/1
10 TABLET ORAL DAILY
Qty: 30 TABLET | Refills: 3 | Status: SHIPPED
Start: 2021-04-07 | End: 2021-12-21 | Stop reason: SDUPTHER

## 2021-04-07 NOTE — PROGRESS NOTES
[facility] Otolaryngology  Dr. Isak Canela. Maninder Chávez. Ms.Ed        Patient Name:  Mitzi Poag  :  1955     CHIEF C/O:    Chief Complaint   Patient presents with    Follow-up     pt complains of blockage, between eyes and nose       HISTORY OBTAINED FROM:  patient    HISTORY OF PRESENT ILLNESS:       Angel Trevino is a 77y.o. year old male, here today for follow up of recurrent headaches and associated left-sided sphenoid sinusitis status post transsphenoidal pituitary adenoma resection. Patient was placed on Bactroban nasal saline rinses, and continued on his other nasal sprays and has significantly improved but he still continues to complain of headaches centered around the vertex of his head. No complaints of vision changes no complaints of nausea vomit shortness of breath stridor difficulty swallowing at this time.       Past Medical History:   Diagnosis Date    COPD (chronic obstructive pulmonary disease) ([address]     [facility]) [facility]     post foot surgery      GERD (gastroesophageal reflux disease)     HIV [facility]human immunodeficiency virus infection) [facility]     Hyperlipidemia     Hypertension     Prostate enlargement      Past Surgical History:   Procedure Laterality Date    ABDOMEN SURGERY  1970    colon resection d/t paralytic ileus from blunt trauma to abdomen     ANKLE SURGERY Right     COLONOSCOPY      FOOT SURGERY Bilateral     hammertoe    HAMMER TOE SURGERY Left 3/22/2019    CONDYLECTOMY 4TH TOE HAMMERTOE CORRECTION  5TH TOE LEFT FOOT. V TO Y SKIN PLASTY 5TH METATARSAL PHALANGEAL JOINT LEFT FOOT performed by Claudia Martinez DPM [address]      LYMPHADENECTOMY Left     axilla & Rt side neck    OTHER SURGICAL HISTORY Left 2019    hammertoe correction 4th and 5th toes left foot, skin plasty 5th MPJ left foot       Current Outpatient Medications:     montelukast (SINGULAIR) 10 MG tablet, Take 1 tablet by mouth daily, Disp: 30 tablet, Rfl: 3    amoxicillin (AMOXIL) 500 MG capsule, Take 1 capsule by mouth 3 times daily for 21 days, Disp: 63 capsule, Rfl: 0    Hypertonic Nasal Wash (SINUS RINSE) PACK, 1 kit by Nasal route daily, Disp: 1 kit, Rfl: 1    Sodium Chloride-Sodium Bicarb 1.57 g PACK, 1 Package by Nasal route daily, Disp: 30 each, Rfl: 2    azelastine (ASTELIN) 0.1 % nasal spray, 2 sprays by Nasal route 2 times daily Use in each nostril as directed, Disp: 3 Bottle, Rfl: 2    Diphenhydramine-Pseudoephed (BENADRYL ALLERGY/SINUS PO), Take 10 mg by mouth 3 times daily, Disp: , Rfl:     hydroCHLOROthiazide (HYDRODIURIL) 25 MG tablet, Take 25 mg by mouth daily, Disp: , Rfl:     lisinopril (PRINIVIL;ZESTRIL) 20 MG tablet, Take 20 mg by mouth daily, Disp: , Rfl:     sodium chloride (OCEAN, BABY AYR) 0.65 % nasal spray, 2 sprays by Nasal route as needed for Congestion (5 times daily), Disp: , Rfl:     fluticasone (FLONASE) 50 MCG/ACT nasal spray, 2 sprays by Each Nostril route daily, Disp: , Rfl:     tiotropium (SPIRIVA RESPIMAT) 1.25 MCG/ACT AERS inhaler, Inhale 2 puffs into the lungs daily, Disp: , Rfl:     omeprazole (PRILOSEC) 20 MG delayed release capsule, Take 20 mg by mouth daily, Disp: , Rfl:     Abacavir-Dolutegravir-Lamivud (TRIUMEQ) 600- MG TABS, Take 1 tablet by mouth nightly, Disp: , Rfl:     rosuvastatin (CRESTOR) 40 MG tablet, Take 40 mg by mouth every evening, Disp: , Rfl:     warfarin (COUMADIN) 5 MG tablet, Take 5 mg by mouth daily Takes only 2.5mg Tuesday and Thursday, sat and sun Stopped 5 days pre op Put on lovenox bridge by physician at [facility], Disp: , Rfl:   Cat hair extract, Seasonal, and Ultram [tramadol hcl]  Social History     Tobacco Use    Smoking status: Former Smoker     Years: 40.00     Types: Cigarettes    Smokeless tobacco: Never Used   Substance Use Topics    Alcohol use: No    Drug use: No     Family History   Problem Relation Age of Onset    Breast Cancer Mother     Colon Cancer Mother     Heart Disease Father     Stroke Father     Diabetes Father     Lung Cancer Brother        Review of Systems   Constitutional: Negative for chills and fever. HENT: Positive for congestion. Negative for ear discharge, hearing loss, rhinorrhea, sinus pain and sneezing. Respiratory: Negative for cough and shortness of breath. Cardiovascular: Negative for chest pain and palpitations. Gastrointestinal: Negative for vomiting. Skin: Negative for rash. Allergic/Immunologic: Negative for environmental allergies. Neurological: Positive for headaches. Negative for dizziness, seizures, speech difficulty and numbness. Hematological: Does not bruise/bleed easily. All other systems reviewed and are negative. /85 (Site: Right Upper Arm, Position: Sitting, Cuff Size: Large Adult)   Pulse 81   Ht 6' 6\" (1.981 m)   Wt 290 lb 14.4 oz (132 kg)   BMI 33.62 kg/m²   Physical Exam  Vitals signs and nursing note reviewed. Constitutional:       Appearance: He is well-developed. HENT:      Head: Normocephalic and atraumatic. Right Ear: Tympanic membrane and ear canal normal.      Left Ear: Tympanic membrane and ear canal normal.      Nose: Congestion present. No rhinorrhea. Right Turbinates: Not enlarged or swollen. Left Turbinates: Not enlarged or swollen. Mouth/Throat:      Mouth: Mucous membranes are moist.      Pharynx: Oropharynx is clear. Eyes:      Pupils: Pupils are equal, round, and reactive to light. Neck:      Musculoskeletal: Normal range of motion. Thyroid: No thyromegaly. Trachea: No tracheal deviation. Cardiovascular:      Rate and Rhythm: Normal rate. Pulmonary:      Effort: Pulmonary effort is normal. No respiratory distress. Musculoskeletal: Normal range of motion. Lymphadenopathy:      Cervical: No cervical adenopathy. Skin:     General: Skin is warm. Findings: No erythema. Neurological:      Mental Status: He is alert. Cranial Nerves: No cranial nerve deficit. Procedure Note    Pre-operative Diagnosis: Transsphenoidal pituitary, sphenoid sinusitis on CT scan    Post-operative Diagnosis: same    Anesthesia: Lidocaine 2% and Chang-Synephrine 1/2%    Endoscopy Type:  Nasal Endoscopy    Procedure Details:    After topical anesthesia and decongestion, the patient was placed in the sitting position. The zero degree telescope was passed along the left nasal floor to the nasopharynx. It was then passed into the region of the middle meatus, middle turbinate, and the sphenoethmoid region. An identical procedure was performed on the right side. The following findings were noted as stated below:    Findings: Nasal airway was decongested with tube 5% Afrin as well as 4% lidocaine, once adequate anesthesia was achieved, a 0 degree endoscope was passed to the naris showing intraoperative changes, to the middle turbinates and nasal septum with wide patent left sphenoidotomy, showing significant improvement overall erythema and crusting of the sphenoid sinus. Condition:  Stable. Patient tolerated procedure well. Complications:  None    IMPRESSION/PLAN:  Patient seen and examined, recommendations are for the patient to do plain saline rinses twice daily, may use Astelin nasal spray for decongestion and follow-up with neurosurgery for persistent headaches. Nasal endoscopy today shows significant overall improvement of sphenoid crusting, and mucoid drainage mucosa appears to be pink, no sign of inflammatory or infectious changes. Dr. Moni Mcclendon D.O., Ms. Carolina Jael.  Otolaryngology Facial Plastic Surgery  :  [address] [place] [address] Otolaryngology/Facial Plastic Surgery Residency  Associate Clinical Professor:  DANG Reyes  [facility]

## 2021-04-08 RX ORDER — AMOXICILLIN 500 MG/1
500 CAPSULE ORAL 3 TIMES DAILY
Qty: 63 CAPSULE | Refills: 0 | Status: SHIPPED | OUTPATIENT
Start: 2021-04-08 | End: 2021-04-29

## 2021-04-08 ASSESSMENT — ENCOUNTER SYMPTOMS
SORE THROAT: 0
SINUS PRESSURE: 0
VOMITING: 0
COUGH: 0
RHINORRHEA: 1
SHORTNESS OF BREATH: 0

## 2021-04-24 ASSESSMENT — ENCOUNTER SYMPTOMS
SINUS PAIN: 0
VOMITING: 0
COUGH: 0
SHORTNESS OF BREATH: 0
RHINORRHEA: 0

## 2021-05-19 ENCOUNTER — OFFICE VISIT (OUTPATIENT)
Dept: ENT CLINIC | Age: 66
End: 2021-05-19
Payer: COMMERCIAL

## 2021-05-19 VITALS
DIASTOLIC BLOOD PRESSURE: 74 MMHG | SYSTOLIC BLOOD PRESSURE: 124 MMHG | HEART RATE: 78 BPM | BODY MASS INDEX: 33.44 KG/M2 | WEIGHT: 289 LBS | HEIGHT: 78 IN

## 2021-05-19 DIAGNOSIS — J32.3 CHRONIC SPHENOIDAL SINUSITIS: Primary | ICD-10-CM

## 2021-05-19 PROCEDURE — 99214 OFFICE O/P EST MOD 30 MIN: CPT | Performed by: OTOLARYNGOLOGY

## 2021-05-19 PROCEDURE — 4040F PNEUMOC VAC/ADMIN/RCVD: CPT | Performed by: OTOLARYNGOLOGY

## 2021-05-19 PROCEDURE — G8417 CALC BMI ABV UP PARAM F/U: HCPCS | Performed by: OTOLARYNGOLOGY

## 2021-05-19 PROCEDURE — 1036F TOBACCO NON-USER: CPT | Performed by: OTOLARYNGOLOGY

## 2021-05-19 PROCEDURE — 3017F COLORECTAL CA SCREEN DOC REV: CPT | Performed by: OTOLARYNGOLOGY

## 2021-05-19 PROCEDURE — G8427 DOCREV CUR MEDS BY ELIG CLIN: HCPCS | Performed by: OTOLARYNGOLOGY

## 2021-05-19 PROCEDURE — 1123F ACP DISCUSS/DSCN MKR DOCD: CPT | Performed by: OTOLARYNGOLOGY

## 2021-05-19 RX ORDER — FEXOFENADINE HCL 180 MG/1
180 TABLET ORAL DAILY
Qty: 30 TABLET | Refills: 0 | Status: SHIPPED | OUTPATIENT
Start: 2021-05-19 | End: 2021-06-18

## 2021-05-19 NOTE — PROGRESS NOTES
[id number] [facility] Otolaryngology  Dr. Walter Mercado. Rothbury Gay. Ms.Ed        Patient Name:  Kinsey Barbosa  :  1955     CHIEF C/O:    Chief Complaint   Patient presents with    Follow-up     6wk f/u throat       HISTORY OBTAINED FROM:  patient    HISTORY OF PRESENT ILLNESS:       Kayden Portillo is a 77y.o. year old male, here today for follow up of chronic headache complaints with associated postnasal drainage and congestion. Patient been placed on appropriate interventional medical therapy to include Allegra, singular, as well as Astelin 2 sprays each nostril twice daily with saline rinses. He was scoped previously showing some mucoid drainage from the sphenoid sinuses CT scan is consistent with chronic sphenoid sinusitis however the patient has had a history of a transsphenoidal apophysis ectomy up at [facility] and has not been seen by his neurosurgeon. Patient denies any current complaints of vertigo, no complaints of clear rhinorrhea no history of fever or chills. No complaints of vision changes. No other complaints today of tinnitus vertigo or hearing loss.       Past Medical History:   Diagnosis Date    [facility]     DVT of lower extremity (deep venous thrombosis) ([facility]     post foot surgery      GERD (gastroesophageal reflux disease)     HIV (human immunodeficiency virus infection) ([facility]     Hyperlipidemia     Hypertension     Prostate enlargement      Past Surgical History:   Procedure Laterality Date    ABDOMEN SURGERY      colon resection d/t paralytic ileus from blunt trauma to abdomen     ANKLE SURGERY Right     COLONOSCOPY      FOOT SURGERY Bilateral     hammertoe    HAMMER TOE SURGERY Left 3/22/2019    CONDYLECTOMY 4TH TOE HAMMERTOE CORRECTION  5TH TOE LEFT FOOT. V TO Y SKIN PLASTY 5TH METATARSAL PHALANGEAL JOINT LEFT FOOT performed by Mike Sloan DPM at [address] LYMPHADENECTOMY Left     axilla & Rt side neck    OTHER SURGICAL HISTORY Left 03/22/2019    hammertoe correction 4th and 5th toes left foot, skin plasty 5th MPJ left foot       Current Outpatient Medications:     fexofenadine (ALLEGRA) 180 MG tablet, Take 1 tablet by mouth daily, Disp: 30 tablet, Rfl: 0    montelukast (SINGULAIR) 10 MG tablet, Take 1 tablet by mouth daily, Disp: 30 tablet, Rfl: 3    Hypertonic Nasal Wash (SINUS RINSE) PACK, 1 kit by Nasal route daily, Disp: 1 kit, Rfl: 1    Sodium Chloride-Sodium Bicarb 1.57 g PACK, 1 Package by Nasal route daily, Disp: 30 each, Rfl: 2    azelastine (ASTELIN) 0.1 % nasal spray, 2 sprays by Nasal route 2 times daily Use in each nostril as directed, Disp: 3 Bottle, Rfl: 2    Diphenhydramine-Pseudoephed (BENADRYL ALLERGY/SINUS PO), Take 10 mg by mouth 3 times daily, Disp: , Rfl:     hydroCHLOROthiazide (HYDRODIURIL) 25 MG tablet, Take 25 mg by mouth daily, Disp: , Rfl:     lisinopril (PRINIVIL;ZESTRIL) 20 MG tablet, Take 20 mg by mouth daily, Disp: , Rfl:     sodium chloride (OCEAN, BABY AYR) 0.65 % nasal spray, 2 sprays by Nasal route as needed for Congestion (5 times daily), Disp: , Rfl:     fluticasone (FLONASE) 50 MCG/ACT nasal spray, 2 sprays by Each Nostril route daily, Disp: , Rfl:     tiotropium (SPIRIVA RESPIMAT) 1.25 MCG/ACT AERS inhaler, Inhale 2 puffs into the lungs daily, Disp: , Rfl:     omeprazole (PRILOSEC) 20 MG delayed release capsule, Take 20 mg by mouth daily, Disp: , Rfl:     Abacavir-Dolutegravir-Lamivud (TRIUMEQ) 600- MG TABS, Take 1 tablet by mouth nightly, Disp: , Rfl:     rosuvastatin (CRESTOR) 40 MG tablet, Take 40 mg by mouth every evening, Disp: , Rfl:     warfarin (COUMADIN) 5 MG tablet, Take 5 mg by mouth daily Takes only 2.5mg Tuesday and Thursday, sat and sun Stopped 5 days pre op Put on lovenox bridge by physician at [facility], Disp: , Rfl:   Cat hair extract, Seasonal, and Ultram [tramadol hcl]  Social History Lymphadenopathy:      Cervical: No cervical adenopathy. Skin:     General: Skin is warm. Findings: No erythema. Neurological:      Mental Status: He is alert. Cranial Nerves: No cranial nerve deficit. IMPRESSION/PLAN:  Patient seen and examined for history of chronic allergic rhinitis and chronic sphenoid sinus sinusitis with improvement on visualization today however continues to have frequent headaches, recommendations are for the patient to be seen by his neurosurgeon who had originally performed the surgery for reevaluation to ensure there is no neurosurgical or postoperative cause for his current complaints and follow-up with ENT in 3 to 4 months. Dr. Lucrecia Perea.  Otolaryngology Facial Plastic Surgery  :  [facility] Otolaryngology/Facial Plastic Surgery Residency  Associate Clinical Professor:  DECLAN [facility]

## 2021-06-16 ASSESSMENT — ENCOUNTER SYMPTOMS
SINUS PAIN: 1
SHORTNESS OF BREATH: 0
COUGH: 0
SINUS PRESSURE: 1
RHINORRHEA: 1
VOMITING: 0

## 2021-06-30 ENCOUNTER — OFFICE VISIT (OUTPATIENT)
Dept: ENT CLINIC | Age: 66
End: 2021-06-30
Payer: COMMERCIAL

## 2021-06-30 VITALS
WEIGHT: 292.8 LBS | BODY MASS INDEX: 33.88 KG/M2 | SYSTOLIC BLOOD PRESSURE: 111 MMHG | HEIGHT: 78 IN | HEART RATE: 93 BPM | DIASTOLIC BLOOD PRESSURE: 78 MMHG

## 2021-06-30 DIAGNOSIS — J01.31 ACUTE RECURRENT SPHENOIDAL SINUSITIS: Primary | ICD-10-CM

## 2021-06-30 PROCEDURE — 1036F TOBACCO NON-USER: CPT | Performed by: OTOLARYNGOLOGY

## 2021-06-30 PROCEDURE — 4040F PNEUMOC VAC/ADMIN/RCVD: CPT | Performed by: OTOLARYNGOLOGY

## 2021-06-30 PROCEDURE — G8417 CALC BMI ABV UP PARAM F/U: HCPCS | Performed by: OTOLARYNGOLOGY

## 2021-06-30 PROCEDURE — 3017F COLORECTAL CA SCREEN DOC REV: CPT | Performed by: OTOLARYNGOLOGY

## 2021-06-30 PROCEDURE — 1123F ACP DISCUSS/DSCN MKR DOCD: CPT | Performed by: OTOLARYNGOLOGY

## 2021-06-30 PROCEDURE — G8427 DOCREV CUR MEDS BY ELIG CLIN: HCPCS | Performed by: OTOLARYNGOLOGY

## 2021-06-30 PROCEDURE — 99214 OFFICE O/P EST MOD 30 MIN: CPT | Performed by: OTOLARYNGOLOGY

## 2021-06-30 RX ORDER — MONTELUKAST SODIUM 10 MG/1
10 TABLET ORAL DAILY
Qty: 30 TABLET | Refills: 3 | Status: SHIPPED
Start: 2021-06-30 | End: 2021-11-05

## 2021-07-01 ENCOUNTER — TELEPHONE (OUTPATIENT)
Dept: ENT CLINIC | Age: 66
End: 2021-07-01

## 2021-07-01 DIAGNOSIS — Z01.812 PRE-PROCEDURAL LABORATORY EXAMINATIONS: Primary | ICD-10-CM

## 2021-07-01 NOTE — TELEPHONE ENCOUNTER
Please close office note so that prior authorization can be started.   Patient scheduled for July 14, 2021

## 2021-07-06 DIAGNOSIS — Z01.812 PRE-PROCEDURAL LABORATORY EXAMINATIONS: ICD-10-CM

## 2021-07-06 LAB
BUN BLDV-MCNC: 19 MG/DL (ref 6–23)
CREAT SERPL-MCNC: 1.2 MG/DL (ref 0.7–1.2)
GFR AFRICAN AMERICAN: >60
GFR NON-AFRICAN AMERICAN: >60 ML/MIN/1.73

## 2021-07-07 ENCOUNTER — TELEPHONE (OUTPATIENT)
Dept: ENT CLINIC | Age: 66
End: 2021-07-07

## 2021-07-12 ASSESSMENT — ENCOUNTER SYMPTOMS
TROUBLE SWALLOWING: 1
WHEEZING: 0
SORE THROAT: 1
COUGH: 1
EYE PAIN: 1
VOMITING: 0
SHORTNESS OF BREATH: 0

## 2021-07-12 NOTE — PROGRESS NOTES
[facility] Otolaryngology  Dr. Rodriguez [place]. Valerio Jimenes. Ms.Ed        Patient Name:  Brandon Moore  :  1955     CHIEF C/O:    Chief Complaint   Patient presents with    Pharyngitis     doing better       HISTORY OBTAINED FROM:  patient    HISTORY OF PRESENT ILLNESS:       Keanu Gan is a 77y.o. year old male, here today for follow up of patient with a known history of immunocompromise state status post transnasal pituitary gland resection with associated intermittent episodes of recurrent sphenoid sinusitis, postnasal drainage cough and sore throat. Referred patient back to his neurosurgeon, who deferred any current further evaluation and the patient continues to have vision changes, facial pressure and headaches, without any associated obvious signs of CSF leak. Patient also complains of frequent postnasal drainage, intermittent episodes of nonproductive cough. No current complaints of new tinnitus vertigo or hearing loss.       Past Medical History:   Diagnosis Date    [facility]) [facility]     DVT of lower extremity (deep venous thrombosis) ([facility] 2007    post foot surgery      GERD (gastroesophageal reflux disease)     HIV (human immunodeficiency virus infection) ([facility]     Hyperlipidemia     Hypertension     Prostate enlargement      Past Surgical History:   Procedure Laterality Date    ABDOMEN SURGERY  1970    colon resection d/t paralytic ileus from blunt trauma to abdomen     ANKLE SURGERY Right     COLONOSCOPY      FOOT SURGERY Bilateral     hammertoe    HAMMER TOE SURGERY Left 3/22/2019    CONDYLECTOMY 4TH TOE HAMMERTOE CORRECTION  5TH TOE LEFT FOOT. V TO Y SKIN PLASTY 5TH METATARSAL PHALANGEAL JOINT LEFT FOOT performed by Jelani Cole DPM [address]      LYMPHADENECTOMY Left     axilla & Rt side neck    OTHER SURGICAL HISTORY Left 2019    hammertoe correction 4th and 5th toes left foot, skin plasty 5th MPJ left foot       Current Outpatient Medications:     Aromatic Inhalants (VICKS VAPOR IN), Inhale into the lungs In a machine to help him breath, Disp: , Rfl:     montelukast (SINGULAIR) 10 MG tablet, Take 1 tablet by mouth daily, Disp: 30 tablet, Rfl: 3    montelukast (SINGULAIR) 10 MG tablet, Take 1 tablet by mouth daily, Disp: 30 tablet, Rfl: 3    Hypertonic Nasal Wash (SINUS RINSE) PACK, 1 kit by Nasal route daily, Disp: 1 kit, Rfl: 1    azelastine (ASTELIN) 0.1 % nasal spray, 2 sprays by Nasal route 2 times daily Use in each nostril as directed, Disp: 3 Bottle, Rfl: 2    Diphenhydramine-Pseudoephed (BENADRYL ALLERGY/SINUS PO), Take 10 mg by mouth 3 times daily, Disp: , Rfl:     hydroCHLOROthiazide (HYDRODIURIL) 25 MG tablet, Take 25 mg by mouth daily, Disp: , Rfl:     lisinopril (PRINIVIL;ZESTRIL) 20 MG tablet, Take 20 mg by mouth daily, Disp: , Rfl:     sodium chloride (OCEAN, BABY AYR) 0.65 % nasal spray, 2 sprays by Nasal route as needed for Congestion (5 times daily), Disp: , Rfl:     fluticasone (FLONASE) 50 MCG/ACT nasal spray, 2 sprays by Each Nostril route daily, Disp: , Rfl:     tiotropium (SPIRIVA RESPIMAT) 1.25 MCG/ACT AERS inhaler, Inhale 2 puffs into the lungs daily, Disp: , Rfl:     omeprazole (PRILOSEC) 20 MG delayed release capsule, Take 20 mg by mouth daily, Disp: , Rfl:     Abacavir-Dolutegravir-Lamivud (TRIUMEQ) 600- MG TABS, Take 1 tablet by mouth nightly, Disp: , Rfl:     rosuvastatin (CRESTOR) 40 MG tablet, Take 40 mg by mouth every evening, Disp: , Rfl:     warfarin (COUMADIN) 5 MG tablet, Take 5 mg by mouth daily Takes only 2.5mg Tuesday and Thursday, sat and sun Stopped 5 days pre op Put on lovenox bridge by physician [facility], Disp: , Rfl:     Sodium Chloride-Sodium Bicarb 1.57 g PACK, 1 Package by Nasal route daily, Disp: 30 each, Rfl: 2  Cat hair extract, Other, Seasonal, and Ultram [tramadol hcl]  Social History     Tobacco Use    Smoking No mass and lesions. Pharynx: No pharyngeal swelling, oropharyngeal exudate, posterior oropharyngeal erythema or uvula swelling. Eyes:      Conjunctiva/sclera: Conjunctivae normal.      Pupils: Pupils are equal, round, and reactive to light. Cardiovascular:      Rate and Rhythm: Normal rate. Pulmonary:      Effort: Pulmonary effort is normal. No respiratory distress. Breath sounds: Normal breath sounds. Abdominal:      General: There is no distension. Tenderness: There is no abdominal tenderness. There is no guarding. Musculoskeletal:         General: Normal range of motion. Cervical back: Normal range of motion and neck supple. Skin:     General: Skin is warm and dry. Neurological:      Mental Status: He is alert and oriented to person, place, and time. Psychiatric:         Behavior: Behavior normal.         Thought Content: Thought content normal.         Judgment: Judgment normal.         IMPRESSION/PLAN:  Patient undergo MRI, rule out any recurrent or changes postoperatively to the pituitary surgical site, including CSF leak versus recurrent disease or persistent significant infection either intrasinus or intracranial secondary to patient's known HIV status. In addition, patient will continue Singulair daily for chronic nonproductive cough as well as plain saline rinses and follow-up with ENT in 6 weeks with results. Dr. Nicolasa Cox.  Otolaryngology Facial Plastic Surgery  :  [facility] Otolaryngology/Facial Plastic Surgery Residency  Associate Clinical Professor:  DANG Boston  [facility]

## 2021-08-11 ENCOUNTER — OFFICE VISIT (OUTPATIENT)
Dept: ENT CLINIC | Age: 66
End: 2021-08-11
Payer: COMMERCIAL

## 2021-08-11 VITALS
SYSTOLIC BLOOD PRESSURE: 131 MMHG | DIASTOLIC BLOOD PRESSURE: 81 MMHG | BODY MASS INDEX: 34.48 KG/M2 | HEIGHT: 78 IN | HEART RATE: 81 BPM | WEIGHT: 298 LBS

## 2021-08-11 DIAGNOSIS — J34.3 HYPERTROPHY OF NASAL TURBINATES: Primary | ICD-10-CM

## 2021-08-11 DIAGNOSIS — J34.2 DNS (DEVIATED NASAL SEPTUM): ICD-10-CM

## 2021-08-11 DIAGNOSIS — J32.3 CHRONIC SPHENOIDAL SINUSITIS: ICD-10-CM

## 2021-08-11 DIAGNOSIS — J34.3 HYPERTROPHY OF BOTH INFERIOR NASAL TURBINATES: ICD-10-CM

## 2021-08-11 PROCEDURE — 3017F COLORECTAL CA SCREEN DOC REV: CPT | Performed by: OTOLARYNGOLOGY

## 2021-08-11 PROCEDURE — 1036F TOBACCO NON-USER: CPT | Performed by: OTOLARYNGOLOGY

## 2021-08-11 PROCEDURE — 99214 OFFICE O/P EST MOD 30 MIN: CPT | Performed by: OTOLARYNGOLOGY

## 2021-08-11 PROCEDURE — G8427 DOCREV CUR MEDS BY ELIG CLIN: HCPCS | Performed by: OTOLARYNGOLOGY

## 2021-08-11 PROCEDURE — G8417 CALC BMI ABV UP PARAM F/U: HCPCS | Performed by: OTOLARYNGOLOGY

## 2021-08-11 PROCEDURE — 1123F ACP DISCUSS/DSCN MKR DOCD: CPT | Performed by: OTOLARYNGOLOGY

## 2021-08-11 PROCEDURE — 4040F PNEUMOC VAC/ADMIN/RCVD: CPT | Performed by: OTOLARYNGOLOGY

## 2021-08-11 ASSESSMENT — ENCOUNTER SYMPTOMS
VOMITING: 0
RHINORRHEA: 1
SINUS PRESSURE: 1
SHORTNESS OF BREATH: 0
COUGH: 0

## 2021-08-11 NOTE — PROGRESS NOTES
[facility]  Dr. Adalgisa Gaspar.  Ivone Rosales. Ms.Ed        Patient Name:  Reva Owens  :  1955     CHIEF C/O:    Chief Complaint   Patient presents with    Follow-up     6wk f/u MRI results       HISTORY OBTAINED FROM:  patient    HISTORY OF PRESENT ILLNESS:       Kaushal Sims is a 77y.o. year old male, here today for follow up of frequent headches; denies any fever, chills, ; hx pituitary tumor; chornioc rhinorrhea mild relief astlein singuliar and benadryl mri shows persiostant pitutartty tumor; no obvious sign of infection but shows mild sphenopid disease       Past Medical History:   Diagnosis Date    [facility]     DVT of lower extremity (deep venous thrombosis) ([facility]     post foot surgery      GERD (gastroesophageal reflux disease)     HIV (human immunodeficiency virus infection) ([facility]     Hyperlipidemia     Hypertension     Prostate enlargement      Past Surgical History:   Procedure Laterality Date    ABDOMEN SURGERY      colon resection d/t paralytic ileus from blunt trauma to abdomen     ANKLE SURGERY Right     COLONOSCOPY      FOOT SURGERY Bilateral     hammertoe    HAMMER TOE SURGERY Left 3/22/2019    CONDYLECTOMY 4TH TOE HAMMERTOE CORRECTION  5TH TOE LEFT FOOT. V TO Y SKIN PLASTY 5TH METATARSAL PHALANGEAL JOINT LEFT FOOT performed by Lauren Moe.SUZANNA at [address]      LYMPHADENECTOMY Left     axilla & Rt side neck    OTHER SURGICAL HISTORY Left 2019    hammertoe correction 4th and 5th toes left foot, skin plasty 5th MPJ left foot       Current Outpatient Medications:     Aromatic Inhalants (VICKS VAPOR IN), Inhale into the lungs In a machine to help him breath, Disp: , Rfl:     montelukast (SINGULAIR) 10 MG tablet, Take 1 tablet by mouth daily, Disp: 30 tablet, Rfl: 3    montelukast (SINGULAIR) 10 MG tablet, Take 1 tablet by mouth daily, Disp: 30 tablet, Rfl: 3   Hypertonic Nasal Wash (SINUS RINSE) PACK, 1 kit by Nasal route daily, Disp: 1 kit, Rfl: 1    azelastine (ASTELIN) 0.1 % nasal spray, 2 sprays by Nasal route 2 times daily Use in each nostril as directed, Disp: 3 Bottle, Rfl: 2    Diphenhydramine-Pseudoephed (BENADRYL ALLERGY/SINUS PO), Take 10 mg by mouth 3 times daily, Disp: , Rfl:     hydroCHLOROthiazide (HYDRODIURIL) 25 MG tablet, Take 25 mg by mouth daily, Disp: , Rfl:     lisinopril (PRINIVIL;ZESTRIL) 20 MG tablet, Take 20 mg by mouth daily, Disp: , Rfl:     sodium chloride (OCEAN, BABY AYR) 0.65 % nasal spray, 2 sprays by Nasal route as needed for Congestion (5 times daily), Disp: , Rfl:     fluticasone (FLONASE) 50 MCG/ACT nasal spray, 2 sprays by Each Nostril route daily, Disp: , Rfl:     tiotropium (SPIRIVA RESPIMAT) 1.25 MCG/ACT AERS inhaler, Inhale 2 puffs into the lungs daily, Disp: , Rfl:     omeprazole (PRILOSEC) 20 MG delayed release capsule, Take 20 mg by mouth daily, Disp: , Rfl:     Abacavir-Dolutegravir-Lamivud (TRIUMEQ) 600- MG TABS, Take 1 tablet by mouth nightly, Disp: , Rfl:     rosuvastatin (CRESTOR) 40 MG tablet, Take 40 mg by mouth every evening, Disp: , Rfl:     warfarin (COUMADIN) 5 MG tablet, Take 5 mg by mouth daily Takes only 2.5mg Tuesday and Thursday, sat and sun Stopped 5 days pre op Put on lovenox bridge by physician at [facility], Disp: , Rfl:     Sodium Chloride-Sodium Bicarb 1.57 g PACK, 1 Package by Nasal route daily, Disp: 30 each, Rfl: 2  Cat hair extract, Other, Seasonal, and Ultram [tramadol hcl]  Social History     Tobacco Use    Smoking status: Former Smoker     Years: 40.00     Types: Cigarettes     Quit date: 2019     Years since quittin.6    Smokeless tobacco: Never Used   Substance Use Topics    Alcohol use: No    Drug use: No     Family History   Problem Relation Age of Onset    Breast Cancer Mother     Colon Cancer Mother     Heart Disease Father     Stroke Father     Diabetes Father     Lung Cancer Brother        Review of Systems   Constitutional: Negative for chills and fever. HENT: Positive for rhinorrhea and sinus pressure. Negative for ear discharge and hearing loss. Respiratory: Negative for cough and shortness of breath. Cardiovascular: Negative for chest pain and palpitations. Gastrointestinal: Negative for vomiting. Skin: Negative for rash. Allergic/Immunologic: Negative for environmental allergies. Neurological: Positive for headaches. Negative for dizziness. Hematological: Does not bruise/bleed easily. All other systems reviewed and are negative. /81 (Site: Right Upper Arm, Position: Sitting, Cuff Size: Large Adult)   Pulse 81   Ht 6' 6\" (1.981 m)   Wt 298 lb (135.2 kg)   BMI 34.44 kg/m²   Physical Exam  Vitals and nursing note reviewed. Constitutional:       Appearance: He is well-developed. HENT:      Head: Normocephalic and atraumatic. No contusion or masses. Jaw: No swelling. Salivary Glands: Right salivary gland is not diffusely enlarged. Left salivary gland is not diffusely enlarged. Right Ear: Tympanic membrane, ear canal and external ear normal. No drainage. There is no impacted cerumen. Left Ear: Tympanic membrane, ear canal and external ear normal. No drainage. There is no impacted cerumen. Nose: Rhinorrhea present. No laceration or congestion. Right Nostril: No epistaxis. Left Nostril: No epistaxis. Right Turbinates: Not enlarged. Left Turbinates: Not enlarged. Mouth/Throat:      Mouth: Mucous membranes are moist. No oral lesions. Dentition: No gum lesions. Tongue: No lesions. Palate: No mass and lesions. Pharynx: No oropharyngeal exudate or posterior oropharyngeal erythema. Eyes:      Pupils: Pupils are equal, round, and reactive to light. Neck:      Thyroid: No thyromegaly. Trachea: No tracheal deviation.    Cardiovascular:      Rate and Rhythm: Normal rate. Pulmonary:      Effort: Pulmonary effort is normal. No respiratory distress. Musculoskeletal:         General: Normal range of motion. Cervical back: Normal range of motion. Lymphadenopathy:      Cervical: No cervical adenopathy. Skin:     General: Skin is warm. Findings: No erythema. Neurological:      Mental Status: He is alert. Cranial Nerves: No cranial nerve deficit. Procedure Note    Pre-operative Diagnosis: sphenoid sinusitis    Post-operative Diagnosis: same    Anesthesia: Lidocaine 2% and Chang-Synephrine 1/2%    Endoscopy Type:  Flexible Laryngoscopy    Procedure Details: The patient was placed in the sitting position. After topical anesthesia and decongestion, the 4 mm laryngoscope was passed. The nasal cavities, nasopharynx, oropharynx, hypopharynx, and larynx were all examined. Vocal cords were examined during respiration and phonation. The following findings were noted:    Findings: widely patent sphenoid ostium no sign of active infection no sign of csf    Condition:  Stable. Patient tolerated procedure well. Complications:  None    IMPRESSION/PLAN:  Seen and examined with continued facial pressure headaches and now developing intermittent episodes of post bulbar eye pain and possible double vision, nasal endoscopy showed a wide open patent sphenoid without any mucoid drainage, he will continue medical therapy include Astelin Flonase daily, I recommend he be seen by his neurosurgical team as MRI did show either persistent or recurrent disease in the pituitary and is having ocular symptoms. Dr. Ko Rhodes.  Otolaryngology Facial Plastic Surgery  :  [facility] Otolaryngology/Facial Plastic Surgery Residency  Associate Clinical Professor:  DANG Guadarrama  [facility]

## 2021-08-11 NOTE — PATIENT INSTRUCTIONS
Copntinue with singulair,astelin nasal  Spray, and benadryl .  Can also take allegra    See neurology for pituary-Dr. Kareen Diaz ay uh

## 2021-08-24 ENCOUNTER — HOSPITAL ENCOUNTER (EMERGENCY)
Age: 66
Discharge: LEFT BEFORE DISCHARGE | End: 2021-08-24
Payer: OTHER GOVERNMENT

## 2021-08-24 ENCOUNTER — APPOINTMENT (OUTPATIENT)
Dept: GENERAL RADIOLOGY | Age: 66
End: 2021-08-24
Payer: OTHER GOVERNMENT

## 2021-08-24 ENCOUNTER — HOSPITAL ENCOUNTER (EMERGENCY)
Age: 66
Discharge: HOME | End: 2021-08-24
Attending: EMERGENCY MEDICINE
Payer: OTHER GOVERNMENT

## 2021-08-24 ENCOUNTER — APPOINTMENT (OUTPATIENT)
Dept: CT IMAGING | Age: 66
End: 2021-08-24
Payer: OTHER GOVERNMENT

## 2021-08-24 VITALS
HEIGHT: 78 IN | SYSTOLIC BLOOD PRESSURE: 137 MMHG | DIASTOLIC BLOOD PRESSURE: 89 MMHG | WEIGHT: 290 LBS | HEART RATE: 100 BPM | BODY MASS INDEX: 33.55 KG/M2 | OXYGEN SATURATION: 93 % | TEMPERATURE: 96.6 F | RESPIRATION RATE: 20 BRPM

## 2021-08-24 VITALS — TEMPERATURE: 98.1 F

## 2021-08-24 DIAGNOSIS — M25.00 HEMARTHROSIS: ICD-10-CM

## 2021-08-24 DIAGNOSIS — M25.522 LEFT ELBOW PAIN: Primary | ICD-10-CM

## 2021-08-24 LAB
ANION GAP SERPL CALCULATED.3IONS-SCNC: 10 MMOL/L (ref 7–16)
BASOPHILS ABSOLUTE: 0.06 E9/L (ref 0–0.2)
BASOPHILS RELATIVE PERCENT: 0.5 % (ref 0–2)
BUN BLDV-MCNC: 19 MG/DL (ref 6–23)
C-REACTIVE PROTEIN: 1.2 MG/DL (ref 0–0.4)
CALCIUM SERPL-MCNC: 9.4 MG/DL (ref 8.6–10.2)
CHLORIDE BLD-SCNC: 103 MMOL/L (ref 98–107)
CO2: 24 MMOL/L (ref 22–29)
CREAT SERPL-MCNC: 1.5 MG/DL (ref 0.7–1.2)
EOSINOPHILS ABSOLUTE: 0.21 E9/L (ref 0.05–0.5)
EOSINOPHILS RELATIVE PERCENT: 1.9 % (ref 0–6)
GFR AFRICAN AMERICAN: 56
GFR NON-AFRICAN AMERICAN: 47 ML/MIN/1.73
GLUCOSE BLD-MCNC: 108 MG/DL (ref 74–99)
HCT VFR BLD CALC: 42.9 % (ref 37–54)
HEMOGLOBIN: 14.7 G/DL (ref 12.5–16.5)
IMMATURE GRANULOCYTES #: 0.03 E9/L
IMMATURE GRANULOCYTES %: 0.3 % (ref 0–5)
INR BLD: 3.5
LYMPHOCYTES ABSOLUTE: 1.65 E9/L (ref 1.5–4)
LYMPHOCYTES RELATIVE PERCENT: 14.8 % (ref 20–42)
MCH RBC QN AUTO: 33.4 PG (ref 26–35)
MCHC RBC AUTO-ENTMCNC: 34.3 % (ref 32–34.5)
MCV RBC AUTO: 97.5 FL (ref 80–99.9)
MONOCYTES ABSOLUTE: 0.82 E9/L (ref 0.1–0.95)
MONOCYTES RELATIVE PERCENT: 7.4 % (ref 2–12)
NEUTROPHILS ABSOLUTE: 8.37 E9/L (ref 1.8–7.3)
NEUTROPHILS RELATIVE PERCENT: 75.1 % (ref 43–80)
PDW BLD-RTO: 14.1 FL (ref 11.5–15)
PLATELET # BLD: 228 E9/L (ref 130–450)
PMV BLD AUTO: 9.1 FL (ref 7–12)
POTASSIUM REFLEX MAGNESIUM: 4.1 MMOL/L (ref 3.5–5)
PROTHROMBIN TIME: 41.7 SEC (ref 9.3–12.4)
RBC # BLD: 4.4 E12/L (ref 3.8–5.8)
SEDIMENTATION RATE, ERYTHROCYTE: 27 MM/HR (ref 0–15)
SODIUM BLD-SCNC: 137 MMOL/L (ref 132–146)
WBC # BLD: 11.1 E9/L (ref 4.5–11.5)

## 2021-08-24 PROCEDURE — 80048 BASIC METABOLIC PNL TOTAL CA: CPT

## 2021-08-24 PROCEDURE — 86140 C-REACTIVE PROTEIN: CPT

## 2021-08-24 PROCEDURE — 6370000000 HC RX 637 (ALT 250 FOR IP): Performed by: STUDENT IN AN ORGANIZED HEALTH CARE EDUCATION/TRAINING PROGRAM

## 2021-08-24 PROCEDURE — 73070 X-RAY EXAM OF ELBOW: CPT

## 2021-08-24 PROCEDURE — 99283 EMERGENCY DEPT VISIT LOW MDM: CPT

## 2021-08-24 PROCEDURE — 85610 PROTHROMBIN TIME: CPT

## 2021-08-24 PROCEDURE — 72131 CT LUMBAR SPINE W/O DYE: CPT

## 2021-08-24 PROCEDURE — 85651 RBC SED RATE NONAUTOMATED: CPT

## 2021-08-24 PROCEDURE — 85025 COMPLETE CBC W/AUTO DIFF WBC: CPT

## 2021-08-24 PROCEDURE — 99284 EMERGENCY DEPT VISIT MOD MDM: CPT

## 2021-08-24 RX ORDER — OXYCODONE HYDROCHLORIDE AND ACETAMINOPHEN 5; 325 MG/1; MG/1
1 TABLET ORAL ONCE
Status: COMPLETED | OUTPATIENT
Start: 2021-08-24 | End: 2021-08-24

## 2021-08-24 RX ORDER — HYDROCODONE BITARTRATE AND ACETAMINOPHEN 5; 325 MG/1; MG/1
1 TABLET ORAL EVERY 6 HOURS PRN
Qty: 12 TABLET | Refills: 0 | Status: SHIPPED | OUTPATIENT
Start: 2021-08-24 | End: 2021-08-27

## 2021-08-24 RX ADMIN — OXYCODONE AND ACETAMINOPHEN 1 TABLET: 5; 325 TABLET ORAL at 11:07

## 2021-08-24 ASSESSMENT — PAIN DESCRIPTION - LOCATION: LOCATION: ARM;BACK

## 2021-08-24 ASSESSMENT — ENCOUNTER SYMPTOMS
SHORTNESS OF BREATH: 0
EYE PAIN: 0
WHEEZING: 0
TROUBLE SWALLOWING: 0
COUGH: 0
CONSTIPATION: 0
PHOTOPHOBIA: 0
NAUSEA: 0
APNEA: 0
CHEST TIGHTNESS: 0
SORE THROAT: 0
VOMITING: 0
ABDOMINAL PAIN: 0
EYE REDNESS: 0
BACK PAIN: 1
RHINORRHEA: 0
DIARRHEA: 0

## 2021-08-24 ASSESSMENT — PAIN SCALES - GENERAL
PAINLEVEL_OUTOF10: 9
PAINLEVEL_OUTOF10: 9

## 2021-08-24 ASSESSMENT — PAIN DESCRIPTION - ORIENTATION: ORIENTATION: RIGHT;LOWER

## 2021-08-24 ASSESSMENT — PAIN DESCRIPTION - PAIN TYPE: TYPE: ACUTE PAIN

## 2021-08-24 NOTE — ED PROVIDER NOTES
[address]      Pt Name: Darek Conrad  MRN: [id number]  [place] 1955  Date of evaluation: 8/24/2021      CHIEF COMPLAINT       Chief Complaint   Patient presents with    Arm Pain     atraumatic right arm pain that began at 0130 this morning.  Back Pain     right side lower back pain after falling around 0830 this morning when trying to get into his truck. Denies LOC. HPI  Darek Conrad is a 77 y.o. male with history of COPD, HIV on triumeq, history of blood clots who presents to the emergency department with right-sided elbow pain. The patient states that yesterday started having vague pain in his right elbow. It worsened overnight. He states his moderate in nature worse with any movement. He does not want to move his elbow secondary to pain. Is better at rest.  Initially came to the ER for evaluation and at one point later this morning after trying to get to his truck fell and injured his back. He is describing mild back pain is worse with twisting and movement. Denies any other injury. Denies hitting his elbow. He thinks that maybe several days ago he might of twisted his elbow in the shower but is unsure. No clear mechanism of injury. He denies any loss of consciousness, headache, fevers, chills, rash or redness. Except as noted above the remainder of the review of systems was reviewed and negative. Review of Systems   Constitutional: Negative for activity change, chills, diaphoresis, fatigue and fever. HENT: Negative for rhinorrhea, sore throat and trouble swallowing. Eyes: Negative for photophobia, pain and redness. Respiratory: Negative for apnea, cough, chest tightness, shortness of breath and wheezing. Cardiovascular: Negative for chest pain, palpitations and leg swelling. Gastrointestinal: Negative for abdominal pain, constipation, diarrhea, nausea and vomiting. Endocrine: Negative for polyuria. Genitourinary: Negative for difficulty urinating and dysuria. Musculoskeletal: Positive for back pain. Negative for neck pain and neck stiffness. Reports right elbow pain   Skin: Negative for pallor and rash. Neurological: Negative for dizziness, syncope, weakness, light-headedness and numbness. Psychiatric/Behavioral: Negative for confusion. The patient is not nervous/anxious. Physical Exam  Vitals and nursing note reviewed. Constitutional:       General: He is not in acute distress. Appearance: He is well-developed. Comments: Awake and alert. Sitting in the gurney in no obvious distress. HENT:      Head: Normocephalic and atraumatic. Mouth/Throat:      Mouth: Mucous membranes are moist.      Pharynx: No oropharyngeal exudate. Eyes:      General: No scleral icterus. Pupils: Pupils are equal, round, and reactive to light. Cardiovascular:      Rate and Rhythm: Normal rate and regular rhythm. Heart sounds: Normal heart sounds. No murmur heard. Comments: 2+ radial and dorsal pedis pulses bilaterally  Pulmonary:      Effort: Pulmonary effort is normal. No respiratory distress. Breath sounds: Normal breath sounds. No wheezing. Abdominal:      Palpations: Abdomen is soft. Tenderness: There is no abdominal tenderness. There is no guarding or rebound. Musculoskeletal:         General: Tenderness present. No deformity. Cervical back: Normal range of motion and neck supple. Right lower leg: No edema. Left lower leg: No edema. Comments: Decreased range of motion with tenderness to the right elbow. Is able to move the elbow just limited in range of motion. Skin:     General: Skin is warm and dry. Capillary Refill: Capillary refill takes less than 2 seconds. Findings: No rash. Comments: No erythema no signs of septic joint. Neurological:      General: No focal deficit present. Mental Status: He is alert and oriented to person, place, and time. Cranial Nerves: No cranial nerve deficit. Sensory: No sensory deficit. Motor: No weakness or abnormal muscle tone. Psychiatric:         Mood and Affect: Mood normal.         Behavior: Behavior normal.          Procedures     MDM   This is a 24-year-old male with a history of HIV COPD presents with right elbow pain. In the emergency department is awake and alert, hemodynamic stable, afebrile respiratory stress. Anticoagulated on Coumadin. Signs of effusion to the elbow. Concern for occult fracture versus hemarthrosis. Discussed with orthopedics who suggest sling and constipation follow-up. No signs of septic joint or infectious process. Patient with complaint of back pain CT imaging shows no acute pathology to the back. Normal neurologic exam.  No red flag signs or symptoms examination. Discussed plan for discharge home as well as return precautions and plan for postoperative follow-up patient understands and agrees to plan.                  --------------------------------------------- PAST HISTORY ---------------------------------------------  Past Medical History:  has a past medical history of COPD (chronic obstructive pulmonary disease) ([facility] GERD (gastroesophageal reflux disease), HIV (human immunodeficiency virus infection) ([facility] Hyperlipidemia, Hypertension, and Prostate enlargement. Past Surgical History:  has a past surgical history that includes Abdomen surgery (1970); lymphadenectomy (Left); Colonoscopy; Foot surgery (Bilateral); Ankle surgery (Right); hernia repair; Heel spur surgery; other surgical history (Left, 03/22/2019); and Hammer toe surgery (Left, 3/22/2019). Social History:  reports that he quit smoking about 2 years ago. His smoking use included cigarettes. He quit after 40.00 years of use.  He has never used smokeless tobacco. He reports that he does not drink alcohol and does not use drugs. Family History: family history includes Breast Cancer in his mother; Colon Cancer in his mother; Diabetes in his father; Heart Disease in his father; Chinqu[place]in Noemi in his brother; Stroke in his father. The patients home medications have been reviewed.     Allergies: Cat hair extract, Other, Seasonal, and Ultram [tramadol hcl]    -------------------------------------------------- RESULTS -------------------------------------------------  Labs:  Results for orders placed or performed during the hospital encounter of 08/24/21   CBC Auto Differential   Result Value Ref Range    WBC 11.1 4.5 - 11.5 E9/L    RBC 4.40 3.80 - 5.80 E12/L    Hemoglobin 14.7 12.5 - 16.5 g/dL    Hematocrit 42.9 37.0 - 54.0 %    MCV 97.5 80.0 - 99.9 fL    MCH 33.4 26.0 - 35.0 pg    MCHC 34.3 32.0 - 34.5 %    RDW 14.1 11.5 - 15.0 fL    Platelets 734 628 - 877 E9/L    MPV 9.1 7.0 - 12.0 fL    Neutrophils % 75.1 43.0 - 80.0 %    Immature Granulocytes % 0.3 0.0 - 5.0 %    Lymphocytes % 14.8 (L) 20.0 - 42.0 %    Monocytes % 7.4 2.0 - 12.0 %    Eosinophils % 1.9 0.0 - 6.0 %    Basophils % 0.5 0.0 - 2.0 %    Neutrophils Absolute 8.37 (H) 1.80 - 7.30 E9/L    Immature Granulocytes # 0.03 E9/L    Lymphocytes Absolute 1.65 1.50 - 4.00 E9/L    Monocytes Absolute 0.82 0.10 - 0.95 E9/L    Eosinophils Absolute 0.21 0.05 - 0.50 E9/L    Basophils Absolute 0.06 0.00 - 0.20 K3/G   Basic Metabolic Panel w/ Reflex to MG   Result Value Ref Range    Sodium 137 132 - 146 mmol/L    Potassium reflex Magnesium 4.1 3.5 - 5.0 mmol/L    Chloride 103 98 - 107 mmol/L    CO2 24 22 - 29 mmol/L    Anion Gap 10 7 - 16 mmol/L    Glucose 108 (H) 74 - 99 mg/dL    BUN 19 6 - 23 mg/dL    CREATININE 1.5 (H) 0.7 - 1.2 mg/dL    GFR Non-African American 47 >=60 mL/min/1.73    GFR African American 56     Calcium 9.4 8.6 - 10.2 mg/dL   Sedimentation Rate   Result Value Ref Range    Sed Rate 27 (H) 0 - 15 mm/Hr   C-reactive protein Result Value Ref Range    CRP 1.2 (H) 0.0 - 0.4 mg/dL   Protime-INR   Result Value Ref Range    Protime 41.7 (H) 9.3 - 12.4 sec    INR 3.5        Radiology:  XR ELBOW RIGHT (2 VIEWS)   Final Result   Joint effusion suggesting an underlying occult fracture. Olecranon spur. See   above. CT LUMBAR SPINE WO CONTRAST   Final Result   Degenerative changes. No acute fracture or subluxation.             ------------------------- NURSING NOTES AND VITALS REVIEWED ---------------------------  Date / Time Roomed:  8/24/2021 10:06 AM  ED Bed Assignment:  SAROJ/SAROJ    The nursing notes within the ED encounter and vital signs as below have been reviewed. /89   Pulse 100   Temp 96.6 °F (35.9 °C) (Temporal)   Resp 20   Ht 6' 6\" (1.981 m)   Wt 290 lb (131.5 kg)   SpO2 93%   BMI 33.51 kg/m²   Oxygen Saturation Interpretation: Normal      ------------------------------------------ PROGRESS NOTES ------------------------------------------    I have spoken with the patient and discussed todays results, in addition to providing specific details for the plan of care and counseling regarding the diagnosis and prognosis. Their questions are answered at this time and they are agreeable with the plan. I discussed at length with them reasons for immediate return here for re evaluation. They will followup with their primary care physician by calling their office tomorrow. --------------------------------- ADDITIONAL PROVIDER NOTES ---------------------------------  At this time the patient is without objective evidence of an acute process requiring hospitalization or inpatient management. They have remained hemodynamically stable throughout their entire ED visit and are stable for discharge with outpatient follow-up. The plan has been discussed in detail and they are aware of the specific conditions for emergent return, as well as the importance of follow-up.       Discharge Medication List as of 8/24/2021  1:12 PM      START taking these medications    Details   HYDROcodone-acetaminophen (NORCO) 5-325 MG per tablet Take 1 tablet by mouth every 6 hours as needed for Pain for up to 3 days. Intended supply: 3 days. Take lowest dose possible to manage pain, Disp-12 tablet, R-0Print             Diagnosis:  1. Left elbow pain    2. Hemarthrosis        Disposition:  Patient's disposition: Discharge to home  Patient's condition is stable.        Lavinia Smith DO  Resident  08/25/21 [id number]

## 2021-10-30 LAB
ALBUMIN SERPL-MCNC: NORMAL G/DL
ALP BLD-CCNC: NORMAL U/L
ALT SERPL-CCNC: NORMAL U/L
ANION GAP SERPL CALCULATED.3IONS-SCNC: NORMAL MMOL/L
AST SERPL-CCNC: NORMAL U/L
BASOPHILS ABSOLUTE: NORMAL
BASOPHILS RELATIVE PERCENT: NORMAL
BILIRUB SERPL-MCNC: NORMAL MG/DL
BUN BLDV-MCNC: NORMAL MG/DL
CALCIUM SERPL-MCNC: NORMAL MG/DL
CHLORIDE BLD-SCNC: NORMAL MMOL/L
CHOLESTEROL, TOTAL: NORMAL
CHOLESTEROL/HDL RATIO: NORMAL
CO2: NORMAL
CREAT SERPL-MCNC: NORMAL MG/DL
EOSINOPHILS ABSOLUTE: NORMAL
EOSINOPHILS RELATIVE PERCENT: NORMAL
GFR CALCULATED: NORMAL
GLUCOSE BLD-MCNC: NORMAL MG/DL
HCT VFR BLD CALC: NORMAL %
HDLC SERPL-MCNC: NORMAL MG/DL
HEMOGLOBIN: NORMAL
LDL CHOLESTEROL CALCULATED: NORMAL
LYMPHOCYTES ABSOLUTE: NORMAL
LYMPHOCYTES RELATIVE PERCENT: NORMAL
MCH RBC QN AUTO: NORMAL PG
MCHC RBC AUTO-ENTMCNC: NORMAL G/DL
MCV RBC AUTO: NORMAL FL
MONOCYTES ABSOLUTE: NORMAL
MONOCYTES RELATIVE PERCENT: NORMAL
NEUTROPHILS ABSOLUTE: NORMAL
NEUTROPHILS RELATIVE PERCENT: NORMAL
NONHDLC SERPL-MCNC: NORMAL MG/DL
PDW BLD-RTO: NORMAL %
PLATELET # BLD: NORMAL 10*3/UL
PMV BLD AUTO: NORMAL FL
POTASSIUM SERPL-SCNC: NORMAL MMOL/L
RBC # BLD: NORMAL 10*6/UL
SODIUM BLD-SCNC: NORMAL MMOL/L
TOTAL PROTEIN: NORMAL
TRIGL SERPL-MCNC: NORMAL MG/DL
TSH SERPL DL<=0.05 MIU/L-ACNC: 1.14 UIU/ML
VITAMIN D 25-HYDROXY: NORMAL
VITAMIN D2, 25 HYDROXY: NORMAL
VITAMIN D3,25 HYDROXY: NORMAL
VLDLC SERPL CALC-MCNC: NORMAL MG/DL
WBC # BLD: NORMAL 10*3/UL

## 2021-11-05 ENCOUNTER — TELEPHONE (OUTPATIENT)
Dept: ENT CLINIC | Age: 66
End: 2021-11-05

## 2021-11-05 RX ORDER — MONTELUKAST SODIUM 10 MG/1
10 TABLET ORAL DAILY
Qty: 30 TABLET | Refills: 3 | Status: SHIPPED
Start: 2021-11-05 | End: 2021-11-10 | Stop reason: SDUPTHER

## 2021-11-10 ENCOUNTER — OFFICE VISIT (OUTPATIENT)
Dept: ENT CLINIC | Age: 66
End: 2021-11-10
Payer: COMMERCIAL

## 2021-11-10 VITALS
HEIGHT: 78 IN | BODY MASS INDEX: 34.07 KG/M2 | HEART RATE: 92 BPM | WEIGHT: 294.5 LBS | DIASTOLIC BLOOD PRESSURE: 72 MMHG | SYSTOLIC BLOOD PRESSURE: 101 MMHG

## 2021-11-10 DIAGNOSIS — J34.3 HYPERTROPHY OF NASAL TURBINATES: Primary | ICD-10-CM

## 2021-11-10 DIAGNOSIS — J30.9 ALLERGIC RHINITIS, UNSPECIFIED SEASONALITY, UNSPECIFIED TRIGGER: ICD-10-CM

## 2021-11-10 DIAGNOSIS — J30.0 VASOMOTOR RHINITIS: ICD-10-CM

## 2021-11-10 PROCEDURE — 1123F ACP DISCUSS/DSCN MKR DOCD: CPT | Performed by: OTOLARYNGOLOGY

## 2021-11-10 PROCEDURE — G8417 CALC BMI ABV UP PARAM F/U: HCPCS | Performed by: OTOLARYNGOLOGY

## 2021-11-10 PROCEDURE — 4040F PNEUMOC VAC/ADMIN/RCVD: CPT | Performed by: OTOLARYNGOLOGY

## 2021-11-10 PROCEDURE — 99214 OFFICE O/P EST MOD 30 MIN: CPT | Performed by: OTOLARYNGOLOGY

## 2021-11-10 PROCEDURE — G8427 DOCREV CUR MEDS BY ELIG CLIN: HCPCS | Performed by: OTOLARYNGOLOGY

## 2021-11-10 PROCEDURE — 3017F COLORECTAL CA SCREEN DOC REV: CPT | Performed by: OTOLARYNGOLOGY

## 2021-11-10 PROCEDURE — G8484 FLU IMMUNIZE NO ADMIN: HCPCS | Performed by: OTOLARYNGOLOGY

## 2021-11-10 PROCEDURE — 1036F TOBACCO NON-USER: CPT | Performed by: OTOLARYNGOLOGY

## 2021-11-10 ASSESSMENT — ENCOUNTER SYMPTOMS: RHINORRHEA: 1

## 2021-11-10 NOTE — PROGRESS NOTES
[facility] Otolaryngology  Dr. Margarita Mcclendon D.O. Ms.Ed. New Consult       Patient Name:  Carline Calix  :  1955     CHIEF C/O:    Chief Complaint   Patient presents with    Sinus Problem     no problems today       HISTORY OBTAINED FROM:  patient    HISTORY OF PRESENT ILLNESS:       Rayray Lyn is a 74y.o. year old male, here today for routine follow up of sinus complaints. Patient's biggest concern is the amount of drainage. He stopped using Singulair and started taking Claritin-D everyday which provides him with relief temporarily. He continues to use Flonase and Astelin intermittently without improvement. Denies trouble swallowing, breathing or speaking.        Past Medical History:   Diagnosis Date    [facility]     DVT of lower extremity (deep venous thrombosis) ([facility]     post foot surgery      GERD (gastroesophageal reflux disease)     HIV (human immunodeficiency virus infection) ([facility]     Hyperlipidemia     Hypertension     Prostate enlargement      Past Surgical History:   Procedure Laterality Date    ABDOMEN SURGERY      colon resection d/t paralytic ileus from blunt trauma to abdomen     ANKLE SURGERY Right     COLONOSCOPY      FOOT SURGERY Bilateral     hammertoe    HAMMER TOE SURGERY Left 3/22/2019    CONDYLECTOMY 4TH TOE HAMMERTOE CORRECTION  5TH TOE LEFT FOOT. V TO Y SKIN PLASTY 5TH METATARSAL PHALANGEAL JOINT LEFT FOOT performed by Kathrin Shi DPM at [address]      LYMPHADENECTOMY Left     axilla & Rt side neck    OTHER SURGICAL HISTORY Left 2019    hammertoe correction 4th and 5th toes left foot, skin plasty 5th MPJ left foot       Current Outpatient Medications:     Loratadine-Pseudoephedrine (CLARITIN-D 12 HOUR PO), Take by mouth, Disp: , Rfl:     Aromatic Inhalants (VICKS VAPOR IN), Inhale into the lungs In a machine to help him breath, Disp: , Rfl:    Hypertonic Nasal Wash (SINUS RINSE) PACK, 1 kit by Nasal route daily, Disp: 1 kit, Rfl: 1    azelastine (ASTELIN) 0.1 % nasal spray, 2 sprays by Nasal route 2 times daily Use in each nostril as directed, Disp: 3 Bottle, Rfl: 2    Diphenhydramine-Pseudoephed (BENADRYL ALLERGY/SINUS PO), Take 10 mg by mouth 3 times daily, Disp: , Rfl:     hydroCHLOROthiazide (HYDRODIURIL) 25 MG tablet, Take 25 mg by mouth daily, Disp: , Rfl:     lisinopril (PRINIVIL;ZESTRIL) 20 MG tablet, Take 20 mg by mouth daily, Disp: , Rfl:     sodium chloride (OCEAN, BABY AYR) 0.65 % nasal spray, 2 sprays by Nasal route as needed for Congestion (5 times daily), Disp: , Rfl:     fluticasone (FLONASE) 50 MCG/ACT nasal spray, 2 sprays by Each Nostril route daily, Disp: , Rfl:     tiotropium (SPIRIVA RESPIMAT) 1.25 MCG/ACT AERS inhaler, Inhale 2 puffs into the lungs daily, Disp: , Rfl:     omeprazole (PRILOSEC) 20 MG delayed release capsule, Take 20 mg by mouth daily, Disp: , Rfl:     Abacavir-Dolutegravir-Lamivud (TRIUMEQ) 600- MG TABS, Take 1 tablet by mouth nightly, Disp: , Rfl:     rosuvastatin (CRESTOR) 40 MG tablet, Take 40 mg by mouth every evening, Disp: , Rfl:     warfarin (COUMADIN) 5 MG tablet, Take 5 mg by mouth daily Takes only 2.5mg Tuesday and Thursday, sat and sun Stopped 5 days pre op Put on lovenox bridge by physician at [facility], Disp: , Rfl:     montelukast (SINGULAIR) 10 MG tablet, Take 1 tablet by mouth daily (Patient not taking: Reported on 11/10/2021), Disp: 30 tablet, Rfl: 3    Sodium Chloride-Sodium Bicarb 1.57 g PACK, 1 Package by Nasal route daily, Disp: 30 each, Rfl: 2  Cat hair extract, Other, Seasonal, and Ultram [tramadol hcl]  Social History     Tobacco Use    Smoking status: Former Smoker     Years: 40.00     Types: Cigarettes     Quit date: 2019     Years since quittin.8    Smokeless tobacco: Never Used   Substance Use Topics    Alcohol use: No    Drug use: No     Family History Problem Relation Age of Onset    Breast Cancer Mother     Colon Cancer Mother     Heart Disease Father     Stroke Father     Diabetes Father     Lung Cancer Brother        Review of Systems   Constitutional: Negative for chills and fever. HENT: Positive for congestion, postnasal drip and rhinorrhea. Negative for ear discharge and hearing loss. Respiratory: Negative for cough and shortness of breath. Cardiovascular: Negative for chest pain and palpitations. Gastrointestinal: Negative for vomiting. Skin: Negative for rash. Allergic/Immunologic: Negative for environmental allergies. Neurological: Negative for dizziness and headaches. Hematological: Does not bruise/bleed easily. All other systems reviewed and are negative. /72 (Site: Right Upper Arm, Position: Sitting, Cuff Size: Large Adult)   Pulse 92   Ht 6' 6\" (1.981 m)   Wt 294 lb 8 oz (133.6 kg)   BMI 34.03 kg/m²   Physical Exam  Constitutional:       General: He is not in acute distress. Appearance: Normal appearance. HENT:      Head: Normocephalic and atraumatic. Right Ear: Tympanic membrane and ear canal normal.      Left Ear: Tympanic membrane and ear canal normal.      Nose: Congestion and rhinorrhea present. Comments: crusting     Mouth/Throat:      Mouth: Mucous membranes are moist.      Pharynx: No oropharyngeal exudate. Eyes:      Extraocular Movements: Extraocular movements intact. Pupils: Pupils are equal, round, and reactive to light. Cardiovascular:      Rate and Rhythm: Normal rate. Pulmonary:      Effort: Pulmonary effort is normal.   Musculoskeletal:         General: Normal range of motion. Cervical back: Normal range of motion and neck supple. Skin:     General: Skin is warm and dry. Neurological:      General: No focal deficit present. Mental Status: He is alert and oriented to person, place, and time.    Psychiatric:         Mood and Affect: Mood normal. Behavior: Behavior normal.         IMPRESSION/PLAN:  Patient seen and examined for routine sinus follow up. He continues to have significant drainage despite maximal medical therapy. Suspect that there is a component of vasomotor rhinitis and we discussed with him the option to perform Clarifix here in the office. Discussed risks, benefits and alternatives. Risks include bleeding, infection, damage to surrounding structures and need for further procedures. Patient verbalized understanding and agreed to proceed. Will schedule him for in office Clarifix. Electronically signed by Mohamud Chauhan DO on 11/10/2021 at 9:02 AM    Dr. Humza Viveros.  Otolaryngology Facial Plastic Surgery  :[facility] Otolaryngology/Facial Plastic Surgery Residency  Associate Clinical Professor:  Rakesh Rider, [facility]  1955      I have discussed the case, including pertinent history and exam findings with the resident. I have seen and examined the patient and the key elements of the encounter have been performed by me. I agree with the assessment, plan and orders as documented by the resident. Patient here for follow up of medical problems. Remainder of medical problems as per resident note.       [address], DO  11/30/21

## 2021-11-30 ASSESSMENT — ENCOUNTER SYMPTOMS
VOMITING: 0
SHORTNESS OF BREATH: 0
COUGH: 0

## 2021-12-21 ENCOUNTER — TELEPHONE (OUTPATIENT)
Dept: ENT CLINIC | Age: 66
End: 2021-12-21

## 2021-12-21 RX ORDER — MONTELUKAST SODIUM 10 MG/1
10 TABLET ORAL DAILY
Qty: 30 TABLET | Refills: 3 | Status: SHIPPED | OUTPATIENT
Start: 2021-12-21

## 2021-12-29 ENCOUNTER — TELEPHONE (OUTPATIENT)
Dept: ENT CLINIC | Age: 66
End: 2021-12-29

## 2022-01-07 ENCOUNTER — PROCEDURE VISIT (OUTPATIENT)
Dept: ENT CLINIC | Age: 67
End: 2022-01-07
Payer: COMMERCIAL

## 2022-01-07 VITALS
HEART RATE: 90 BPM | HEIGHT: 78 IN | SYSTOLIC BLOOD PRESSURE: 111 MMHG | WEIGHT: 300.3 LBS | DIASTOLIC BLOOD PRESSURE: 71 MMHG | BODY MASS INDEX: 34.74 KG/M2

## 2022-01-07 DIAGNOSIS — J30.0 VASOMOTOR RHINITIS: ICD-10-CM

## 2022-01-07 DIAGNOSIS — J34.3 HYPERTROPHY OF NASAL TURBINATES: Primary | ICD-10-CM

## 2022-01-07 PROCEDURE — 30117 REMOVAL OF INTRANASAL LESION: CPT | Performed by: OTOLARYNGOLOGY

## 2022-01-07 RX ORDER — LIDOCAINE HYDROCHLORIDE 40 MG/ML
SOLUTION TOPICAL ONCE
Status: COMPLETED | OUTPATIENT
Start: 2022-01-07 | End: 2022-01-07

## 2022-01-07 RX ORDER — LIDOCAINE HYDROCHLORIDE AND EPINEPHRINE 10; 10 MG/ML; UG/ML
20 INJECTION, SOLUTION INFILTRATION; PERINEURAL ONCE
Status: COMPLETED | OUTPATIENT
Start: 2022-01-07 | End: 2022-01-07

## 2022-01-07 RX ADMIN — LIDOCAINE HYDROCHLORIDE: 40 SOLUTION TOPICAL at 11:59

## 2022-01-07 RX ADMIN — LIDOCAINE HYDROCHLORIDE AND EPINEPHRINE 20 ML: 10; 10 INJECTION, SOLUTION INFILTRATION; PERINEURAL at 12:00

## 2022-01-07 NOTE — PROGRESS NOTES
Op Note    Pre op diagnosis: Vasomotor rhinitis    Post Op: Same    Procedure: Intranasal neurectomy secondary to vasomotor rhinitis refractory to medical therapy    Anesthesia: Topical 4% lidocaine, 1% lidocaine epinephrine total of 5 cc    Surgeon: Timmy Drake    Procedure Note: Patient was properly consented position, 4% topical lidocaine pledgets were placed in the nose allowed to sit for approximate 5 to 7 minutes, with topical anesthesia was induced, a spinal needle is used to inject the bilateral middle turbinates, ostiomeatal complex, as well as the nasal septum with 1% lidocaine with epinephrine for total of 5 cc. Once adequate time surpassed for hemostasis as well as anesthesia, clear fix device was then inserted, and placed into the sphenopalatine recess and activated for 30seconds and released without further 30, both left and right sphenopalatine recesses. Patient tolerated well minimal bleeding. Complications: none    Blood Loss: Min.     Disposition: home

## 2022-01-14 ENCOUNTER — OFFICE VISIT (OUTPATIENT)
Dept: ENT CLINIC | Age: 67
End: 2022-01-14

## 2022-01-14 VITALS — WEIGHT: 300 LBS | BODY MASS INDEX: 34.71 KG/M2 | HEIGHT: 78 IN

## 2022-01-14 DIAGNOSIS — J34.2 DNS (DEVIATED NASAL SEPTUM): Primary | ICD-10-CM

## 2022-01-14 DIAGNOSIS — J30.0 VASOMOTOR RHINITIS: ICD-10-CM

## 2022-01-14 DIAGNOSIS — J34.3 HYPERTROPHY OF NASAL TURBINATES: ICD-10-CM

## 2022-01-14 DIAGNOSIS — J34.3 HYPERTROPHY OF BOTH INFERIOR NASAL TURBINATES: ICD-10-CM

## 2022-01-14 PROCEDURE — 99024 POSTOP FOLLOW-UP VISIT: CPT | Performed by: OTOLARYNGOLOGY

## 2022-01-16 ENCOUNTER — APPOINTMENT (OUTPATIENT)
Dept: ULTRASOUND IMAGING | Age: 67
End: 2022-01-16
Payer: OTHER GOVERNMENT

## 2022-01-16 ENCOUNTER — APPOINTMENT (OUTPATIENT)
Dept: GENERAL RADIOLOGY | Age: 67
End: 2022-01-16
Payer: OTHER GOVERNMENT

## 2022-01-16 ENCOUNTER — HOSPITAL ENCOUNTER (EMERGENCY)
Age: 67
Discharge: HOME | End: 2022-01-16
Attending: EMERGENCY MEDICINE
Payer: OTHER GOVERNMENT

## 2022-01-16 VITALS
SYSTOLIC BLOOD PRESSURE: 130 MMHG | RESPIRATION RATE: 16 BRPM | HEIGHT: 78 IN | BODY MASS INDEX: 33.55 KG/M2 | HEART RATE: 88 BPM | DIASTOLIC BLOOD PRESSURE: 72 MMHG | TEMPERATURE: 98.1 F | WEIGHT: 290 LBS | OXYGEN SATURATION: 96 %

## 2022-01-16 DIAGNOSIS — M25.461 KNEE EFFUSION, RIGHT: Primary | ICD-10-CM

## 2022-01-16 LAB
ANION GAP SERPL CALCULATED.3IONS-SCNC: 8 MMOL/L (ref 7–16)
BASOPHILS ABSOLUTE: 0.05 E9/L (ref 0–0.2)
BASOPHILS RELATIVE PERCENT: 0.6 % (ref 0–2)
BUN BLDV-MCNC: 22 MG/DL (ref 6–23)
CALCIUM SERPL-MCNC: 9.2 MG/DL (ref 8.6–10.2)
CHLORIDE BLD-SCNC: 102 MMOL/L (ref 98–107)
CO2: 25 MMOL/L (ref 22–29)
CREAT SERPL-MCNC: 1.5 MG/DL (ref 0.7–1.2)
EOSINOPHILS ABSOLUTE: 0.23 E9/L (ref 0.05–0.5)
EOSINOPHILS RELATIVE PERCENT: 2.7 % (ref 0–6)
GFR AFRICAN AMERICAN: 56
GFR NON-AFRICAN AMERICAN: 47 ML/MIN/1.73
GLUCOSE BLD-MCNC: 106 MG/DL (ref 74–99)
HCT VFR BLD CALC: 40.1 % (ref 37–54)
HEMOGLOBIN: 13.6 G/DL (ref 12.5–16.5)
IMMATURE GRANULOCYTES #: 0.02 E9/L
IMMATURE GRANULOCYTES %: 0.2 % (ref 0–5)
INR BLD: 2.6
LYMPHOCYTES ABSOLUTE: 1.23 E9/L (ref 1.5–4)
LYMPHOCYTES RELATIVE PERCENT: 14.6 % (ref 20–42)
MCH RBC QN AUTO: 32.5 PG (ref 26–35)
MCHC RBC AUTO-ENTMCNC: 33.9 % (ref 32–34.5)
MCV RBC AUTO: 95.9 FL (ref 80–99.9)
MONOCYTES ABSOLUTE: 0.73 E9/L (ref 0.1–0.95)
MONOCYTES RELATIVE PERCENT: 8.7 % (ref 2–12)
NEUTROPHILS ABSOLUTE: 6.15 E9/L (ref 1.8–7.3)
NEUTROPHILS RELATIVE PERCENT: 73.2 % (ref 43–80)
PDW BLD-RTO: 14.1 FL (ref 11.5–15)
PLATELET # BLD: 225 E9/L (ref 130–450)
PMV BLD AUTO: 9.1 FL (ref 7–12)
POTASSIUM REFLEX MAGNESIUM: 4.2 MMOL/L (ref 3.5–5)
PROTHROMBIN TIME: 31.2 SEC (ref 9.3–12.4)
RBC # BLD: 4.18 E12/L (ref 3.8–5.8)
SODIUM BLD-SCNC: 135 MMOL/L (ref 132–146)
WBC # BLD: 8.4 E9/L (ref 4.5–11.5)

## 2022-01-16 PROCEDURE — 93971 EXTREMITY STUDY: CPT

## 2022-01-16 PROCEDURE — 99285 EMERGENCY DEPT VISIT HI MDM: CPT

## 2022-01-16 PROCEDURE — 85025 COMPLETE CBC W/AUTO DIFF WBC: CPT

## 2022-01-16 PROCEDURE — 2580000003 HC RX 258

## 2022-01-16 PROCEDURE — 36415 COLL VENOUS BLD VENIPUNCTURE: CPT

## 2022-01-16 PROCEDURE — 80048 BASIC METABOLIC PNL TOTAL CA: CPT

## 2022-01-16 PROCEDURE — 6370000000 HC RX 637 (ALT 250 FOR IP): Performed by: STUDENT IN AN ORGANIZED HEALTH CARE EDUCATION/TRAINING PROGRAM

## 2022-01-16 PROCEDURE — 73562 X-RAY EXAM OF KNEE 3: CPT

## 2022-01-16 PROCEDURE — 85610 PROTHROMBIN TIME: CPT

## 2022-01-16 RX ORDER — HYDROCODONE BITARTRATE AND ACETAMINOPHEN 5; 325 MG/1; MG/1
1 TABLET ORAL ONCE
Status: COMPLETED | OUTPATIENT
Start: 2022-01-16 | End: 2022-01-16

## 2022-01-16 RX ORDER — HYDROCODONE BITARTRATE AND ACETAMINOPHEN 5; 325 MG/1; MG/1
1 TABLET ORAL EVERY 8 HOURS PRN
Qty: 9 TABLET | Refills: 0 | Status: SHIPPED | OUTPATIENT
Start: 2022-01-16 | End: 2022-01-19

## 2022-01-16 RX ORDER — SODIUM CHLORIDE 0.9 % (FLUSH) 0.9 %
SYRINGE (ML) INJECTION
Status: COMPLETED
Start: 2022-01-16 | End: 2022-01-16

## 2022-01-16 RX ADMIN — HYDROCODONE BITARTRATE AND ACETAMINOPHEN 1 TABLET: 5; 325 TABLET ORAL at 13:55

## 2022-01-16 RX ADMIN — Medication: at 11:54

## 2022-01-16 ASSESSMENT — ENCOUNTER SYMPTOMS
SHORTNESS OF BREATH: 0
SORE THROAT: 0
BACK PAIN: 0
EYE ITCHING: 0
EYE PAIN: 0
ABDOMINAL PAIN: 0
EYE REDNESS: 0
FACIAL SWELLING: 0
SINUS PAIN: 0
CONSTIPATION: 0
COUGH: 0
DIARRHEA: 0

## 2022-01-16 ASSESSMENT — PAIN DESCRIPTION - FREQUENCY: FREQUENCY: CONTINUOUS

## 2022-01-16 ASSESSMENT — PAIN DESCRIPTION - LOCATION: LOCATION: KNEE

## 2022-01-16 ASSESSMENT — PAIN DESCRIPTION - PAIN TYPE: TYPE: ACUTE PAIN

## 2022-01-16 ASSESSMENT — PAIN DESCRIPTION - ORIENTATION: ORIENTATION: RIGHT

## 2022-01-16 ASSESSMENT — PAIN SCALES - GENERAL: PAINLEVEL_OUTOF10: 8

## 2022-01-16 NOTE — ED NOTES
Assumed care of pt. Provider at the bedside to assess pt.  Pt states pain is worse than what he has had in the past.          Alessandro Gamino RN  01/16/22 [id number]

## 2022-01-16 NOTE — ED PROVIDER NOTES
Name: Charu Nelson   MRN: [id number]     --------------------------------------------- History of Present Illness ---------------------------------------------  1/16/22, Time: 11:28 AM EST   Chief Complaint   Patient presents with    Knee Pain     right, denies injury. Hx of gout. HPI    Charu Nelson is a 74 y.o. male, with hx of HIV, gout, hemarthrosis of left elbow, distant hx of DVTs (on warfarin), who presents to the ED today for nontraumatic right knee pain, which began 2 days ago. Pt states he has had some swelling and pain in the right knee. Relates pain with walking. The pain is constant, 8/10, nothing makes it better or worse. He takes probenacid daily for gout treatment. The pt denies any associated fever, HA, n/v, chest pain, shortness of breath, abd pain, GI or  complaints. Allg: Cat hair extract, Other, Seasonal, and Ultram [tramadol hcl] PCP: Richard Gama MD.    Meds:   Current Facility-Administered Medications:     HYDROcodone-acetaminophen (NORCO) 5-325 MG per tablet 1 tablet, 1 tablet, Oral, Once, Lisa Barrera, DO    Current Outpatient Medications:     HYDROcodone-acetaminophen (NORCO) 5-325 MG per tablet, Take 1 tablet by mouth every 8 hours as needed for Pain for up to 3 days. Intended supply: 3 days.  Take lowest dose possible to manage pain, Disp: 9 tablet, Rfl: 0    montelukast (SINGULAIR) 10 MG tablet, Take 1 tablet by mouth daily, Disp: 30 tablet, Rfl: 3    Loratadine-Pseudoephedrine (CLARITIN-D 12 HOUR PO), Take by mouth, Disp: , Rfl:     Aromatic Inhalants (VICKS VAPOR IN), Inhale into the lungs In a machine to help him breath, Disp: , Rfl:     Hypertonic Nasal Wash (SINUS RINSE) PACK, 1 kit by Nasal route daily, Disp: 1 kit, Rfl: 1    Sodium Chloride-Sodium Bicarb 1.57 g PACK, 1 Package by Nasal route daily, Disp: 30 each, Rfl: 2    azelastine (ASTELIN) 0.1 % nasal spray, 2 sprays by Nasal route 2 times daily Use in each nostril as directed, Disp: 3 Bottle, Rfl: 2    Diphenhydramine-Pseudoephed (BENADRYL ALLERGY/SINUS PO), Take 10 mg by mouth 3 times daily, Disp: , Rfl:     hydroCHLOROthiazide (HYDRODIURIL) 25 MG tablet, Take 25 mg by mouth daily, Disp: , Rfl:     lisinopril (PRINIVIL;ZESTRIL) 20 MG tablet, Take 20 mg by mouth daily, Disp: , Rfl:     sodium chloride (OCEAN, BABY AYR) 0.65 % nasal spray, 2 sprays by Nasal route as needed for Congestion (5 times daily), Disp: , Rfl:     fluticasone (FLONASE) 50 MCG/ACT nasal spray, 2 sprays by Each Nostril route daily, Disp: , Rfl:     tiotropium (SPIRIVA RESPIMAT) 1.25 MCG/ACT AERS inhaler, Inhale 2 puffs into the lungs daily, Disp: , Rfl:     omeprazole (PRILOSEC) 20 MG delayed release capsule, Take 20 mg by mouth daily, Disp: , Rfl:     Abacavir-Dolutegravir-Lamivud (TRIUMEQ) 600- MG TABS, Take 1 tablet by mouth nightly, Disp: , Rfl:     rosuvastatin (CRESTOR) 40 MG tablet, Take 40 mg by mouth every evening, Disp: , Rfl:     warfarin (COUMADIN) 5 MG tablet, Take 5 mg by mouth daily Takes only 2.5mg Tuesday and Thursday, sat and sun Stopped 5 days pre op Put on lovenox bridge by physician at [facility], Disp: , Rfl:      Review of Systems   Constitutional: Negative for chills, fatigue and fever. HENT: Negative for congestion, facial swelling, sinus pain and sore throat. Eyes: Negative for pain, redness and itching. Respiratory: Negative for cough and shortness of breath. Cardiovascular: Negative for chest pain and palpitations. Gastrointestinal: Negative for abdominal pain, constipation and diarrhea. Endocrine: Negative for polyuria. Genitourinary: Negative for difficulty urinating, dysuria and flank pain. Musculoskeletal: Positive for arthralgias (Rt knee) and joint swelling (Rt knee). Negative for back pain and neck pain. Skin: Negative for pallor and rash. Neurological: Negative for dizziness, syncope, weakness and headaches.    Psychiatric/Behavioral: Negative for agitation, behavioral problems, confusion and suicidal ideas. The patient is not nervous/anxious. Physical Exam  Vitals and nursing note reviewed. Constitutional:       General: He is not in acute distress. Appearance: Normal appearance. He is obese. He is not ill-appearing, toxic-appearing or diaphoretic. HENT:      Head: Normocephalic and atraumatic. Right Ear: External ear normal.      Left Ear: External ear normal.      Nose: Nose normal.      Mouth/Throat:      Mouth: Mucous membranes are moist.      Pharynx: Oropharynx is clear. Eyes:      General: No scleral icterus. Right eye: No discharge. Left eye: No discharge. Pupils: Pupils are equal, round, and reactive to light. Cardiovascular:      Rate and Rhythm: Normal rate and regular rhythm. Pulses: Normal pulses. Pulmonary:      Effort: Pulmonary effort is normal. No respiratory distress. Breath sounds: Normal breath sounds. No wheezing, rhonchi or rales. Abdominal:      General: There is no distension. Palpations: Abdomen is soft. Tenderness: There is no abdominal tenderness. There is no guarding or rebound. Musculoskeletal:         General: Swelling (Right knee) and tenderness present. No deformity. Normal range of motion. Cervical back: Normal range of motion. No tenderness. Right lower leg: No edema. Left lower leg: No edema. Skin:     General: Skin is warm and dry. Capillary Refill: Capillary refill takes less than 2 seconds. Coloration: Skin is not jaundiced or pale. Findings: No bruising, erythema, lesion or rash. Neurological:      General: No focal deficit present. Mental Status: He is alert and oriented to person, place, and time. Cranial Nerves: No cranial nerve deficit. Motor: No weakness.    Psychiatric:         Mood and Affect: Mood normal.         Behavior: Behavior normal.          Procedures     MDM  Number of Diagnoses or Management Options  Knee effusion, right  Diagnosis management comments: Mr. Geovanny Christianson is a 73 y/o M pt w/ hx of gout, on warfarin for previous DVTs, who presents today for right knee pain and swelling x3 days. On physical exam, the right knee is moderately swollen compared to the left. No erythema or pylori was present. .  Sensory, motor and pulses intact distal to the swelling. Lab work as well as x-ray of the right knee was ordered. Doppler US ordered. No DVT was present. X-ray of the right knee showed mild joint effusion. Lab work was rather unremarkable. Recommended patient to follow-up with orthopedics for further evaluation of his right knee effusion as well as his primary care doctor. Pt given norco as well as prescription for norco. Patient was given a soft knee brace, states he has walker at home. Patient was amenable to this plan. Patient was discharged home.        ED Course as of 01/16/22 1336   Sun Jan 16, 2022   1202 CBC Auto Differential(!):    WBC 8.4   RBC 4.18   Hemoglobin Quant 13.6   Hematocrit 40.1   MCV 95.9   MCH 32.5   MCHC 33.9   RDW 14.1   Platelet Count 888   MPV 9.1   Neutrophils % 73.2   Immature Granulocytes % 0.2   Lymphocyte % 14.6(!)   Monocytes % 8.7   Eosinophils % 2.7   Basophils % 0.6   Neutrophils Absolute 6.15   Immature Granulocytes # 0.02   Lymphocytes Absolute 1.23(!)   Monocytes Absolute 0.73   Eosinophils Absolute 0.23   Basophils Absolute 0.05  Unremarkable [PW]   1203 XR KNEE RIGHT (3 VIEWS)  Small joint effusion present [PW]      ED Course User Index  [PW] Kylie Zambrano DO          ED Course as of 01/16/22 1336   Sun Jan 16, 2022   1202 CBC Auto Differential(!):    WBC 8.4   RBC 4.18   Hemoglobin Quant 13.6   Hematocrit 40.1   MCV 95.9   MCH 32.5   MCHC 33.9   RDW 14.1   Platelet Count 452   MPV 9.1   Neutrophils % 73.2   Immature Granulocytes % 0.2   Lymphocyte % 14.6(!)   Monocytes % 8.7   Eosinophils % 2.7   Basophils % 0.6   Neutrophils Absolute 6.15 Immature Granulocytes # 0.02   Lymphocytes Absolute 1.23(!)   Monocytes Absolute 0.73   Eosinophils Absolute 0.23   Basophils Absolute 0.05  Unremarkable [PW]   1203 XR KNEE RIGHT (3 VIEWS)  Small joint effusion present [PW]      ED Course User Index  [PW] Lisa Li DO       --------------------------------------------- PAST HISTORY ---------------------------------------------  Past Medical History:  has a past medical history of COPD [facility]chronic obstructive [facility] D[facility] of lower extremity (deep venous thrombosis) [facility] GERD (gastroesophageal reflux disease), HIV (human immunodeficiency virus infection) ([facility] Hyperlipidemia, Hypertension, and Prostate enlargement. Past Surgical History:  has a past surgical history that includes Abdomen surgery (1970); lymphadenectomy (Left); Colonoscopy; Foot surgery (Bilateral); Ankle surgery (Right); hernia repair; Heel spur surgery; other surgical history (Left, 03/22/2019); and Hammer toe surgery (Left, 3/22/2019). Social History:  reports that he quit smoking about 3 years ago. His smoking use included cigarettes. He quit after 40.00 years of use. He has never used smokeless tobacco. He reports that he does not drink alcohol and does not use drugs. Family History: family history includes Breast Cancer in his mother; Colon Cancer in his mother; Diabetes in his father; Heart Disease in his father; Kathrine Fill in his brother; Stroke in his father. The patients home medications have been reviewed.     Allergies: Cat hair extract, Other, Seasonal, and Ultram [tramadol hcl]    -------------------------------------------------- RESULTS -------------------------------------------------  Labs:  Results for orders placed or performed during the hospital encounter of 01/16/22   Protime-INR   Result Value Ref Range    Protime 31.2 (H) 9.3 - 12.4 sec    INR 2.6    CBC Auto Differential   Result Value Ref Range    WBC 8.4 4.5 - 11.5 E9/L    RBC 4.18 3.80 - 5.80 E12/L    Hemoglobin 13.6 12.5 - 16.5 g/dL    Hematocrit 40.1 37.0 - 54.0 %    MCV 95.9 80.0 - 99.9 fL    MCH 32.5 26.0 - 35.0 pg    MCHC 33.9 32.0 - 34.5 %    RDW 14.1 11.5 - 15.0 fL    Platelets 567 677 - 429 E9/L    MPV 9.1 7.0 - 12.0 fL    Neutrophils % 73.2 43.0 - 80.0 %    Immature Granulocytes % 0.2 0.0 - 5.0 %    Lymphocytes % 14.6 (L) 20.0 - 42.0 %    Monocytes % 8.7 2.0 - 12.0 %    Eosinophils % 2.7 0.0 - 6.0 %    Basophils % 0.6 0.0 - 2.0 %    Neutrophils Absolute 6.15 1.80 - 7.30 E9/L    Immature Granulocytes # 0.02 E9/L    Lymphocytes Absolute 1.23 (L) 1.50 - 4.00 E9/L    Monocytes Absolute 0.73 0.10 - 0.95 E9/L    Eosinophils Absolute 0.23 0.05 - 0.50 E9/L    Basophils Absolute 0.05 0.00 - 0.20 D9/V   Basic Metabolic Panel w/ Reflex to MG   Result Value Ref Range    Sodium 135 132 - 146 mmol/L    Potassium reflex Magnesium 4.2 3.5 - 5.0 mmol/L    Chloride 102 98 - 107 mmol/L    CO2 25 22 - 29 mmol/L    Anion Gap 8 7 - 16 mmol/L    Glucose 106 (H) 74 - 99 mg/dL    BUN 22 6 - 23 mg/dL    CREATININE 1.5 (H) 0.7 - 1.2 mg/dL    GFR Non-African American 47 >=60 mL/min/1.73    GFR African American 56     Calcium 9.2 8.6 - 10.2 mg/dL       Radiology:  US DUP LOWER EXTREMITY RIGHT [id number]   Final Result   No evidence of DVT in the right lower extremity. RECOMMENDATIONS:   Unavailable         XR KNEE RIGHT (3 VIEWS)   Final Result   1. There is no fracture dislocation of the right knee   2. Small joint effusion             ------------------------- NURSING NOTES AND VITALS REVIEWED ---------------------------  Date / Time Roomed:  1/16/2022 11:04 AM  ED Bed Assignment:  19/19    The nursing notes within the ED encounter and vital signs as below have been reviewed.    /72   Pulse 88   Temp 98.1 °F (36.7 °C) (Oral)   Resp 16   Ht 6' 6\" (1.981 m)   Wt 290 lb (131.5 kg)   SpO2 96%   BMI 33.51 kg/m²   Oxygen Saturation Interpretation: Normal      ------------------------------------------ PROGRESS NOTES ------------------------------------------  1:12 PM EST  I have spoken with the patient and discussed todays results, in addition to providing specific details for the plan of care and counseling regarding the diagnosis and prognosis. Their questions are answered at this time and they are agreeable with the plan. I discussed at length with them reasons for immediate return here for re evaluation. They will followup with their primary care physician and orthopedic physician by calling their office tomorrow. --------------------------------- ADDITIONAL PROVIDER NOTES ---------------------------------  At this time the patient is without objective evidence of an acute process requiring hospitalization or inpatient management. They have remained hemodynamically stable throughout their entire ED visit and are stable for discharge with outpatient follow-up. The plan has been discussed in detail and they are aware of the specific conditions for emergent return, as well as the importance of follow-up. New Prescriptions    HYDROCODONE-ACETAMINOPHEN (NORCO) 5-325 MG PER TABLET    Take 1 tablet by mouth every 8 hours as needed for Pain for up to 3 days. Intended supply: 3 days. Take lowest dose possible to manage pain       Diagnosis:  1. Knee effusion, right        Disposition:  Patient's disposition: Discharge to home  Patient's condition is stable.      Trevor Brothers,   Resident  01/16/22 [facility], DO  Resident  01/16/22 [id number]

## 2022-01-16 NOTE — ED NOTES
1115 pt off the floor for x-ray and ultrasound. 1200 Pt back to room 19. Labs collected and sent. 1300 Pt to be d/c home. Pt states he walks with a walker, lives at home alone. [id number] Provider made aware pt unable to use crutches. [id number] Provider at the bedside to update pt on plan of care. Pt to be medicated for pain and will have family or friend pick him up.           Xochilt Manriquez RN  01/16/22 1846

## 2022-01-20 ASSESSMENT — ENCOUNTER SYMPTOMS
VOMITING: 0
COUGH: 0
SHORTNESS OF BREATH: 0

## 2022-01-20 NOTE — PROGRESS NOTES
[facility] Otolaryngology  Dr. Morro Mera. Jomar Rodriguez D.O. Ms.Ed  Post-Op Follow Up        Patient Name:  Kip Babinski  :  1955     CHIEF C/O:    Chief Complaint   Patient presents with    Post-Op Check     1 week p/o after clarifix  patient state he still feels a little stuffy       HISTORY OBTAINED FROM:  patient    HISTORY OF PRESENT ILLNESS:       Angelo Portillo is a 74y.o. year old male, here today for follow up of his post intranasal lesion of distraction with Clarifix device for vasomotor rhinitis. Patient is noting an already improvement in his nasal congestion and drainage, without any epistaxis headaches or vision changes. Review of Systems   Constitutional: Negative for chills and fever. HENT: Positive for congestion. Negative for ear discharge and hearing loss. Respiratory: Negative for cough and shortness of breath. Cardiovascular: Negative for chest pain and palpitations. Gastrointestinal: Negative for vomiting. Skin: Negative for rash. Allergic/Immunologic: Negative for environmental allergies. Neurological: Negative for dizziness and headaches. Hematological: Does not bruise/bleed easily. All other systems reviewed and are negative. Ht 6' 6\" (1.981 m)   Wt 300 lb (136.1 kg)   BMI 34.67 kg/m²   Physical Exam  Vitals and nursing note reviewed. Constitutional:       Appearance: He is well-developed. HENT:      Head: Normocephalic and atraumatic. Right Ear: Tympanic membrane and ear canal normal.      Left Ear: Tympanic membrane and ear canal normal.      Nose: Congestion and rhinorrhea present. Eyes:      Pupils: Pupils are equal, round, and reactive to light. Neck:      Thyroid: No thyromegaly. Trachea: No tracheal deviation. Cardiovascular:      Rate and Rhythm: Normal rate. Pulmonary:      Effort: Pulmonary effort is normal. No respiratory distress. Musculoskeletal:         General: Normal range of motion.       Cervical back: Normal range of motion. Lymphadenopathy:      Cervical: No cervical adenopathy. Skin:     General: Skin is warm. Findings: No erythema. Neurological:      Mental Status: He is alert. Cranial Nerves: No cranial nerve deficit. IMPRESSION/PLAN:  Status post intranasal lesion destruction with Clarifix device continuing to improve recommend plain nasal saline rinses and follow-up in 6 weeks for reevaluation. Dr. Concetta Gamino Otolaryngology/Facial Plastic Surgery Residency  Associate Clinical Professor:  Chuck Mai, [facility]

## 2022-02-02 ENCOUNTER — OFFICE VISIT (OUTPATIENT)
Dept: ORTHOPEDIC SURGERY | Age: 67
End: 2022-02-02
Payer: OTHER GOVERNMENT

## 2022-02-02 VITALS — BODY MASS INDEX: 33.55 KG/M2 | WEIGHT: 290 LBS | HEIGHT: 78 IN

## 2022-02-02 DIAGNOSIS — S83.206A TEAR OF MENISCUS OF RIGHT KNEE, UNSPECIFIED MENISCUS, UNSPECIFIED TEAR TYPE, UNSPECIFIED WHETHER OLD OR CURRENT TEAR: Primary | ICD-10-CM

## 2022-02-02 PROCEDURE — G8417 CALC BMI ABV UP PARAM F/U: HCPCS | Performed by: ORTHOPAEDIC SURGERY

## 2022-02-02 PROCEDURE — 1123F ACP DISCUSS/DSCN MKR DOCD: CPT | Performed by: ORTHOPAEDIC SURGERY

## 2022-02-02 PROCEDURE — 99203 OFFICE O/P NEW LOW 30 MIN: CPT | Performed by: ORTHOPAEDIC SURGERY

## 2022-02-02 PROCEDURE — 1036F TOBACCO NON-USER: CPT | Performed by: ORTHOPAEDIC SURGERY

## 2022-02-02 PROCEDURE — G8484 FLU IMMUNIZE NO ADMIN: HCPCS | Performed by: ORTHOPAEDIC SURGERY

## 2022-02-02 PROCEDURE — 3017F COLORECTAL CA SCREEN DOC REV: CPT | Performed by: ORTHOPAEDIC SURGERY

## 2022-02-02 PROCEDURE — 4040F PNEUMOC VAC/ADMIN/RCVD: CPT | Performed by: ORTHOPAEDIC SURGERY

## 2022-02-02 PROCEDURE — G8427 DOCREV CUR MEDS BY ELIG CLIN: HCPCS | Performed by: ORTHOPAEDIC SURGERY

## 2022-02-02 NOTE — PROGRESS NOTES
(SINGULAIR) 10 MG tablet, Take 1 tablet by mouth daily, Disp: 30 tablet, Rfl: 3    Loratadine-Pseudoephedrine (CLARITIN-D 12 HOUR PO), Take by mouth, Disp: , Rfl:     Aromatic Inhalants (VICKS VAPOR IN), Inhale into the lungs In a machine to help him breath, Disp: , Rfl:     Hypertonic Nasal Wash (SINUS RINSE) PACK, 1 kit by Nasal route daily, Disp: 1 kit, Rfl: 1    Sodium Chloride-Sodium Bicarb 1.57 g PACK, 1 Package by Nasal route daily, Disp: 30 each, Rfl: 2    azelastine (ASTELIN) 0.1 % nasal spray, 2 sprays by Nasal route 2 times daily Use in each nostril as directed, Disp: 3 Bottle, Rfl: 2    Diphenhydramine-Pseudoephed (BENADRYL ALLERGY/SINUS PO), Take 10 mg by mouth 3 times daily, Disp: , Rfl:     hydroCHLOROthiazide (HYDRODIURIL) 25 MG tablet, Take 25 mg by mouth daily, Disp: , Rfl:     lisinopril (PRINIVIL;ZESTRIL) 20 MG tablet, Take 20 mg by mouth daily, Disp: , Rfl:     sodium chloride (OCEAN, BABY AYR) 0.65 % nasal spray, 2 sprays by Nasal route as needed for Congestion (5 times daily), Disp: , Rfl:     fluticasone (FLONASE) 50 MCG/ACT nasal spray, 2 sprays by Each Nostril route daily, Disp: , Rfl:     tiotropium (SPIRIVA RESPIMAT) 1.25 MCG/ACT AERS inhaler, Inhale 2 puffs into the lungs daily, Disp: , Rfl:     omeprazole (PRILOSEC) 20 MG delayed release capsule, Take 20 mg by mouth daily, Disp: , Rfl:     Abacavir-Dolutegravir-Lamivud (TRIUMEQ) 600- MG TABS, Take 1 tablet by mouth nightly, Disp: , Rfl:     rosuvastatin (CRESTOR) 40 MG tablet, Take 40 mg by mouth every evening, Disp: , Rfl:     warfarin (COUMADIN) 5 MG tablet, Take 5 mg by mouth daily Takes only 2.5mg Tuesday and Thursday, sat and sun Stopped 5 days pre op Put on lovenox bridge by physician at [place] [facility], Disp: , Rfl:   Allergies   Allergen Reactions    Cat Hair Extract     Other      Inside birds    Seasonal     Ultram [Tramadol Hcl] Itching     Social History     Socioeconomic History    Marital status: Single Spouse name: Not on file    Number of children: Not on file    Years of education: Not on file    Highest education level: Not on file   Occupational History    Not on file   Tobacco Use    Smoking status: Former Smoker     Years: 40.00     Types: Cigarettes     Quit date: 1/1/2019     Years since quitting: 3.0    Smokeless tobacco: Never Used   Substance and Sexual Activity    Alcohol use: No    Drug use: No    Sexual activity: Never   Other Topics Concern    Not on file   Social History Narrative    Not on file     Social Determinants of Health     Financial Resource Strain:     Difficulty of Paying Living Expenses: Not on file   Food Insecurity:     Worried About Running Out of Food in the Last Year: Not on file    Rebekah of Food in the Last Year: Not on file   Transportation Needs:     Lack of Transportation (Medical): Not on file    Lack of Transportation (Non-Medical):  Not on file   Physical Activity:     Days of Exercise per Week: Not on file    Minutes of Exercise per Session: Not on file   Stress:     Feeling of Stress : Not on file   Social Connections:     Frequency of Communication with Friends and Family: Not on file    Frequency of Social Gatherings with Friends and Family: Not on file    Attends Worship Services: Not on file    Active Member of [address] [place] [address] [employer] or Organizations: Not on file    Attends Club or Organization Meetings: Not on file    Marital Status: Not on file   Intimate Partner Violence:     Fear of Current or Ex-Partner: Not on file    Emotionally Abused: Not on file    Physically Abused: Not on file    Sexually Abused: Not on file   Housing Stability:     Unable to Pay for Housing in the Last Year: Not on file    Number of [place] in the Last Year: Not on file    Unstable Housing in the Last Year: Not on file     Family History   Problem Relation Age of Onset    Breast Cancer Mother     Colon Cancer Mother     Heart Disease Father     Stroke Father  Diabetes Father     Lung Cancer Brother            Physical Exam:    Ht 6' 6\" (1.981 m)   Wt 290 lb (131.5 kg)   BMI 33.51 kg/m²     GENERAL: alert, appears stated age, cooperative, no acute distress    HEENT: Head is normocephalic, atraumatic. PERRLA. SKIN: Clean, dry, intact. There is not any cellulitis or cutaneous lesions noted in the lower extremities except noted in MSK    PULMONARY: breathing is regular and unlabored, no acute distress    CV: The bilateral upper and lower extremities are warm and well-perfused with brisk capillary refill. 2+ pulses UE and LE bilateral.     PSYCHIATRY: Pleasant mood, appropriate behavior, follows commands    NEURO: Sensation is intact distally with light touch with no alteration. Motor exam of the lower extremities show quadriceps, hamstrings, foot dorsiflexion and plantarflexion grossly intact 5/5. LYMPH: No lymphedema present distally in upper or lower extremity. MUSCULOSKELETAL:    Right Knee Exam:    mild effusion noted. No erythema/induration/fluctuance. Posterior medial joint line TTP. Stable to varus and valgus at 0 and 30 degrees of flexion. Negative Lachman's and posterior drawer. Negative patellar grind test and J sign. Compartments soft and compressible throughout leg. Active range of motion 0-120 with pain. Positive Edgar's positive Apley's, gait is antalgic        Imaging:  XR KNEE RIGHT (3 VIEWS)    Result Date: 1/16/2022  EXAMINATION: THREE XRAY VIEWS OF THE RIGHT KNEE 1/16/2022 11:16 am COMPARISON: None. HISTORY: ORDERING SYSTEM PROVIDED HISTORY: nontraumtic right knee swelling TECHNOLOGIST PROVIDED HISTORY: Reason for exam:->nontraumtic right knee swelling FINDINGS: There is no fracture dislocation of the right knee. There is no osseous lesion. There is a small joint effusion. 1. There is no fracture dislocation of the right knee 2.  Small joint effusion     US DUP LOWER EXTREMITY RIGHT KHUSHBOO    Result Date: 1/16/2022  EXAMINATION: DUPLEX VENOUS ULTRASOUND OF THE RIGHT LOWER EXTREMITY 1/16/2022 11:27 am TECHNIQUE: Duplex ultrasound using B-mode/gray scaled imaging and Doppler spectral analysis and color flow was obtained of the deep venous structures of the right lower extremity. COMPARISON: 03/11/2020 HISTORY: ORDERING SYSTEM PROVIDED HISTORY: evaluate for DVT TECHNOLOGIST PROVIDED HISTORY: Reason for exam:->evaluate for DVT What reading provider will be dictating this exam?->CRC FINDINGS: The visualized veins of the right lower extremity are patent and free of echogenic thrombus. The veins demonstrate good compressibility with normal color flow study and spectral analysis. Again noted is an echogenic focus within the right common femoral vein. This may represent a cast from a boat deep vein thrombotic disease or prior catheter placement. No evidence of DVT in the right lower extremity. RECOMMENDATIONS: Inés Rhodes was seen today for knee pain. Diagnoses and all orders for this visit:    Tear of meniscus of right knee, unspecified meniscus, unspecified tear type, unspecified whether old or current tear  -     MRI KNEE RIGHT WO CONTRAST; Future        Patient seen and examined. X-rays reviewed. Patient has little to no arthritis noted on x-rays today. However patient's main complaint is instability of symptomatic knee. Exam and history is consistent with possible meniscus tear. MRI recommended for further evaluation management.   Follow-up after MRI        Fran Corral DO  2/2/22

## 2022-02-16 ENCOUNTER — OFFICE VISIT (OUTPATIENT)
Dept: ORTHOPEDIC SURGERY | Age: 67
End: 2022-02-16
Payer: OTHER GOVERNMENT

## 2022-02-16 VITALS — BODY MASS INDEX: 33.55 KG/M2 | HEIGHT: 78 IN | TEMPERATURE: 98 F | WEIGHT: 290 LBS

## 2022-02-16 DIAGNOSIS — S83.206A TEAR OF MENISCUS OF RIGHT KNEE, UNSPECIFIED MENISCUS, UNSPECIFIED TEAR TYPE, UNSPECIFIED WHETHER OLD OR CURRENT TEAR: ICD-10-CM

## 2022-02-16 DIAGNOSIS — Z71.82 EXERCISE COUNSELING: ICD-10-CM

## 2022-02-16 DIAGNOSIS — M17.10 PATELLOFEMORAL ARTHRITIS: Primary | ICD-10-CM

## 2022-02-16 PROCEDURE — 1036F TOBACCO NON-USER: CPT | Performed by: ORTHOPAEDIC SURGERY

## 2022-02-16 PROCEDURE — 1123F ACP DISCUSS/DSCN MKR DOCD: CPT | Performed by: ORTHOPAEDIC SURGERY

## 2022-02-16 PROCEDURE — 3017F COLORECTAL CA SCREEN DOC REV: CPT | Performed by: ORTHOPAEDIC SURGERY

## 2022-02-16 PROCEDURE — 20610 DRAIN/INJ JOINT/BURSA W/O US: CPT | Performed by: ORTHOPAEDIC SURGERY

## 2022-02-16 PROCEDURE — 99214 OFFICE O/P EST MOD 30 MIN: CPT | Performed by: ORTHOPAEDIC SURGERY

## 2022-02-16 PROCEDURE — 4040F PNEUMOC VAC/ADMIN/RCVD: CPT | Performed by: ORTHOPAEDIC SURGERY

## 2022-02-16 PROCEDURE — G8427 DOCREV CUR MEDS BY ELIG CLIN: HCPCS | Performed by: ORTHOPAEDIC SURGERY

## 2022-02-16 PROCEDURE — G8417 CALC BMI ABV UP PARAM F/U: HCPCS | Performed by: ORTHOPAEDIC SURGERY

## 2022-02-16 PROCEDURE — G8484 FLU IMMUNIZE NO ADMIN: HCPCS | Performed by: ORTHOPAEDIC SURGERY

## 2022-02-16 RX ORDER — TRIAMCINOLONE ACETONIDE 40 MG/ML
40 INJECTION, SUSPENSION INTRA-ARTICULAR; INTRAMUSCULAR ONCE
Status: COMPLETED | OUTPATIENT
Start: 2022-02-16 | End: 2022-02-16

## 2022-02-16 RX ADMIN — TRIAMCINOLONE ACETONIDE 40 MG: 40 INJECTION, SUSPENSION INTRA-ARTICULAR; INTRAMUSCULAR at 14:46

## 2022-02-16 NOTE — PROGRESS NOTES
Chief Complaint   Patient presents with    Knee Pain     Right Knee MRI F/U         HPI:    Patient is 74 y.o. male complaining chronic, atraumatic, insidious onset right knee pain for 2 weeks. He admits to stiffness, deep, aching pain, swelling, difficulty with stairs and ambulating far distances. He admits to gross instability specifically in his right knee. Previous treatments include rest, ice, and anti-inflammatory medication and HEP without much relief. Follows up after MRI. ROS:    Skin: (-) rash,(-) psoriasis,(-) eczema, (-)skin cancer. Neurologic: (-)numbness, (-)tingling, (-)headaches, (-) LOC. Cardiovascular: (-) Chest pain, (-) swelling in legs/feet, (-) SOB, (-) cramping in legs/feet with walking.     All other review of systems negative except stated above or in HPI      Past Medical History:   Diagnosis Date    [facility]     DVT of lower extremity (deep venous thrombosis) ([facility] 2007    post foot surgery      GERD (gastroesophageal reflux disease)     HIV (human immunodeficiency virus infection) ([facility]     Hyperlipidemia     Hypertension     Prostate enlargement      Past Surgical History:   Procedure Laterality Date    ABDOMEN SURGERY  1970    colon resection d/t paralytic ileus from blunt trauma to abdomen     ANKLE SURGERY Right     COLONOSCOPY      FOOT SURGERY Bilateral     hammertoe    HAMMER TOE SURGERY Left 3/22/2019    CONDYLECTOMY 4TH TOE HAMMERTOE CORRECTION  5TH TOE LEFT FOOT. V TO Y SKIN PLASTY 5TH METATARSAL PHALANGEAL JOINT LEFT FOOT performed by Laurie Basurto., SUZANNA at [address]      LYMPHADENECTOMY Left     axilla & Rt side neck    OTHER SURGICAL HISTORY Left 03/22/2019    hammertoe correction 4th and 5th toes left foot, skin plasty 5th MPJ left foot       Current Outpatient Medications:     apixaban (ELIQUIS) 5 MG TABS tablet, TAKE ONE TABLET BY MOUTH TWICE A DAY AS [facility] ([phone] [facility]), Disp: , Rfl:     montelukast (SINGULAIR) 10 MG tablet, Take 1 tablet by mouth daily, Disp: 30 tablet, Rfl: 3    Loratadine-Pseudoephedrine (CLARITIN-D 12 HOUR PO), Take by mouth, Disp: , Rfl:     Aromatic Inhalants (VICKS VAPOR IN), Inhale into the lungs In a machine to help him breath, Disp: , Rfl:     Hypertonic Nasal Wash (SINUS RINSE) PACK, 1 kit by Nasal route daily, Disp: 1 kit, Rfl: 1    azelastine (ASTELIN) 0.1 % nasal spray, 2 sprays by Nasal route 2 times daily Use in each nostril as directed, Disp: 3 Bottle, Rfl: 2    Diphenhydramine-Pseudoephed (BENADRYL ALLERGY/SINUS PO), Take 10 mg by mouth 3 times daily, Disp: , Rfl:     hydroCHLOROthiazide (HYDRODIURIL) 25 MG tablet, Take 25 mg by mouth daily, Disp: , Rfl:     lisinopril (PRINIVIL;ZESTRIL) 20 MG tablet, Take 20 mg by mouth daily, Disp: , Rfl:     sodium chloride (OCEAN, BABY AYR) 0.65 % nasal spray, 2 sprays by Nasal route as needed for Congestion (5 times daily), Disp: , Rfl:     fluticasone (FLONASE) 50 MCG/ACT nasal spray, 2 sprays by Each Nostril route daily, Disp: , Rfl:     tiotropium (SPIRIVA RESPIMAT) 1.25 MCG/ACT AERS inhaler, Inhale 2 puffs into the lungs daily, Disp: , Rfl:     omeprazole (PRILOSEC) 20 MG delayed release capsule, Take 20 mg by mouth daily, Disp: , Rfl:     Abacavir-Dolutegravir-Lamivud (TRIUMEQ) 600- MG TABS, Take 1 tablet by mouth nightly, Disp: , Rfl:     rosuvastatin (CRESTOR) 40 MG tablet, Take 40 mg by mouth every evening, Disp: , Rfl:     Sodium Chloride-Sodium Bicarb 1.57 g PACK, 1 Package by Nasal route daily, Disp: 30 each, Rfl: 2  Allergies   Allergen Reactions    Cat Hair Extract     Other      Inside birds    Seasonal     Ultram [Tramadol Hcl] Itching     Social History     Socioeconomic History    Marital status: Single     Spouse name: Not on file    Number of children: Not on file    Years of education: Not on file    Highest education level: Not on file   Occupational History    Not on file   Tobacco Use    Smoking status: Former Smoker     Years: 40.00     Types: Cigarettes     Quit date: 1/1/2019     Years since quitting: 3.1    Smokeless tobacco: Never Used   Substance and Sexual Activity    Alcohol use: No    Drug use: No    Sexual activity: Never   Other Topics Concern    Not on file   Social History Narrative    Not on file     Social Determinants of Health     Financial Resource Strain:     Difficulty of Paying Living Expenses: Not on file   Food Insecurity:     Worried About Running Out of Food in the Last Year: Not on file    Rebekah of Food in the Last Year: Not on file   Transportation Needs:     Lack of Transportation (Medical): Not on file    Lack of Transportation (Non-Medical):  Not on file   Physical Activity:     Days of Exercise per Week: Not on file    Minutes of Exercise per Session: Not on file   Stress:     Feeling of Stress : Not on file   Social Connections:     Frequency of Communication with Friends and Family: Not on file    Frequency of Social Gatherings with Friends and Family: Not on file    Attends [employer] Services: Not on file    Active Member of [address] [place] [address] [employer] or Organizations: Not on file    Attends Club or Organization Meetings: Not on file    Marital Status: Not on file   Intimate Partner Violence:     Fear of Current or Ex-Partner: Not on file    Emotionally Abused: Not on file    Physically Abused: Not on file    Sexually Abused: Not on file   Housing Stability:     Unable to Pay for Housing in the Last Year: Not on file    Number of [place] in the Last Year: Not on file    Unstable Housing in the Last Year: Not on file     Family History   Problem Relation Age of Onset    Breast Cancer Mother     Colon Cancer Mother     Heart Disease Father     Stroke Father     Diabetes Father     Lung Cancer Brother            Physical Exam:    Temp 98 °F (36.7 °C)   Ht 6' 6\" (1.981 m)   Wt 290 lb (131.5 kg)   BMI 33.51 kg/m²     GENERAL: alert, appears stated age, cooperative, no acute distress    HEENT: Head is normocephalic, atraumatic. PERRLA. SKIN: Clean, dry, intact. There is not any cellulitis or cutaneous lesions noted in the lower extremities except noted in MSK    PULMONARY: breathing is regular and unlabored, no acute distress    CV: The bilateral upper and lower extremities are warm and well-perfused with brisk capillary refill. 2+ pulses UE and LE bilateral.     PSYCHIATRY: Pleasant mood, appropriate behavior, follows commands    NEURO: Sensation is intact distally with light touch with no alteration. Motor exam of the lower extremities show quadriceps, hamstrings, foot dorsiflexion and plantarflexion grossly intact 5/5. LYMPH: No lymphedema present distally in upper or lower extremity. MUSCULOSKELETAL:    Right Knee Exam:    mild effusion noted. No erythema/induration/fluctuance. Posterior medial joint line TTP. Stable to varus and valgus at 0 and 30 degrees of flexion. Negative Lachman's and posterior drawer. Negative patellar grind test and J sign. Compartments soft and compressible throughout leg. Active range of motion 0-120 with pain. Positive Edgar's positive Apley's, gait is antalgic    no change since last visit. Imaging:  XR KNEE RIGHT (3 VIEWS)    Result Date: 1/16/2022  EXAMINATION: THREE XRAY VIEWS OF THE RIGHT KNEE 1/16/2022 11:16 am COMPARISON: None. HISTORY: ORDERING SYSTEM PROVIDED HISTORY: nontraumtic right knee swelling TECHNOLOGIST PROVIDED HISTORY: Reason for exam:->nontraumtic right knee swelling FINDINGS: There is no fracture dislocation of the right knee. There is no osseous lesion. There is a small joint effusion. 1. There is no fracture dislocation of the right knee 2.  Small joint effusion     US DUP LOWER EXTREMITY RIGHT KHUSHBOO    Result Date: 1/16/2022  EXAMINATION: DUPLEX VENOUS ULTRASOUND OF THE RIGHT LOWER EXTREMITY 1/16/2022 11:27 am TECHNIQUE: Duplex ultrasound using B-mode/gray scaled imaging and Doppler spectral analysis and color flow was obtained of the deep venous structures of the right lower extremity. COMPARISON: 03/11/2020 HISTORY: ORDERING SYSTEM PROVIDED HISTORY: evaluate for DVT TECHNOLOGIST PROVIDED HISTORY: Reason for exam:->evaluate for DVT What reading provider will be dictating this exam?->CRC FINDINGS: The visualized veins of the right lower extremity are patent and free of echogenic thrombus. The veins demonstrate good compressibility with normal color flow study and spectral analysis. Again noted is an echogenic focus within the right common femoral vein. This may represent a cast from a boat deep vein thrombotic disease or prior catheter placement. No evidence of DVT in the right lower extremity. RECOMMENDATIONS: Unavailable     MRI KNEE RIGHT WO CONTRAST    Result Date: 2/11/2022  EXAMINATION: MRI OF THE RIGHT KNEE WITHOUT CONTRAST, 2/11/2022 7:06 am TECHNIQUE: Multiplanar multisequence MRI of the right knee was performed without the administration of intravenous contrast. COMPARISON: None. HISTORY: ORDERING SYSTEM PROVIDED HISTORY: Tear of meniscus of right knee, unspecified meniscus, unspecified tear type, unspecified whether old or current tear FINDINGS: MENISCI: Some intrasubstance signal within the posterior horn of the medial meniscus without meeting criteria for intra-articular surface tear. Abnormal signal at the anterior horn root junction of the medial meniscus with some anteriorly extruded fragments best seen on page 17 of series 6 favoring a complex tear. Vascularity at the anterior root is felt less likely. Grossly intact lateral meniscus. CRUCIATE LIGAMENTS: Intact anterior cruciate and posterior cruciate ligaments. EXTENSOR MECHANISM: Intact quadriceps and patellar tendons. Intact patellar retinacula.  LATERAL COLLATERAL LIGAMENT COMPLEX: Intact IT band, lateral collateral ligament proper, biceps femoris tendon and popliteus tendon. MEDIAL COLLATERAL LIGAMENT COMPLEX: The superficial and deep components of the medial collateral ligament are intact. KNEE JOINT: Moderate joint effusion. Low-grade chondromalacia patella with some heterogeneous cartilage surface irregularity. Small chronic appearing osteochondral lesion at the anterolateral femoral condyle with small subcortical cyst. BONE MARROW: No evidence of fracture. Normal marrow signal. Benign-appearing chondroid lesion at the lateral tibia favoring enchondroma. No aggressive features. Complex tear of the anterior horn root junction of the medial meniscus. Low-grade chondromalacia patella and small chronic appearing osteochondral lesion at the anterolateral femoral condyle. Moderate joint effusion. RECOMMENDATIONS: Shamir Bland was seen today for knee pain. Diagnoses and all orders for this visit:    Patellofemoral arthritis  -     AL ARTHROCENTESIS ASPIR&/INJ MAJOR JT/BURSA W/O US    Tear of meniscus of right knee, unspecified meniscus, unspecified tear type, unspecified whether old or current tear    Exercise counseling    Other orders  -     triamcinolone acetonide (KENALOG-40) injection 40 mg        Patient seen and examined. X-rays reviewed. Patient has little to no arthritis noted on x-rays today. However patient's main complaint is instability of symptomatic knee. Exam and history is consistent with possible meniscus tear. MRI recommended for further evaluation management. Follow-up after MRI    MRI reviewed with patient in detail. Natural history and course discussed with patient in long discussion  Treatment options discussed with patient in detail including risks and benefits. Patient should do well with conservative management as patient would like to avoid surgery at this time. Procedure Note Knee Cortisone Injection    The Right knee was identified as the injection site.  The risk and benefits of a cortisone injection were explained and the patient consented to the injection. Under sterile conditions, the knee was injected with a mixture of 40 mg of Kenalog and Marcaine without complication. A sterile bandage was applied. In a 15 minute assessment and discussion, patient was counseled on weight loss, healthy diet, and physical activity relating to this condition. He was educated with options in detail including nutrition, joining a health club/ weight loss program, and use of cardio equipment such as the Arc Trainer and the importance of use as well as range of motion and HEP exercises for weight loss and general health. Tish Cranker, DO          25 minutes was spent with patient. 50% or greater was spent counseling the patient.

## 2022-02-17 ENCOUNTER — TELEPHONE (OUTPATIENT)
Dept: ORTHOPEDIC SURGERY | Age: 67
End: 2022-02-17

## 2022-02-17 NOTE — TELEPHONE ENCOUNTER
Spoke to Juana JIMENEZ to verify that it is okay to use the sidney pain cream and she said the ingredients would not effect the eliquis and is okay to use. It will not effect anything.

## 2022-02-25 ENCOUNTER — TELEPHONE (OUTPATIENT)
Dept: ENT CLINIC | Age: 67
End: 2022-02-25

## 2022-02-25 NOTE — TELEPHONE ENCOUNTER
Same day cancellation due to weather.  2/25/22 for 6 wk f/u after clarifix Please advise patient for follow up recommendations [phone]

## 2022-03-09 ENCOUNTER — OFFICE VISIT (OUTPATIENT)
Dept: ENT CLINIC | Age: 67
End: 2022-03-09
Payer: COMMERCIAL

## 2022-03-09 VITALS
HEIGHT: 78 IN | SYSTOLIC BLOOD PRESSURE: 128 MMHG | BODY MASS INDEX: 33.9 KG/M2 | DIASTOLIC BLOOD PRESSURE: 70 MMHG | WEIGHT: 293 LBS | HEART RATE: 43 BPM

## 2022-03-09 DIAGNOSIS — D35.2 PITUITARY ADENOMA (HCC): ICD-10-CM

## 2022-03-09 DIAGNOSIS — J34.2 DNS (DEVIATED NASAL SEPTUM): ICD-10-CM

## 2022-03-09 DIAGNOSIS — J30.0 VASOMOTOR RHINITIS: Primary | ICD-10-CM

## 2022-03-09 DIAGNOSIS — J34.3 HYPERTROPHY OF BOTH INFERIOR NASAL TURBINATES: ICD-10-CM

## 2022-03-09 PROCEDURE — G8484 FLU IMMUNIZE NO ADMIN: HCPCS | Performed by: OTOLARYNGOLOGY

## 2022-03-09 PROCEDURE — G8427 DOCREV CUR MEDS BY ELIG CLIN: HCPCS | Performed by: OTOLARYNGOLOGY

## 2022-03-09 PROCEDURE — 99024 POSTOP FOLLOW-UP VISIT: CPT | Performed by: OTOLARYNGOLOGY

## 2022-03-09 PROCEDURE — 4040F PNEUMOC VAC/ADMIN/RCVD: CPT | Performed by: OTOLARYNGOLOGY

## 2022-03-09 PROCEDURE — 1036F TOBACCO NON-USER: CPT | Performed by: OTOLARYNGOLOGY

## 2022-03-09 PROCEDURE — G8417 CALC BMI ABV UP PARAM F/U: HCPCS | Performed by: OTOLARYNGOLOGY

## 2022-03-09 PROCEDURE — 1123F ACP DISCUSS/DSCN MKR DOCD: CPT | Performed by: OTOLARYNGOLOGY

## 2022-03-09 PROCEDURE — 3017F COLORECTAL CA SCREEN DOC REV: CPT | Performed by: OTOLARYNGOLOGY

## 2022-03-09 NOTE — PROGRESS NOTES
[facility]olaryngology  Dr. Merlin Bourgeois. Michael Hi. Ms.Ed        Patient Name:  Gabriel Lombardo  :  1955     CHIEF C/O:    Chief Complaint   Patient presents with    Follow-up     6wk f/u after clarifix       HISTORY OBTAINED FROM:  patient    HISTORY OF PRESENT ILLNESS:       Sonia Foss is a 74y.o. year old male, here today for follow up of history of bilateral vasomotor rhinitis, with a known history as well of pituitary adenoma that has been surgically excised at [facility]. Patient continues to have post ocular eye pain, consistent with chronic disease and possible changes secondary to known regrowth of the pituitary adenoma. No current complaints of new fever chills, rhinitis has improved, no history of epistaxis.       Past Medical History:   Diagnosis Date    COPD (chronic obstructive pulmonary disease) ([address]     DVT of lower extremity (deep venous thrombosis) [facility] 2007    post foot surgery      GERD (gastroesophageal reflux disease)     HIV (human immunodeficiency virus infection) ([facility]     Hyperlipidemia     Hypertension     Prostate enlargement      Past Surgical History:   Procedure Laterality Date    ABDOMEN SURGERY  1970    colon resection d/t paralytic ileus from blunt trauma to abdomen     ANKLE SURGERY Right     COLONOSCOPY      FOOT SURGERY Bilateral     hammertoe    HAMMER TOE SURGERY Left 3/22/2019    CONDYLECTOMY 4TH TOE HAMMERTOE CORRECTION  5TH TOE LEFT FOOT. V TO Y SKIN PLASTY 5TH METATARSAL PHALANGEAL JOINT LEFT FOOT performed by Ann Shane., SUZANNA at [address]      LYMPHADENECTOMY Left     axilla & Rt side neck    OTHER SURGICAL HISTORY Left 2019    hammertoe correction 4th and 5th toes left foot, skin plasty 5th MPJ left foot       Current Outpatient Medications:     apixaban (ELIQUIS) 5 MG TABS tablet, TAKE ONE TABLET BY MOUTH TWICE A DAY AS DIRECTED BY THE [facility] ([phone] [id number]), Disp: , Rfl:     montelukast (SINGULAIR) 10 MG tablet, Take 1 tablet by mouth daily, Disp: 30 tablet, Rfl: 3    Loratadine-Pseudoephedrine (CLARITIN-D 12 HOUR PO), Take by mouth, Disp: , Rfl:     Aromatic Inhalants (VICKS VAPOR IN), Inhale into the lungs In a machine to help him breath, Disp: , Rfl:     Hypertonic Nasal Wash (SINUS RINSE) PACK, 1 kit by Nasal route daily, Disp: 1 kit, Rfl: 1    azelastine (ASTELIN) 0.1 % nasal spray, 2 sprays by Nasal route 2 times daily Use in each nostril as directed, Disp: 3 Bottle, Rfl: 2    Diphenhydramine-Pseudoephed (BENADRYL ALLERGY/SINUS PO), Take 10 mg by mouth 3 times daily, Disp: , Rfl:     hydroCHLOROthiazide (HYDRODIURIL) 25 MG tablet, Take 25 mg by mouth daily, Disp: , Rfl:     lisinopril (PRINIVIL;ZESTRIL) 20 MG tablet, Take 20 mg by mouth daily, Disp: , Rfl:     sodium chloride (OCEAN, BABY AYR) 0.65 % nasal spray, 2 sprays by Nasal route as needed for Congestion (5 times daily), Disp: , Rfl:     fluticasone (FLONASE) 50 MCG/ACT nasal spray, 2 sprays by Each Nostril route daily, Disp: , Rfl:     tiotropium (SPIRIVA RESPIMAT) 1.25 MCG/ACT AERS inhaler, Inhale 2 puffs into the lungs daily, Disp: , Rfl:     omeprazole (PRILOSEC) 20 MG delayed release capsule, Take 20 mg by mouth daily, Disp: , Rfl:     Abacavir-Dolutegravir-Lamivud (TRIUMEQ) 600- MG TABS, Take 1 tablet by mouth nightly, Disp: , Rfl:     rosuvastatin (CRESTOR) 40 MG tablet, Take 40 mg by mouth every evening, Disp: , Rfl:     Sodium Chloride-Sodium Bicarb 1.57 g PACK, 1 Package by Nasal route daily, Disp: 30 each, Rfl: 2  Cat hair extract, Other, Seasonal, and Ultram [tramadol hcl]  Social History     Tobacco Use    Smoking status: Former Smoker     Years: 40.00     Types: Cigarettes     Quit date: 1/1/2019     Years since quitting: 3.1    Smokeless tobacco: Never Used   Substance Use Topics    Alcohol use: No    Drug use: No     Family History   Problem Relation Age of Onset    Breast Cancer Mother     Colon Cancer Mother     Heart Disease Father     Stroke Father     Diabetes Father     Lung Cancer Brother        Review of Systems   Constitutional: Negative for chills and fever. HENT: Positive for congestion and sinus pressure. Negative for ear discharge and hearing loss. Respiratory: Negative for cough and shortness of breath. Cardiovascular: Negative for chest pain and palpitations. Gastrointestinal: Negative for vomiting. Skin: Negative for rash. Allergic/Immunologic: Negative for environmental allergies. Neurological: Negative for dizziness and headaches. Hematological: Does not bruise/bleed easily. All other systems reviewed and are negative. /70 (Site: Right Upper Arm, Position: Sitting, Cuff Size: Large Adult)   Pulse (!) 43   Ht 6' 6\" (1.981 m)   Wt 293 lb (132.9 kg)   BMI 33.86 kg/m²   Physical Exam  Vitals and nursing note reviewed. Constitutional:       Appearance: He is well-developed. HENT:      Nose: Congestion and rhinorrhea present. Mouth/Throat:      Mouth: Mucous membranes are moist.   Eyes:      Pupils: Pupils are equal, round, and reactive to light. Neck:      Thyroid: No thyromegaly. Trachea: No tracheal deviation. Cardiovascular:      Rate and Rhythm: Normal rate. Pulmonary:      Effort: Pulmonary effort is normal. No respiratory distress. Musculoskeletal:         General: Normal range of motion. Cervical back: Normal range of motion. Lymphadenopathy:      Cervical: No cervical adenopathy. Skin:     General: Skin is warm. Findings: No erythema. Neurological:      Mental Status: He is alert. Cranial Nerves: No cranial nerve deficit.          IMPRESSION/PLAN:    Patient seen and examined, significant improvement overall vasomotor rhinitis but continues to have a persistent posterior headache with a known history of pituitary macroadenoma who was seen by neurosurgery this

## 2022-03-11 ENCOUNTER — TELEPHONE (OUTPATIENT)
Dept: ADMINISTRATIVE | Age: 67
End: 2022-03-11

## 2022-03-11 NOTE — TELEPHONE ENCOUNTER
A neurology referral was received with dx. Vasomotor rhinitis, Hypertrophy of both inferior nasal turbinates, DNS (deviated nasal septum. Please review referral and update referral with diagnoses that pt is being referred to neurology for.

## 2022-03-14 ENCOUNTER — TELEPHONE (OUTPATIENT)
Dept: ADMINISTRATIVE | Age: 67
End: 2022-03-14

## 2022-03-14 NOTE — TELEPHONE ENCOUNTER
Pt called to schedule appt with neurology. The referral that was received had a diagnosis of pituitary adenoma. Per neurology office, neurology does not schedule for this diagnosis. Pt said he was having headaches. Please update the diagnosis. Once referral is updated, pt will be called to schedule. Thank you.

## 2022-03-15 ENCOUNTER — TELEPHONE (OUTPATIENT)
Dept: ENT CLINIC | Age: 67
End: 2022-03-15

## 2022-03-15 NOTE — TELEPHONE ENCOUNTER
Pt called in concerned because he noticed his diagnoses was changed. Explained to pt the secondary diagnoses is also in his chart and Neurology wouldn't accept the first diagnoses. Pt was concerned bc he hasn't been called as of yet. Notified pt I will call neurology to see if pt can be seen with secondary diagnoses.

## 2022-03-15 NOTE — TELEPHONE ENCOUNTER
Please update referral in epic. The diagnosis has not been changed. It still says pituitary adenoma.

## 2022-03-15 NOTE — TELEPHONE ENCOUNTER
Original Dx:  Vasomotor rhinitis [J30.0]  Hypertrophy of both inferior nasal turbinates [J34.3]  DNS (deviated nasal septum) [J34.2]    They were changed to Pituitary Adenoma.

## 2022-03-17 ENCOUNTER — TELEPHONE (OUTPATIENT)
Dept: ENT CLINIC | Age: 67
End: 2022-03-17

## 2022-03-17 DIAGNOSIS — S83.206A TEAR OF MENISCUS OF RIGHT KNEE, UNSPECIFIED MENISCUS, UNSPECIFIED TEAR TYPE, UNSPECIFIED WHETHER OLD OR CURRENT TEAR: ICD-10-CM

## 2022-03-17 DIAGNOSIS — M17.10 PATELLOFEMORAL ARTHRITIS: Primary | ICD-10-CM

## 2022-03-17 PROCEDURE — 99421 OL DIG E/M SVC 5-10 MIN: CPT | Performed by: ORTHOPAEDIC SURGERY

## 2022-03-17 NOTE — PROGRESS NOTES
Patient's chart was reviewed including pertinent information including allergies, current medications, medical conditions, last exam and office notes, available musculoskeletal imaging, most recent labs and other physician and provider notes. In 6-minute chart review, the most appropriate medication/treatment was ordered. In addition, patient did not/ does not have any scheduled office visits within 7 days of this encounter. Patient was educated to monitor for adverse reactions and to notify office of any issues.         Diagnoses and all orders for this visit:    Patellofemoral arthritis  -     External Referral To Physical Therapy    Tear of meniscus of right knee, unspecified meniscus, unspecified tear type, unspecified whether old or current tear  -     External Referral To Physical Therapy

## 2022-03-17 NOTE — TELEPHONE ENCOUNTER
Left vm for [facility] regarding diagnosis being changed x3, and Neurology scheduling the pt. Left office number asking for call back.

## 2022-03-24 ASSESSMENT — ENCOUNTER SYMPTOMS
COUGH: 0
VOMITING: 0
SHORTNESS OF BREATH: 0
SINUS PRESSURE: 1

## 2022-04-20 ENCOUNTER — APPOINTMENT (OUTPATIENT)
Dept: GENERAL RADIOLOGY | Age: 67
End: 2022-04-20
Payer: COMMERCIAL

## 2022-04-20 ENCOUNTER — HOSPITAL ENCOUNTER (EMERGENCY)
Age: 67
Discharge: HOME | End: 2022-04-20
Payer: COMMERCIAL

## 2022-04-20 VITALS
HEART RATE: 96 BPM | BODY MASS INDEX: 33.32 KG/M2 | HEIGHT: 78 IN | TEMPERATURE: 98 F | SYSTOLIC BLOOD PRESSURE: 133 MMHG | RESPIRATION RATE: 24 BRPM | OXYGEN SATURATION: 93 % | WEIGHT: 288 LBS | DIASTOLIC BLOOD PRESSURE: 84 MMHG

## 2022-04-20 DIAGNOSIS — J40 BRONCHITIS: ICD-10-CM

## 2022-04-20 DIAGNOSIS — J06.9 UPPER RESPIRATORY TRACT INFECTION, UNSPECIFIED TYPE: Primary | ICD-10-CM

## 2022-04-20 LAB
INFLUENZA A BY PCR: NOT DETECTED
INFLUENZA B BY PCR: NOT DETECTED
SARS-COV-2, NAAT: NOT DETECTED

## 2022-04-20 PROCEDURE — 87635 SARS-COV-2 COVID-19 AMP PRB: CPT

## 2022-04-20 PROCEDURE — 99212 OFFICE O/P EST SF 10 MIN: CPT

## 2022-04-20 PROCEDURE — 87502 INFLUENZA DNA AMP PROBE: CPT

## 2022-04-20 PROCEDURE — 71046 X-RAY EXAM CHEST 2 VIEWS: CPT

## 2022-04-20 RX ORDER — AZITHROMYCIN 250 MG/1
TABLET, FILM COATED ORAL
Qty: 6 TABLET | Refills: 0 | Status: SHIPPED | OUTPATIENT
Start: 2022-04-20 | End: 2022-04-30

## 2022-04-20 RX ORDER — PREDNISONE 20 MG/1
40 TABLET ORAL DAILY
Qty: 10 TABLET | Refills: 0 | Status: SHIPPED | OUTPATIENT
Start: 2022-04-20 | End: 2022-04-25

## 2022-04-20 ASSESSMENT — PAIN DESCRIPTION - DESCRIPTORS: DESCRIPTORS: ACHING;PRESSURE

## 2022-04-20 ASSESSMENT — PAIN SCALES - GENERAL: PAINLEVEL_OUTOF10: 5

## 2022-04-20 ASSESSMENT — PAIN DESCRIPTION - LOCATION: LOCATION: HEAD

## 2022-04-20 ASSESSMENT — PAIN - FUNCTIONAL ASSESSMENT: PAIN_FUNCTIONAL_ASSESSMENT: 0-10

## 2022-04-20 NOTE — ED PROVIDER NOTES
Department of Emergency Medicine   [address] [place] [address]  Provider Note  Admit Date/RoomTime: 2022  6:11 PM  Room:   NAME: Monica Ribeiro  : 1955  MRN: [id number]     Chief Complaint:  Cough (productive yesterday with white phlegm, sob), Pharyngitis, and Nasal Congestion (stuffy with PND)    History of Present Illness       Monica Ribeiro is a 74 y.o. old male who presents to the urgent care center by private vehicle for relation he started getting sick yesterday with productive cough with white phlegm he feels short of breath he has a sore throat he has nasal congestion and sinus congestion. He has a harsh cough. He said he feels achy all over and fatigued. Court Dials is not running a fever he said he has been vaccinated for COVID. ROS    Pertinent positives and negatives are stated within HPI, all other systems reviewed and are negative. Past Surgical History:   Procedure Laterality Date    ABDOMEN SURGERY      colon resection d/t paralytic ileus from blunt trauma to abdomen     ANKLE SURGERY Right     COLONOSCOPY      FOOT SURGERY Bilateral     hammertoe    HAMMER TOE SURGERY Left 3/22/2019    CONDYLECTOMY 4TH TOE HAMMERTOE CORRECTION  5TH TOE LEFT FOOT. V TO Y SKIN PLASTY 5TH METATARSAL PHALANGEAL JOINT LEFT FOOT performed by Reid Winn DPM at [address]      LYMPHADENECTOMY Left     axilla & Rt side neck    OTHER SURGICAL HISTORY Left 2019    hammertoe correction 4th and 5th toes left foot, skin plasty 5th MPJ left foot   Social History:  reports that he quit smoking about 3 years ago. His smoking use included cigarettes. He quit after 40.00 years of use. He has never used smokeless tobacco. He reports that he does not drink alcohol and does not use drugs.   Family History: family history includes Breast Cancer in his mother; Colon Cancer in his mother; Diabetes in his father; Heart Disease in his father; Steven Franklin in his brother; Stroke in his father. Allergies: Cat hair extract, Other, Seasonal, and Ultram [tramadol hcl]    Physical Exam            ED Triage Vitals [04/20/22 1814]   BP Temp Temp src Pulse Resp SpO2 Height Weight   133/84 98 °F (36.7 °C) -- 96 24 93 % 6' 6\" (1.981 m) 288 lb (130.6 kg)      Oxygen Saturation Interpretation: Normal.    Constitutional:  Alert, development consistent with age. Ears:  External Ears: Bilateral normal.               TM's & External Canals: normal appearance, normal TMs bilaterally. Nose:   There is no discharge, swelling or lesions noted. Sinuses: no Bilateral maxillary sinus tenderness. no Bilateral frontal sinus tenderness. Mouth:  normal tongue and buccal mucosa. Throat: no erythema or exudates noted. Teeth and gums normal..  Airway Patent. Neck/Lymphatics:  Neck Supple. Respiratory:   .  Lung sounds: diminished breath sounds- throughout. CV:  Regular rate and rhythm, normal heart sounds, without pathological murmurs, ectopy, gallops, or rubs. GI:  Abdomen Soft, nontender, good bowel sounds. No firm or pulsatile mass. Integument:  Normal turgor. Warm, dry, without visible rash. Neurological:  Oriented. Motor functions intact. Lab / Imaging Results   (All laboratory and radiology results have been personally reviewed by myself)  Labs:  Results for orders placed or performed during the hospital encounter of 04/20/22   COVID-19, Rapid    Specimen: Nasopharyngeal Swab   Result Value Ref Range    SARS-CoV-2, NAAT Not Detected Not Detected   Rapid influenza A/B antigens    Specimen: Nares   Result Value Ref Range    Influenza A by PCR Not Detected Not Detected    Influenza B by PCR Not Detected Not Detected     Imaging: All Radiology results interpreted by Radiologist unless otherwise noted. XR CHEST (2 VW)   Final Result   No acute process.            ED Course / Medical Decision Making   Medications - No data to display       MDM:   He has a harsh cough breath sounds are diminished I did a chest x-ray and it was negative. I also checked for COVID and influenza since he is achy all over and fatigued and congested and it was negative. I did put him on a Z-Allan he has been taking over-the-counter cold medicine he can continue that , I didput him on some prednisone and he should follow-up with his PCP if anything worsens he should get reevaluated    1. Upper respiratory tract infection, unspecified type    2. Bronchitis      Plan   Discharge to home and advised to contact Patricia Gonzalez MD  Sandeep Baxter   [phone]    Schedule an appointment as soon as possible for a visit      Patient condition is good    New Medications     New Prescriptions    AZITHROMYCIN (ZITHROMAX Z-ALLAN) 250 MG TABLET    Take 2 tabs on day one, followed by 1 tablet daily for 4 days. PREDNISONE (DELTASONE) 20 MG TABLET    Take 2 tablets by mouth daily for 5 days     Electronically signed by ANDRZEJ Herring CNP   DD: 4/20/22  **This report was transcribed using voice recognition software. Every effort was made to ensure accuracy; however, inadvertent computerized transcription errors may be present.   END OF ED PROVIDER NOTE     ANDRZEJ Herring CNP  04/20/22 3834

## 2022-05-09 ENCOUNTER — OFFICE VISIT (OUTPATIENT)
Dept: NEUROLOGY | Age: 67
End: 2022-05-09
Payer: COMMERCIAL

## 2022-05-09 VITALS
HEIGHT: 78 IN | WEIGHT: 288 LBS | DIASTOLIC BLOOD PRESSURE: 62 MMHG | SYSTOLIC BLOOD PRESSURE: 140 MMHG | BODY MASS INDEX: 33.32 KG/M2

## 2022-05-09 DIAGNOSIS — G44.86 CERVICOGENIC HEADACHE: ICD-10-CM

## 2022-05-09 DIAGNOSIS — G44.209 MUSCLE CONTRACTION HEADACHE: ICD-10-CM

## 2022-05-09 DIAGNOSIS — D35.2 PITUITARY ADENOMA (HCC): ICD-10-CM

## 2022-05-09 DIAGNOSIS — M99.01 CERVICAL (NECK) REGION SOMATIC DYSFUNCTION: Chronic | ICD-10-CM

## 2022-05-09 DIAGNOSIS — E22.0 ACROMEGALY (HCC): ICD-10-CM

## 2022-05-09 DIAGNOSIS — M54.2 CERVICALGIA: Primary | ICD-10-CM

## 2022-05-09 PROCEDURE — 4040F PNEUMOC VAC/ADMIN/RCVD: CPT | Performed by: PSYCHIATRY & NEUROLOGY

## 2022-05-09 PROCEDURE — 1036F TOBACCO NON-USER: CPT | Performed by: PSYCHIATRY & NEUROLOGY

## 2022-05-09 PROCEDURE — G8417 CALC BMI ABV UP PARAM F/U: HCPCS | Performed by: PSYCHIATRY & NEUROLOGY

## 2022-05-09 PROCEDURE — 3017F COLORECTAL CA SCREEN DOC REV: CPT | Performed by: PSYCHIATRY & NEUROLOGY

## 2022-05-09 PROCEDURE — G8427 DOCREV CUR MEDS BY ELIG CLIN: HCPCS | Performed by: PSYCHIATRY & NEUROLOGY

## 2022-05-09 PROCEDURE — 99204 OFFICE O/P NEW MOD 45 MIN: CPT | Performed by: PSYCHIATRY & NEUROLOGY

## 2022-05-09 PROCEDURE — 1123F ACP DISCUSS/DSCN MKR DOCD: CPT | Performed by: PSYCHIATRY & NEUROLOGY

## 2022-05-09 RX ORDER — TOPIRAMATE 25 MG/1
TABLET ORAL
Qty: 124 TABLET | Refills: 3 | Status: SHIPPED
Start: 2022-05-09 | End: 2022-05-09

## 2022-05-09 RX ORDER — TOPIRAMATE 25 MG/1
TABLET ORAL
Qty: 372 TABLET | Refills: 1 | Status: ON HOLD
Start: 2022-05-09 | End: 2022-06-28

## 2022-05-09 ASSESSMENT — ENCOUNTER SYMPTOMS
RESPIRATORY NEGATIVE: 1
ALLERGIC/IMMUNOLOGIC NEGATIVE: 1
EYES NEGATIVE: 1

## 2022-05-09 NOTE — PROGRESS NOTES
Neurology Consult Note:    Patient: Socorro Roberts  : 1955  Date: 22  Referring provider: Van Cifuentes DO    Referral to Neurology:hx post-traumatic headache reported, s/p pituitary adenoma resection. History of vasomotor rhinitis. Abnormal MRI brain scan 2017, possible residual pituitary adenoma. Dear Van Cifuentes DO:    Thank you for your referral of Socorro Roberts to the neurology clinic, a 67-year-old man with history of posttraumatic headaches reported status post pituitary adenoma resection. In 2017, he had an abnormal MR brain scan which showed possible residual pituitary adenoma, left sella turcica. Headache history: Onset: started about 3 yrs. ago s/p pituitary adenoma resection, Dr. Lorna Martinez  Location: across forehead  Pain type: sharp intemittent stabbing pains  Frequency: up to 7 times daily  Duration:2-3 mins. At a time   Positive symptoms: no N/V, photophobia/sonophobia  Triggers:occurs when reading, working puzzles, bending head/neck down in flexed position, watching TV, no ear pain. Caffeine use: none  Family hx: no headaches  Medications used/tried: no prescription meds. ; prn Tylenol. Medical co-morbidities: DVT rt. leg, Eliquis; HTN; acromegaly, pituitary aednoma; chronic posterior neck and low back pain; hyperlipidemia; insomnia, melatonin intolerance (dry mouth). Lab Data: Reviewed from 2022, creatinine 1.5, glucose 106    Imaging Data: 2021, MR brain scan with and without contrast:  1. Compared to 2019, there is interval transsphenoidal resection of the   pituitary macroadenoma.  On the current study, a 15 x 9 x 10 mm mass is seen   in the left sella turcica which may represent recurrent or residual mass.  No   suprasellar component is currently present. 2. Enhancing soft tissue in the right sella turcica likely represents   residual pituitary gland. 3. The MRI of the brain is unremarkable for age.    4. Prominent mucosal thickening in the left sphenoid sinus. Current Outpatient Medications   Medication Sig Dispense Refill    apixaban (ELIQUIS) 5 MG TABS tablet TAKE ONE TABLET BY MOUTH TWICE A DAY AS DIRECTED BY THE [facility] ([phone] [id number])      montelukast (SINGULAIR) 10 MG tablet Take 1 tablet by mouth daily 30 tablet 3    Loratadine-Pseudoephedrine (CLARITIN-D 12 HOUR PO) Take by mouth      Aromatic Inhalants (VICKS VAPOR IN) Inhale into the lungs In a machine to help him breath      Hypertonic Nasal Wash (SINUS RINSE) PACK 1 kit by Nasal route daily 1 kit 1    azelastine (ASTELIN) 0.1 % nasal spray 2 sprays by Nasal route 2 times daily Use in each nostril as directed 3 Bottle 2    Diphenhydramine-Pseudoephed (BENADRYL ALLERGY/SINUS PO) Take 10 mg by mouth 3 times daily      hydroCHLOROthiazide (HYDRODIURIL) 25 MG tablet Take 25 mg by mouth daily      lisinopril (PRINIVIL;ZESTRIL) 20 MG tablet Take 20 mg by mouth daily      sodium chloride (OCEAN, BABY AYR) 0.65 % nasal spray 2 sprays by Nasal route as needed for Congestion (5 times daily)      fluticasone (FLONASE) 50 MCG/ACT nasal spray 2 sprays by Each Nostril route daily      tiotropium (SPIRIVA RESPIMAT) 1.25 MCG/ACT AERS inhaler Inhale 2 puffs into the lungs daily      omeprazole (PRILOSEC) 20 MG delayed release capsule Take 20 mg by mouth daily      Abacavir-Dolutegravir-Lamivud (TRIUMEQ) 600- MG TABS Take 1 tablet by mouth nightly      rosuvastatin (CRESTOR) 40 MG tablet Take 40 mg by mouth every evening      Sodium Chloride-Sodium Bicarb 1.57 g PACK 1 Package by Nasal route daily 30 each 2     No current facility-administered medications for this visit.        Allergies   Allergen Reactions    Cat Hair Extract     Other      Inside birds    Seasonal     Ultram [Tramadol Hcl] Itching       Patient Active Problem List   Diagnosis    Sepsis ([facility]    Prostate enlargement    HIV [facility] immunodeficiency virus infection) [facility]    DVT of lower extremity (deep venous thrombosis) ([facility]    Hyperlipidemia    HIV (human immunodeficiency virus infection) ([facility] [facility]    DVT of lower extremity (deep venous thrombosis) (HCC)    Prostate enlargement    TEAGAN (acute kidney injury) ([facility] [address] [facility]    Hammertoe of left foot    Urinary retention    Bleeding    Pituitary mass (HCC)    Syncope and collapse    CKD (chronic kidney disease) stage 2, GFR 60-89 ml/min    COPD without exacerbation (HCC)       Past Medical History:   Diagnosis Date    COPD (chronic obstructive pulmonary disease) ([facility]     DVT of lower extremity (deep venous thrombosis) ([facility] [address] [facility] 2007    post foot surgery      GERD (gastroesophageal reflux disease)     HIV (human immunodeficiency virus infection) ([facility] [facility]     Hyperlipidemia     Hypertension     Prostate enlargement        Past Surgical History:   Procedure Laterality Date    ABDOMEN SURGERY  1970    colon resection d/t paralytic ileus from blunt trauma to abdomen     ANKLE SURGERY Right     COLONOSCOPY      FOOT SURGERY Bilateral     hammertoe    HAMMER TOE SURGERY Left 3/22/2019    CONDYLECTOMY 4TH TOE HAMMERTOE CORRECTION  5TH TOE LEFT FOOT. V TO Y SKIN PLASTY 5TH METATARSAL PHALANGEAL JOINT LEFT FOOT performed by Lovely Montez., DPM at [address]      LYMPHADENECTOMY Left     axilla & Rt side neck    OTHER SURGICAL HISTORY Left 03/22/2019    hammertoe correction 4th and 5th toes left foot, skin plasty 5th MPJ left foot       Family History   Problem Relation Age of Onset    Breast Cancer Mother     Colon Cancer Mother     Heart Disease Father     Stroke Father     Diabetes Father     Lung Cancer Brother        Social History     Socioeconomic History    Marital status: Single     Spouse name: Not on file    Number of children: Not on file    Years of education: Not on file    Highest education level: Not on file   Occupational History    Not on file   Tobacco Use    Smoking status: Former Smoker     Years: 40.00     Types: Cigarettes     Quit date: 1/1/2019     Years since quitting: 3.3    Smokeless tobacco: Never Used   Substance and Sexual Activity    Alcohol use: No    Drug use: No    Sexual activity: Never   Other Topics Concern    Not on file   Social History Narrative    Not on file     Social Determinants of Health     Financial Resource Strain:     Difficulty of Paying Living Expenses: Not on file   Food Insecurity:     Worried About Running Out of Food in the Last Year: Not on file    Rebekah of Food in the Last Year: Not on file   Transportation Needs:     Lack of Transportation (Medical): Not on file    Lack of Transportation (Non-Medical): Not on file   Physical Activity:     Days of Exercise per Week: Not on file    Minutes of Exercise per Session: Not on file   Stress:     Feeling of Stress : Not on file   Social Connections:     Frequency of Communication with Friends and Family: Not on file    Frequency of Social Gatherings with Friends and Family: Not on file    Attends [employer] Services: Not on file    Active Member of [address] [place] [address] or Organizations: Not on file    Attends Club or Organization Meetings: Not on file    Marital Status: Not on file   Intimate Partner Violence:     Fear of Current or Ex-Partner: Not on file    Emotionally Abused: Not on file    Physically Abused: Not on file    Sexually Abused: Not on file   Housing Stability:     Unable to Pay for Housing in the Last Year: Not on file    Number of [place] in the Last Year: Not on file    Unstable Housing in the Last Year: Not on file     Review of Systems   Constitutional: Negative. HENT: Negative. Eyes: Negative. Respiratory: Negative. Endocrine:        Acromegaly   Genitourinary:        CKD II   Musculoskeletal: Positive for arthralgias and neck pain. Skin: Negative. Allergic/Immunologic: Negative.     Neurological: Positive for headaches. Hematological: Negative. Psychiatric/Behavioral: Positive for sleep disturbance. The patient is nervous/anxious. Neurologic Exam:  BP (!) 140/62 (Site: Right Upper Arm, Position: Sitting, Cuff Size: Medium Adult)   Ht 6' 6\" (1.981 m)   Wt 288 lb (130.6 kg)   BMI 33.28 kg/m²   General appearance: Alert, cooperative, anxious, well-nourished, groomed, seated in the exam room, no acute distress, w/phenotypic features of acromegaly. HEENT: Normocephalic/atraumatic. Neck: Supple  Cardiac: RRR  Respiratory: grossly clear  Extremities: No edema, erythema or cyanosis  Skin: No apparent lesions or rashes  Musculoskeletal: No fasciculations or tremors, no foot drop  Mental Status: Alert, oriented x3  Speech/Language: Clear, fluent  Attention span/Concentration: Grossly intact  Affect/Mood: Mildly anxious  Insight/Judgement: Fairly good     Fund of Knowledge/Current events: Grossly intact  CN II-XII:     Pupils: Equal, reactive to light, 2.0 mm.   EOM's: Full without nystagmus  Visual Fields: Full to confrontation  Fundi: Miosis to light  CN V: normal V1-V3  CN VII: No facial droop  CN VIII: Hearing grossly intact  CN IX-XII: Tongue midline  SCM/Trapezii: 5/5 power  Motor: 5/5 power in the upper and lower extremities without tremor or drift and normal motor tone. Intact fine motor function of both hands, symmetric. DTR's: 1-2+ and symmetric in the upper extremities and 1+ in the lower extremities, no ankle clonus, plantar responses are flexor. Sensory: Grossly intact subjectively to light touch and sharp stick testing throughout. Coordination/Gait: No gross limb dysmetria on finger-to-nose testing, no truncal or cerebellar ataxia. Ambulates with a rolling walker. Assessment/Plan:  1. Probable cervicogenic-related or muscle contraction headache pain types contributing to frontal headache pattern reported since pituitary adenoma resection. He does have chronic posterior cervicalgia.   This may be a type of referred headache pain syndrome. His headache history taken today reveals no migraine component and there is no prior history of migraines. 2.  Acromegaly, residual pituitary adenoma, status post pituitary resection surgery at [facility], 3 years ago per patient, Dr. Ca Le, Neurosurgery. 3.  A plain x-ray of the cervical spine is ordered to evaluate for DJD, spondylosis and once resulted will receive test results by phone. 4.  A prophylactic headache medication trial was discussed with topiramate starting 25 mg at bedtime for 1 week, then 25 mg twice daily for 1 week, then 25/50 mg twice daily for 1 week, then 50 mg twice daily if clinically indicated. A trial of an antidepressant headache pain medication such as amitriptyline, nortriptyline or doxepin is contraindicated due to his history of intolerable dry mouth which he experienced with melatonin. 5.  He is also advised to stop taking Benadryl which he is taking twice daily (Benadryl allergy/sinus). 6.  Maintain a headache calendar and follow-up in the Neurology clinic in 8 weeks otherwise. Sincerely,      Arlyn Torrez MD  Neurology & Clinical Neurophysiology    This note was created using speech recognition transcription software. Despite proofreading, there may be several typographical errors present that may affect the meaning of the content. Please call with any questions. Note: A total time of 40 mins. was spent on the date of service in preparation for this visit, which included face-to-face patient care, completing clinical documentation, counseling and coordination of care based on clinical impression, neurologic diagnosis, review of pertinent imaging studies, test results, implementation and discussion of treatment plan, risk factor reduction and patient and/or family education.     Orders Placed This Encounter   Procedures    XR CERVICAL SPINE (4-5 VIEWS)     Standing Status:   Future     Standing Expiration Date: 7/9/2022     Order Specific Question:   Reason for exam:     Answer:   evaluate for DJD, cervicogenic headaches

## 2022-05-16 ENCOUNTER — HOSPITAL ENCOUNTER (OUTPATIENT)
Dept: MRI IMAGING | Age: 67
Discharge: HOME | End: 2022-05-18
Payer: OTHER GOVERNMENT

## 2022-05-16 DIAGNOSIS — M54.16 LUMBAR RADICULOPATHY: ICD-10-CM

## 2022-05-16 PROCEDURE — 72148 MRI LUMBAR SPINE W/O DYE: CPT

## 2022-05-22 LAB — FIT, EXTERNAL: NEGATIVE

## 2022-05-25 ENCOUNTER — TELEPHONE (OUTPATIENT)
Dept: ENT CLINIC | Age: 67
End: 2022-05-25

## 2022-05-25 NOTE — TELEPHONE ENCOUNTER
Pt lm at office requesting a call back, dr Louis Sahni referred pt to a neurologist and needs the phone number for her office. Can be called back at [phone]. Returned call to pt he stated he was outside and didn't have a way to take down the phone number-- he will call the office back. Patient needs number for Dr Esteban Marrufo which is [phone].

## 2022-06-25 NOTE — ED NOTES
Pt changed into gown. Tele monitor and pulse ox applied. ekg performed and given to Dr Terrence Schwarz.      Maxi Tucker  06/25/18 [id number] negative...

## 2022-06-27 ENCOUNTER — HOSPITAL ENCOUNTER (INPATIENT)
Age: 67
LOS: 1 days | Discharge: HOME HEALTH | DRG: 312 | End: 2022-06-29
Attending: EMERGENCY MEDICINE | Admitting: INTERNAL MEDICINE
Payer: OTHER GOVERNMENT

## 2022-06-27 ENCOUNTER — APPOINTMENT (OUTPATIENT)
Dept: GENERAL RADIOLOGY | Age: 67
DRG: 312 | End: 2022-06-27
Payer: OTHER GOVERNMENT

## 2022-06-27 ENCOUNTER — APPOINTMENT (OUTPATIENT)
Dept: CT IMAGING | Age: 67
DRG: 312 | End: 2022-06-27
Payer: OTHER GOVERNMENT

## 2022-06-27 DIAGNOSIS — R55 SYNCOPE AND COLLAPSE: Primary | ICD-10-CM

## 2022-06-27 LAB
ALBUMIN SERPL-MCNC: 4 G/DL (ref 3.5–5.2)
ALP BLD-CCNC: 67 U/L (ref 40–129)
ALT SERPL-CCNC: 14 U/L (ref 0–40)
ANION GAP SERPL CALCULATED.3IONS-SCNC: 9 MMOL/L (ref 7–16)
AST SERPL-CCNC: 23 U/L (ref 0–39)
BASOPHILS ABSOLUTE: 0.07 E9/L (ref 0–0.2)
BASOPHILS RELATIVE PERCENT: 0.9 % (ref 0–2)
BILIRUB SERPL-MCNC: 0.6 MG/DL (ref 0–1.2)
BUN BLDV-MCNC: 16 MG/DL (ref 6–23)
CALCIUM SERPL-MCNC: 8.9 MG/DL (ref 8.6–10.2)
CHLORIDE BLD-SCNC: 103 MMOL/L (ref 98–107)
CO2: 24 MMOL/L (ref 22–29)
CREAT SERPL-MCNC: 1.3 MG/DL (ref 0.7–1.2)
EOSINOPHILS ABSOLUTE: 0.52 E9/L (ref 0.05–0.5)
EOSINOPHILS RELATIVE PERCENT: 6.6 % (ref 0–6)
GFR AFRICAN AMERICAN: >60
GFR NON-AFRICAN AMERICAN: 55 ML/MIN/1.73
GLUCOSE BLD-MCNC: 101 MG/DL (ref 74–99)
HCT VFR BLD CALC: 40.4 % (ref 37–54)
HEMOGLOBIN: 13.8 G/DL (ref 12.5–16.5)
IMMATURE GRANULOCYTES #: 0.02 E9/L
IMMATURE GRANULOCYTES %: 0.3 % (ref 0–5)
LYMPHOCYTES ABSOLUTE: 2.04 E9/L (ref 1.5–4)
LYMPHOCYTES RELATIVE PERCENT: 25.9 % (ref 20–42)
MCH RBC QN AUTO: 32.3 PG (ref 26–35)
MCHC RBC AUTO-ENTMCNC: 34.2 % (ref 32–34.5)
MCV RBC AUTO: 94.6 FL (ref 80–99.9)
MONOCYTES ABSOLUTE: 0.58 E9/L (ref 0.1–0.95)
MONOCYTES RELATIVE PERCENT: 7.4 % (ref 2–12)
NEUTROPHILS ABSOLUTE: 4.64 E9/L (ref 1.8–7.3)
NEUTROPHILS RELATIVE PERCENT: 58.9 % (ref 43–80)
PDW BLD-RTO: 14.2 FL (ref 11.5–15)
PLATELET # BLD: 236 E9/L (ref 130–450)
PMV BLD AUTO: 9.3 FL (ref 7–12)
POTASSIUM REFLEX MAGNESIUM: 3.9 MMOL/L (ref 3.5–5)
PRO-BNP: 61 PG/ML (ref 0–125)
RBC # BLD: 4.27 E12/L (ref 3.8–5.8)
SODIUM BLD-SCNC: 136 MMOL/L (ref 132–146)
TOTAL PROTEIN: 7.2 G/DL (ref 6.4–8.3)
TROPONIN, HIGH SENSITIVITY: 17 NG/L (ref 0–11)
WBC # BLD: 7.9 E9/L (ref 4.5–11.5)

## 2022-06-27 PROCEDURE — 36415 COLL VENOUS BLD VENIPUNCTURE: CPT

## 2022-06-27 PROCEDURE — 84484 ASSAY OF TROPONIN QUANT: CPT

## 2022-06-27 PROCEDURE — 70450 CT HEAD/BRAIN W/O DYE: CPT

## 2022-06-27 PROCEDURE — 85025 COMPLETE CBC W/AUTO DIFF WBC: CPT

## 2022-06-27 PROCEDURE — 80053 COMPREHEN METABOLIC PANEL: CPT

## 2022-06-27 PROCEDURE — 83880 ASSAY OF NATRIURETIC PEPTIDE: CPT

## 2022-06-27 PROCEDURE — 93005 ELECTROCARDIOGRAM TRACING: CPT | Performed by: STUDENT IN AN ORGANIZED HEALTH CARE EDUCATION/TRAINING PROGRAM

## 2022-06-27 PROCEDURE — 99285 EMERGENCY DEPT VISIT HI MDM: CPT

## 2022-06-27 PROCEDURE — 71045 X-RAY EXAM CHEST 1 VIEW: CPT

## 2022-06-27 ASSESSMENT — ENCOUNTER SYMPTOMS
COUGH: 0
ABDOMINAL PAIN: 0
VOICE CHANGE: 0
SHORTNESS OF BREATH: 0
TROUBLE SWALLOWING: 0
NAUSEA: 0
RHINORRHEA: 0
DIARRHEA: 0
VOMITING: 0
PHOTOPHOBIA: 0

## 2022-06-27 ASSESSMENT — PAIN - FUNCTIONAL ASSESSMENT
PAIN_FUNCTIONAL_ASSESSMENT: NONE - DENIES PAIN
PAIN_FUNCTIONAL_ASSESSMENT: NONE - DENIES PAIN

## 2022-06-28 ENCOUNTER — APPOINTMENT (OUTPATIENT)
Dept: MRI IMAGING | Age: 67
DRG: 312 | End: 2022-06-28
Payer: OTHER GOVERNMENT

## 2022-06-28 ENCOUNTER — APPOINTMENT (OUTPATIENT)
Dept: ULTRASOUND IMAGING | Age: 67
DRG: 312 | End: 2022-06-28
Payer: OTHER GOVERNMENT

## 2022-06-28 LAB
EKG ATRIAL RATE: 69 BPM
EKG P AXIS: 22 DEGREES
EKG P-R INTERVAL: 196 MS
EKG Q-T INTERVAL: 394 MS
EKG QRS DURATION: 92 MS
EKG QTC CALCULATION (BAZETT): 422 MS
EKG R AXIS: 13 DEGREES
EKG T AXIS: 30 DEGREES
EKG VENTRICULAR RATE: 69 BPM
LV EF: 56 %
LVEF MODALITY: NORMAL
TROPONIN, HIGH SENSITIVITY: 13 NG/L (ref 0–11)
TROPONIN, HIGH SENSITIVITY: 14 NG/L (ref 0–11)
TROPONIN, HIGH SENSITIVITY: 14 NG/L (ref 0–11)

## 2022-06-28 PROCEDURE — 6360000002 HC RX W HCPCS: Performed by: INTERNAL MEDICINE

## 2022-06-28 PROCEDURE — 36415 COLL VENOUS BLD VENIPUNCTURE: CPT

## 2022-06-28 PROCEDURE — 1200000000 HC SEMI PRIVATE

## 2022-06-28 PROCEDURE — 97161 PT EVAL LOW COMPLEX 20 MIN: CPT | Performed by: PHYSICAL THERAPIST

## 2022-06-28 PROCEDURE — 84484 ASSAY OF TROPONIN QUANT: CPT

## 2022-06-28 PROCEDURE — 93306 TTE W/DOPPLER COMPLETE: CPT

## 2022-06-28 PROCEDURE — 94640 AIRWAY INHALATION TREATMENT: CPT

## 2022-06-28 PROCEDURE — 94664 DEMO&/EVAL PT USE INHALER: CPT

## 2022-06-28 PROCEDURE — 6360000004 HC RX CONTRAST MEDICATION: Performed by: RADIOLOGY

## 2022-06-28 PROCEDURE — 6370000000 HC RX 637 (ALT 250 FOR IP): Performed by: INTERNAL MEDICINE

## 2022-06-28 PROCEDURE — 70553 MRI BRAIN STEM W/O & W/DYE: CPT

## 2022-06-28 PROCEDURE — A9577 INJ MULTIHANCE: HCPCS | Performed by: RADIOLOGY

## 2022-06-28 PROCEDURE — 93880 EXTRACRANIAL BILAT STUDY: CPT

## 2022-06-28 RX ORDER — ROSUVASTATIN CALCIUM 10 MG/1
40 TABLET, COATED ORAL EVERY EVENING
Status: DISCONTINUED | OUTPATIENT
Start: 2022-06-28 | End: 2022-06-29 | Stop reason: HOSPADM

## 2022-06-28 RX ORDER — LISINOPRIL 20 MG/1
20 TABLET ORAL DAILY
Status: DISCONTINUED | OUTPATIENT
Start: 2022-06-28 | End: 2022-06-29 | Stop reason: HOSPADM

## 2022-06-28 RX ORDER — ONDANSETRON 2 MG/ML
4 INJECTION INTRAMUSCULAR; INTRAVENOUS EVERY 6 HOURS PRN
Status: DISCONTINUED | OUTPATIENT
Start: 2022-06-28 | End: 2022-06-29 | Stop reason: HOSPADM

## 2022-06-28 RX ORDER — MONTELUKAST SODIUM 10 MG/1
10 TABLET ORAL DAILY
Status: DISCONTINUED | OUTPATIENT
Start: 2022-06-28 | End: 2022-06-29 | Stop reason: HOSPADM

## 2022-06-28 RX ORDER — SODIUM CHLORIDE AND POTASSIUM CHLORIDE .9; .15 G/100ML; G/100ML
SOLUTION INTRAVENOUS CONTINUOUS
Status: DISCONTINUED | OUTPATIENT
Start: 2022-06-28 | End: 2022-06-29 | Stop reason: HOSPADM

## 2022-06-28 RX ORDER — TOPIRAMATE 25 MG/1
25 TABLET ORAL NIGHTLY
Status: DISCONTINUED | OUTPATIENT
Start: 2022-06-28 | End: 2022-06-29 | Stop reason: HOSPADM

## 2022-06-28 RX ORDER — LAMIVUDINE 150 MG/1
300 TABLET, FILM COATED ORAL NIGHTLY
Status: DISCONTINUED | OUTPATIENT
Start: 2022-06-28 | End: 2022-06-29 | Stop reason: HOSPADM

## 2022-06-28 RX ORDER — PANTOPRAZOLE SODIUM 40 MG/1
40 TABLET, DELAYED RELEASE ORAL
Status: DISCONTINUED | OUTPATIENT
Start: 2022-06-28 | End: 2022-06-29 | Stop reason: HOSPADM

## 2022-06-28 RX ORDER — ABACAVIR 300 MG/1
600 TABLET ORAL NIGHTLY
Status: DISCONTINUED | OUTPATIENT
Start: 2022-06-28 | End: 2022-06-29 | Stop reason: HOSPADM

## 2022-06-28 RX ADMIN — MONTELUKAST 10 MG: 10 TABLET, FILM COATED ORAL at 11:11

## 2022-06-28 RX ADMIN — GADOBENATE DIMEGLUMINE 20 ML: 529 INJECTION, SOLUTION INTRAVENOUS at 12:52

## 2022-06-28 RX ADMIN — APIXABAN 5 MG: 5 TABLET, FILM COATED ORAL at 21:27

## 2022-06-28 RX ADMIN — IPRATROPIUM BROMIDE 0.5 MG: 0.5 SOLUTION RESPIRATORY (INHALATION) at 10:21

## 2022-06-28 RX ADMIN — ROSUVASTATIN 40 MG: 10 TABLET, FILM COATED ORAL at 21:27

## 2022-06-28 RX ADMIN — IPRATROPIUM BROMIDE 0.5 MG: 0.5 SOLUTION RESPIRATORY (INHALATION) at 18:19

## 2022-06-28 RX ADMIN — ABACAVIR 600 MG: 300 TABLET, FILM COATED ORAL at 21:28

## 2022-06-28 RX ADMIN — PANTOPRAZOLE SODIUM 40 MG: 40 TABLET, DELAYED RELEASE ORAL at 08:45

## 2022-06-28 RX ADMIN — LISINOPRIL 20 MG: 20 TABLET ORAL at 11:12

## 2022-06-28 RX ADMIN — LAMIVUDINE 300 MG: 150 TABLET, FILM COATED ORAL at 21:28

## 2022-06-28 RX ADMIN — DOLUTEGRAVIR SODIUM 50 MG: 50 TABLET, FILM COATED ORAL at 21:29

## 2022-06-28 RX ADMIN — APIXABAN 5 MG: 5 TABLET, FILM COATED ORAL at 08:45

## 2022-06-28 RX ADMIN — POTASSIUM CHLORIDE AND SODIUM CHLORIDE: 900; 150 INJECTION, SOLUTION INTRAVENOUS at 08:49

## 2022-06-28 ASSESSMENT — LIFESTYLE VARIABLES
HOW OFTEN DO YOU HAVE A DRINK CONTAINING ALCOHOL: NEVER
HOW MANY STANDARD DRINKS CONTAINING ALCOHOL DO YOU HAVE ON A TYPICAL DAY: 1 OR 2

## 2022-06-28 ASSESSMENT — PAIN SCALES - GENERAL: PAINLEVEL_OUTOF10: 0

## 2022-06-28 NOTE — ACP (ADVANCE CARE PLANNING)
Advance Care Planning   Healthcare Decision Maker:    Primary Decision Maker: Mirian Bashir - Brother/Sister - [phone]    Click here to complete Healthcare Decision Makers including selection of the Healthcare Decision Maker Relationship (ie \"Primary\").

## 2022-06-28 NOTE — ED PROVIDER NOTES
HPI   Patient is a 33-year-old male with past medical history of HIV, hyperlipidemia, hypertension, DVT and pituitary adenoma status postresection presenting to the emergency department due to syncope. Patient states that tonight he was calling [employer] and apparently passed out 3 times within a span of 45 minutes. Patient states that he cannot remember much of what happened. He remembers hearing several people telling him to wake up but not much after that. Patient does admit to syncope in the past.  He states that the same thing happened 3 years ago and he was essentially found to have a brain tumor after MRI scan. Patient states that he had the tumor removed by [facility] and has been doing well since then. He apparently gets yearly checkups for this and reports that his tumor has returned but is apparently stable. Tonight, patient denies any inciting factors for his symptoms. He denies any recent illnesses. Patient endorses chills but denies any other symptoms including nausea, vomiting, diaphoresis, chest pain, shortness of breath, fever, lightheadedness, dizziness, cough, congestion, urinary symptoms, abdominal pain, constipation or diarrhea. His symptoms are moderate with no remitting or exacerbating factors. Patient has had no recent sick contacts. He has been eating and drinking fine. Review of Systems   Constitutional: Positive for chills. Negative for fever. HENT: Negative for congestion, rhinorrhea, trouble swallowing and voice change. Eyes: Negative for photophobia and visual disturbance. Respiratory: Negative for cough and shortness of breath. Cardiovascular: Negative for chest pain and palpitations. Gastrointestinal: Negative for abdominal pain, diarrhea, nausea and vomiting. Genitourinary: Negative for dysuria, enuresis, hematuria and urgency. Musculoskeletal: Negative for arthralgias and myalgias. Skin: Negative for rash and wound.    Neurological: Positive for syncope. Negative for dizziness and headaches. Psychiatric/Behavioral: Negative for behavioral problems and confusion. Physical Exam  Constitutional:       General: He is not in acute distress. Appearance: Normal appearance. He is not ill-appearing. Comments: Acromegaly. HENT:      Head: Normocephalic and atraumatic. Right Ear: External ear normal.      Left Ear: External ear normal.      Nose: Nose normal. No congestion or rhinorrhea. Mouth/Throat:      Mouth: Mucous membranes are moist.      Pharynx: Oropharynx is clear. Eyes:      Conjunctiva/sclera: Conjunctivae normal.      Pupils: Pupils are equal, round, and reactive to light. Cardiovascular:      Rate and Rhythm: Normal rate and regular rhythm. Pulses: Normal pulses. Heart sounds: Normal heart sounds. Pulmonary:      Effort: Pulmonary effort is normal. No respiratory distress. Breath sounds: Normal breath sounds. No wheezing or rales. Abdominal:      General: Abdomen is flat. Palpations: Abdomen is soft. Tenderness: There is no abdominal tenderness. There is no guarding or rebound. Musculoskeletal:         General: Normal range of motion. Cervical back: Normal range of motion and neck supple. Right lower leg: No edema. Left lower leg: No edema. Skin:     General: Skin is warm and dry. Capillary Refill: Capillary refill takes less than 2 seconds. Neurological:      General: No focal deficit present. Mental Status: He is alert and oriented to person, place, and time. Cranial Nerves: No cranial nerve deficit. Coordination: Coordination normal.   Psychiatric:         Mood and Affect: Mood normal.         Behavior: Behavior normal.        Procedures   EKG: This EKG is signed and interpreted by me. Normal sinus rhythm with ventricular rate of 69 bpm.  FL interval normal.  QTc not prolonged. PACs. Low voltage QRS.   No evidence of acute ST ovation MI. No significant changes compared to previous EKG on 5/17/2019. MDM   Patient is a 70-year-old male with past medical history of HIV, hyperlipidemia, hypertension, DVT and pituitary adenoma status postresection presenting to the emergency department due to syncope. Patient reportedly had 3 episodes of syncope a 45-minute time span prior to arrival to the ED. On arrival to the emergency department, patient was awake, alert and oriented x3. He was answering questions appropriately and following commands. Moving all 4 extremities without difficulty. No focal neurological deficits noted. Physical exam essentially benign. Work-up in the emergency department generally unremarkable. Cardiac work-up negative. No clinically significant lab abnormalities. CT head and significant with no evidence of acute anterior cranial process. Imaging study does make noted previous transsphenoidal partial piituitary gland /macroadenoma resection. Given patient's history and presentation with syncope at rest, he would benefit from admission for monitoring and further work-up. Spoke with Dr. Tammy Caldera who accepted the patient for admission. Work-up and results were discussed with the patient and he is agreeable to plan as well. Patient remained hemodynamically stable in the ED. ED Course as of 06/28/22 0735   Tue Jun 28, 2022   7850 Dr. Tammy Caldera accepted the patient for admission.  Okay to place on telemetry  [PP]      ED Course User Index  [PP] Jolynn aLra, DO     --------------------------------------------- PAST HISTORY ---------------------------------------------  Past Medical History:  has a past medical history of Cervical (neck) region somatic dysfunction, Cervicogenic headache, COPD (chronic obstructive pulmonary disease) ([facility] [address] DVT of lower extremity (deep venous thrombosis) ([facility] [address] GERD (gastroesophageal reflux disease), [facility] Hyperlipidemia, Hypertension, Muscle contraction headache, and Prostate enlargement. Past Surgical History:  has a past surgical history that includes Abdomen surgery (1970); lymphadenectomy (Left); Colonoscopy; Foot surgery (Bilateral); Ankle surgery (Right); hernia repair; Heel spur surgery; other surgical history (Left, 03/22/2019); and Hammer toe surgery (Left, 3/22/2019). Social History:  reports that he quit smoking about 3 years ago. His smoking use included cigarettes. He quit after 40.00 years of use. He has never used smokeless tobacco. He reports that he does not drink alcohol and does not use drugs. Family History: family history includes Breast Cancer in his mother; Colon Cancer in his mother; Diabetes in his father; Heart Disease in his father; [place] in his brother; Stroke in his father. The patients home medications have been reviewed.     Allergies: Cat hair extract, Other, Seasonal, and Ultram [tramadol hcl]    -------------------------------------------------- RESULTS -------------------------------------------------    LABS:  Results for orders placed or performed during the hospital encounter of 06/27/22   CBC with Auto Differential   Result Value Ref Range    WBC 7.9 4.5 - 11.5 E9/L    RBC 4.27 3.80 - 5.80 E12/L    Hemoglobin 13.8 12.5 - 16.5 g/dL    Hematocrit 40.4 37.0 - 54.0 %    MCV 94.6 80.0 - 99.9 fL    MCH 32.3 26.0 - 35.0 pg    MCHC 34.2 32.0 - 34.5 %    RDW 14.2 11.5 - 15.0 fL    Platelets 146 891 - 555 E9/L    MPV 9.3 7.0 - 12.0 fL    Neutrophils % 58.9 43.0 - 80.0 %    Immature Granulocytes % 0.3 0.0 - 5.0 %    Lymphocytes % 25.9 20.0 - 42.0 %    Monocytes % 7.4 2.0 - 12.0 %    Eosinophils % 6.6 (H) 0.0 - 6.0 %    Basophils % 0.9 0.0 - 2.0 %    Neutrophils Absolute 4.64 1.80 - 7.30 E9/L    Immature Granulocytes # 0.02 E9/L    Lymphocytes Absolute 2.04 1.50 - 4.00 E9/L    Monocytes Absolute 0.58 0.10 - 0.95 E9/L    Eosinophils Absolute 0.52 (H) 0.05 - 0.50 E9/L    Basophils Absolute 0.07 0.00 - 0.20 E9/L   Comprehensive Metabolic Panel w/ Reflex to MG   Result Value Ref Range    Sodium 136 132 - 146 mmol/L    Potassium reflex Magnesium 3.9 3.5 - 5.0 mmol/L    Chloride 103 98 - 107 mmol/L    CO2 24 22 - 29 mmol/L    Anion Gap 9 7 - 16 mmol/L    Glucose 101 (H) 74 - 99 mg/dL    BUN 16 6 - 23 mg/dL    CREATININE 1.3 (H) 0.7 - 1.2 mg/dL    GFR Non-African American 55 >=60 mL/min/1.73    GFR African American >60     Calcium 8.9 8.6 - 10.2 mg/dL    Total Protein 7.2 6.4 - 8.3 g/dL    Albumin 4.0 3.5 - 5.2 g/dL    Total Bilirubin 0.6 0.0 - 1.2 mg/dL    Alkaline Phosphatase 67 40 - 129 U/L    ALT 14 0 - 40 U/L    AST 23 0 - 39 U/L   Troponin   Result Value Ref Range    Troponin, High Sensitivity 17 (H) 0 - 11 ng/L   Brain Natriuretic Peptide   Result Value Ref Range    Pro-BNP 61 0 - 125 pg/mL   Troponin   Result Value Ref Range    Troponin, High Sensitivity 14 (H) 0 - 11 ng/L   EKG 12 Lead   Result Value Ref Range    Ventricular Rate 69 BPM    Atrial Rate 69 BPM    P-R Interval 196 ms    QRS Duration 92 ms    Q-T Interval 394 ms    QTc Calculation (Bazett) 422 ms    P Axis 22 degrees    R Axis 13 degrees    T Axis 30 degrees       RADIOLOGY:  XR CHEST PORTABLE   Final Result   No acute cardiopulmonary process. CT Head WO Contrast   Final Result   No acute intracranial abnormality. Previous transsphenoidal partial piituitary gland /macroadenoma resection.           ------------------------- NURSING NOTES AND VITALS REVIEWED ---------------------------  Date / Time Roomed:  6/27/2022 10:16 PM  ED Bed Assignment:  6606/3214-08    The nursing notes within the ED encounter and vital signs as below have been reviewed.      Patient Vitals for the past 24 hrs:   BP Temp Temp src Pulse Resp SpO2 Height Weight   06/28/22 0715 130/78 97.9 °F (36.6 °C) Oral 65 18 93 % -- --   06/28/22 0226 115/78 97.7 °F (36.5 °C) -- 64 16 -- -- --   06/28/22 0215 115/78 97.7 °F (36.5 °C) Oral 64 16 95 % -- --   06/28/22 0124 125/78 -- -- 69 18 96 % -- --   06/27/22 2318 -- -- -- 74 -- -- -- --   06/27/22 2225 137/87 -- -- -- -- -- -- --   06/27/22 2220 -- 98.3 °F (36.8 °C) Oral 71 18 94 % 6' 6\" (1.981 m) 285 lb (129.3 kg)       Oxygen Saturation Interpretation: Normal    ------------------------------------------ PROGRESS NOTES ------------------------------------------    Counseling:  I have spoken with the patient and discussed todays results, in addition to providing specific details for the plan of care and counseling regarding the diagnosis and prognosis. Their questions are answered at this time and they are agreeable with the plan of admission.    --------------------------------- ADDITIONAL PROVIDER NOTES ---------------------------------  Consultations:  Time: 6825. Spoke with Dr. Irene Berrios. Discussed case. They will admit the patient. This patient's ED course included: a personal history and physicial examination, re-evaluation prior to disposition, multiple bedside re-evaluations, IV medications, cardiac monitoring and continuous pulse oximetry    This patient has remained hemodynamically stable during their ED course. Diagnosis:  1. Syncope and collapse        Disposition:  Patient's disposition: Admit to telemetry  Patient's condition is stable.             Norma Gonzalez DO  Resident  06/28/22 9564

## 2022-06-28 NOTE — CARE COORDINATION
6/28/2022 1055 CM note: No covid testing. Met with patient for transition of care needs. Pt resides alone in a ground level Sr apartment with elevator access. He is fairly independent with ADLs and drives. He [facility] [address] is working on arranging [employer]. DME includes: straight cane and ww. PCP is Dr Marin Bell and [facility] and [facility]. Pt agreeable for [facility] to [facility] message left on transfer line and [facility]([phone]). Pt plans to return home at d/c and his friend will provide a ride.  Cuong BRUNER

## 2022-06-28 NOTE — ED TRIAGE NOTES
Pt was at [facility] [place], pt does not remember what happened, stated he heard people tell him to wake up.  Happened three times

## 2022-06-28 NOTE — PROGRESS NOTES
Physical Therapy    Physical Therapy Initial Evaluation/Plan of Care    Room #:  8046/1140-41  Patient Name: Aury Low  YOB: 1955  MRN: [id number]    Date of Service: 6/28/2022     Tentative placement recommendation: Outpatient Rehab  Equipment recommendation: Patient has needed equipment       Evaluating Physical Therapist: Chuy Manrique  #[id number]      Specific Provider Orders/Date/Referring Provider :  06/28/22 0830   PT eval and treat Start: 06/28/22 0830, End: 06/28/22 0830, ONE TIME, Standing Count: 1 Occurrences, R    Jesse Vo DO      Admitting Diagnosis:   Syncope and collapse [R55]    Admitted with    syncope    Surgery: none      Patient Active Problem List   Diagnosis    Sepsis ([address]    Prostate enlargement    HIV (human immunodeficiency virus infection) ([address]    DVT of lower extremity (deep venous thrombosis) ([address]    Hyperlipidemia    HIV (human immunodeficiency virus infection) ([address]    DVT of lower extremity (deep venous thrombosis) ([address]    Prostate enlargement    TEAGAN (acute kidney injury) ([address]    Hammertoe of left foot    Urinary retention    Bleeding    Pituitary mass ([address]    Syncope and collapse    CKD (chronic kidney disease) stage 2, GFR 60-89 ml/min    COPD without exacerbation ([address]    Cervical (neck) region somatic dysfunction    Cervicogenic headache    Muscle contraction headache        ASSESSMENT of Current Deficits Patient exhibits decreased strength, balance, endurance and coordination impairing functional mobility, transfers, gait , gait distance and tolerance to activity are barriers to d/c and require skilled intervention during hospital stay to attain pre hospital level of function. Decreased strength, balance and endurance  increases patient's risk for fall.   Patient wears bilateral afo's for weakness        PHYSICAL THERAPY  PLAN OF CARE       Physical therapy plan of care is established based on physician order,  patient diagnosis and clinical assessment    Current Treatment Recommendations:    -Standing Balance: Perform strengthening exercises in standing to promote motor control with or without upper extremity support   -Transfers: Provide instruction on proper hand and foot position for adequate transfer of weight onto lower extremities and use of gait device if needed and Cues for hand placement, technique and safety. Provide stabilization to prevent fall   -Gait: Gait training and Standing activities to improve: base of support, weight shift, weight bearing    -Endurance: Utilize Supervised activities to increase level of endurance to allow for safe functional mobility including transfers and gait   -Stairs: Stair training with instruction on proper technique and hand placement on rail    PT long term treatment goals are located in below grid    Patient and or family understand(s) diagnosis, prognosis, and plan of care. Frequency of treatments: Patient will be seen  daily.          Prior Level of Function: Patient ambulated independently    Rehab Potential: good    for baseline    Past medical history:   Past Medical History:   Diagnosis Date    Cervical (neck) region somatic dysfunction 5/9/2022    Cervicogenic headache 5/9/2022    [facility] [address] [facility]     DVT of lower extremity (deep venous thrombosis) ([facility] [address] 2007    post foot surgery      GERD (gastroesophageal reflux disease)     HIV (human immunodeficiency virus infection) ([facility] [address]     Hyperlipidemia     Hypertension     Muscle contraction headache 5/9/2022    Prostate enlargement      Past Surgical History:   Procedure Laterality Date    ABDOMEN SURGERY  1970    colon resection d/t paralytic ileus from blunt trauma to abdomen     ANKLE SURGERY Right     COLONOSCOPY      FOOT SURGERY Bilateral     hammertoe    HAMMER TOE SURGERY Left 3/22/2019    CONDYLECTOMY 4TH TOE HAMMERTOE CORRECTION  5TH TOE LEFT FOOT. V TO Y SKIN PLASTY 5TH METATARSAL PHALANGEAL JOINT LEFT FOOT performed by Olayinka Benítez., SUZANNA at [address]      LYMPHADENECTOMY Left     axilla & Rt side neck    OTHER SURGICAL HISTORY Left 03/22/2019    hammertoe correction 4th and 5th toes left foot, skin plasty 5th MPJ left foot       SUBJECTIVE:    Precautions: Activity as tolerated, falls and alarm ,    Social history: Patient lives alone in a apartment    with Elevator  to enter     Equipment owned: Clarice Castrejon,       [address]   How much difficulty turning over in bed?: None  How much difficulty sitting down on / standing up from a chair with arms?: None  How much difficulty moving from lying on back to sitting on side of bed?: None  How much help from another person moving to and from a bed to a chair?: None  How much help from another person needed to walk in hospital room?: A Little  How much help from another person for climbing 3-5 steps with a railing?: A Little  AM-PAC Inpatient Mobility Raw Score : 22  AM-PAC Inpatient T-Scale Score : 53.28  Mobility Inpatient CMS 0-100% Score: 20.91  Mobility Inpatient CMS G-Code Modifier : [address] cleared patient for PT evaluation. The admitting diagnosis and active problem list as listed above have been reviewed prior to the initiation of this evaluation. OBJECTIVE;   Initial Evaluation  Date: 6/28/2022 Treatment Date:     Short Term/ Long Term   Goals   Was pt agreeable to Eval/treatment? Yes  To be met in 2 days   Pain level   0/10        Bed Mobility    Rolling: Independent    Supine to sit:  Independent    Sit to supine: Independent    Scooting: Independent        Transfers Sit to stand: Supervision     Sit to stand: Independent     Ambulation    50 feet using  wheeled walker with Supervision    d/t drop foot and no afo's    100 feet using  wheeled walker with Independent    ROM Within functional limits    Increase range of motion 10% of affected joints    Strength BUE:  refer to OT eval  RLE:  3/5  LLE:  3/5  Increase strength in affected mm groups by 1/3 grade   Balance Sitting EOB:  good    Dynamic Standing:  fair with wheeled walker   Sitting EOB:  good    Dynamic Standing: good       Patient is Alert & Oriented x person, place, time and situation and follows directions    Sensation:  Patient  reports numbness/tingling left lower extremity     Edema:  no   Endurance: fair          Patient education  Patient educated on role of Physical Therapy, risks of immobility, safety and plan of care and  importance of mobility while in hospital      Patient response to education:   Pt verbalized understanding Pt demonstrated skill Pt requires further education in this area   Yes Partial Yes      Treatment:  Patient practiced and was instructed/facilitated in the following treatment: Patient   Sat edge of bed 10 minutes with Independent to increase dynamic sitting balance and activity tolerance. Therapeutic Exercises:  not performed      At end of session, patient sitting edge of bed with  call light and phone within reach,  all lines and tubes intact, nursing notified. Patient would benefit from continued skilled Physical Therapy to improve functional independence and quality of life. Patient's/ family goals   home    Time in  65  Time out  330    Total Treatment Time  0 minutes    Evaluation time includes thorough review of current medical information, gathering information on past medical history/social history and prior level of function, completion of standardized testing/informal observation of tasks, assessment of data, and development of Plan of care and goals.      CPT codes:  Low Complexity PT evaluation (16495)  No treatment billed    Ria Doe, PT

## 2022-06-29 VITALS
OXYGEN SATURATION: 97 % | WEIGHT: 288.06 LBS | HEART RATE: 83 BPM | SYSTOLIC BLOOD PRESSURE: 143 MMHG | DIASTOLIC BLOOD PRESSURE: 93 MMHG | HEIGHT: 78 IN | BODY MASS INDEX: 33.33 KG/M2 | RESPIRATION RATE: 20 BRPM | TEMPERATURE: 97.5 F

## 2022-06-29 LAB
ANION GAP SERPL CALCULATED.3IONS-SCNC: 8 MMOL/L (ref 7–16)
BASOPHILS ABSOLUTE: 0.05 E9/L (ref 0–0.2)
BASOPHILS RELATIVE PERCENT: 0.7 % (ref 0–2)
BUN BLDV-MCNC: 17 MG/DL (ref 6–23)
CALCIUM SERPL-MCNC: 9 MG/DL (ref 8.6–10.2)
CHLORIDE BLD-SCNC: 108 MMOL/L (ref 98–107)
CHOLESTEROL, TOTAL: 156 MG/DL (ref 0–199)
CO2: 21 MMOL/L (ref 22–29)
CREAT SERPL-MCNC: 1.3 MG/DL (ref 0.7–1.2)
EOSINOPHILS ABSOLUTE: 0.55 E9/L (ref 0.05–0.5)
EOSINOPHILS RELATIVE PERCENT: 8.2 % (ref 0–6)
GFR AFRICAN AMERICAN: >60
GFR NON-AFRICAN AMERICAN: 55 ML/MIN/1.73
GLUCOSE BLD-MCNC: 90 MG/DL (ref 74–99)
HCT VFR BLD CALC: 42.7 % (ref 37–54)
HDLC SERPL-MCNC: 29 MG/DL
HEMOGLOBIN: 14.1 G/DL (ref 12.5–16.5)
IMMATURE GRANULOCYTES #: 0.02 E9/L
IMMATURE GRANULOCYTES %: 0.3 % (ref 0–5)
LDL CHOLESTEROL CALCULATED: 109 MG/DL (ref 0–99)
LYMPHOCYTES ABSOLUTE: 1.64 E9/L (ref 1.5–4)
LYMPHOCYTES RELATIVE PERCENT: 24.4 % (ref 20–42)
MAGNESIUM: 2 MG/DL (ref 1.6–2.6)
MCH RBC QN AUTO: 32.1 PG (ref 26–35)
MCHC RBC AUTO-ENTMCNC: 33 % (ref 32–34.5)
MCV RBC AUTO: 97.3 FL (ref 80–99.9)
MONOCYTES ABSOLUTE: 0.38 E9/L (ref 0.1–0.95)
MONOCYTES RELATIVE PERCENT: 5.7 % (ref 2–12)
NEUTROPHILS ABSOLUTE: 4.08 E9/L (ref 1.8–7.3)
NEUTROPHILS RELATIVE PERCENT: 60.7 % (ref 43–80)
PDW BLD-RTO: 14.3 FL (ref 11.5–15)
PHOSPHORUS: 2.6 MG/DL (ref 2.5–4.5)
PLATELET # BLD: 210 E9/L (ref 130–450)
PMV BLD AUTO: 9.4 FL (ref 7–12)
POTASSIUM SERPL-SCNC: 4.2 MMOL/L (ref 3.5–5)
RBC # BLD: 4.39 E12/L (ref 3.8–5.8)
SODIUM BLD-SCNC: 137 MMOL/L (ref 132–146)
TRIGL SERPL-MCNC: 88 MG/DL (ref 0–149)
TSH SERPL DL<=0.05 MIU/L-ACNC: 1.06 UIU/ML (ref 0.27–4.2)
VLDLC SERPL CALC-MCNC: 18 MG/DL
WBC # BLD: 6.7 E9/L (ref 4.5–11.5)

## 2022-06-29 PROCEDURE — 80048 BASIC METABOLIC PNL TOTAL CA: CPT

## 2022-06-29 PROCEDURE — 84443 ASSAY THYROID STIM HORMONE: CPT

## 2022-06-29 PROCEDURE — 36415 COLL VENOUS BLD VENIPUNCTURE: CPT

## 2022-06-29 PROCEDURE — 80061 LIPID PANEL: CPT

## 2022-06-29 PROCEDURE — 6370000000 HC RX 637 (ALT 250 FOR IP): Performed by: INTERNAL MEDICINE

## 2022-06-29 PROCEDURE — 6360000002 HC RX W HCPCS: Performed by: INTERNAL MEDICINE

## 2022-06-29 PROCEDURE — 83735 ASSAY OF MAGNESIUM: CPT

## 2022-06-29 PROCEDURE — 84100 ASSAY OF PHOSPHORUS: CPT

## 2022-06-29 PROCEDURE — 94640 AIRWAY INHALATION TREATMENT: CPT

## 2022-06-29 PROCEDURE — 85025 COMPLETE CBC W/AUTO DIFF WBC: CPT

## 2022-06-29 RX ORDER — TOPIRAMATE 25 MG/1
TABLET ORAL
Qty: 372 TABLET | Refills: 1 | COMMUNITY
Start: 2022-06-29

## 2022-06-29 RX ADMIN — IPRATROPIUM BROMIDE 0.5 MG: 0.5 SOLUTION RESPIRATORY (INHALATION) at 05:44

## 2022-06-29 RX ADMIN — IPRATROPIUM BROMIDE 0.5 MG: 0.5 SOLUTION RESPIRATORY (INHALATION) at 09:07

## 2022-06-29 RX ADMIN — MONTELUKAST 10 MG: 10 TABLET, FILM COATED ORAL at 10:41

## 2022-06-29 RX ADMIN — APIXABAN 5 MG: 5 TABLET, FILM COATED ORAL at 10:41

## 2022-06-29 RX ADMIN — PANTOPRAZOLE SODIUM 40 MG: 40 TABLET, DELAYED RELEASE ORAL at 06:05

## 2022-06-29 RX ADMIN — POTASSIUM CHLORIDE AND SODIUM CHLORIDE: 900; 150 INJECTION, SOLUTION INTRAVENOUS at 04:40

## 2022-06-29 RX ADMIN — LISINOPRIL 20 MG: 20 TABLET ORAL at 10:40

## 2022-06-30 NOTE — DISCHARGE SUMMARY
[address]                               DISCHARGE SUMMARY    PATIENT NAME: Brad Reyes                  :        1955  MED REC NO:   [id number]                            ROOM:       3022  ACCOUNT NO:   [de-identified]                           ADMIT DATE: 2022  PROVIDER:     Gia Dick,                   DISCHARGE DATE:  2022    ADMITTING DIAGNOSIS:  Near-syncopal episode, undetermined etiology. SECONDARY DISCHARGE DIAGNOSES:  History of pituitary macroadenoma with  acromegaly, status post resection three years ago at [facility]; recurrent cervicogenic headaches, being followed as outpatient  by ENT and Neurology team; prior history of DVT; HIV; essential  hypertension; hyperlipidemia. COMPLICATIONS:  No complications. OPERATIONS:  No operation. CONSULTATION OBTAINED:  With no one. ADMITTING PHYSICIANS:  Dr. Grayson Pathak and Dr. Racheal Gandhi. CHIEF COMPLAINT AND HISTORY OF CHIEF COMPLAINT:  This is a 77-year-old  white male. The patient presented to the hospital here with a series of  syncope versus near-syncopal episodes over 45 minutes. The patient has  no inciting event and states that he was feeling fine prior to these  episodes. The patient has been calling [employer] at the independent living  facility. While he was there, the patient states that he blacked out  three times during the 45-minute period. These were what he described  as near-syncopal episodes where everything seemed gray. He was  questionable to deny any true loss of consciousness. The patient did  have extensive surgical history, status post resection of  pituitary macroadenoma at [facility] by Dr. Yariel Kamara  approximately three years ago. The patient has been following routinely  with ENT and Neurology locally in the setting of persistent headaches  afterward.   The patient denies any change in headache capability. The  patient was admitted to the hospital at this time. PAST MEDICAL HISTORY:  Positive for usual childhood diseases, history of  cervical diskogenic disease, COPD, DVT, HIV, hyperlipidemia,  hypertension, muscular contraction, prostatic enlargement. PHYSICAL EXAMINATION:  VITAL SIGNS:  Show blood pressure to be 130/78, pulse 65, respirations  18, afebrile. GENERAL APPEARANCE:  A 69-year-old white male who is alert, responsive;  oriented to person, place, and time. HEENT:  Normal.  LUNGS:  Clear. HEART:  Fairly regular without significant ectopics. There was no S3. No S4.  ABDOMEN:  Soft. EXTREMITIES:  Currently without any cyanosis, clubbing, or edema. NEURO:  Grossly intact. LABORATORY DATA:  While the patient was in the hospital, he had the  following laboratory studies performed:  TSH was normal.  Cholesterol  156. HDL and LDL were 29 and 109 respectively. Electrolytes were  stable. CBC and BMP were normal.    DIAGNOSTIC DATA:  EKG did show normal sinus rhythm. Echocardiogram  showed no acute findings. Ultrasound of the carotids showed less than  50% stenosis bilaterally. MRI of the brain did show postsurgical  finding of a transsphenoid partial resection of pituitary adenoma. Hypofunctioning nodule seen in the left side of the sella measuring 1.5  x 1 x 1.2, most likely residual tumor not significantly changed. HOSPITAL COURSE:  During the hospital stay, the patient was admitted  directly to the hospital to the general telemetry floor. The patient  was admitted under the service of Dr. Macy Stinson and Dr. [facility]. The patient  was initially seen by Dr. FULLER DON at this time who proceeded with further  workup at this time. The patient was continued on the medications at  this time. The exact etiology of the syncopal episode was not disclosed  at this time. On further questioning him, he felt that these were most  possibly near-syncopal episode.   His vital signs remained stable. MRI  of the brain was obtained and lab work was satisfactory. We did  encourage him to have further workup done. The patient was agreeable to  follow up closely with the [place] which he does follow up on an outpatient  basis. Because of these findings, the patient was ambulatory at this  time without any further symptoms. He was felt stable enough to be  discharged home and followed up closely on an outpatient basis. Subsequently, the patient was discharged home by private car on  06/29/2022. At the time of discharge, revealed the following:  His blood pressure  143/93, temperature 97.5, pulse 83, respirations 20, O2 sat 97%, height  6 feet 6 inches, weight 281. The patient will be discharged on apixaban 5 mg b.i.d. which he has been  on, Astelin nasal spray one daily, Claritin-D q.12, hydrochlorothiazide  25 daily, lisinopril 20 daily, Singulair 10 mg at bedtime, Prilosec 40  mg daily, Crestor 40 daily, Spiriva two puffs daily, Topamax one b.i.d.,  Triumeq 600/50/300 nightly. The patient was instructed not to drive at this time. The patient will  be followed up by his primary care doctor, Dr. Claire Don, on an outpatient  basis and was encouraged to follow up with Neurology at the [place]. No  significant dysrhythmia was noted at this time. The patient otherwise  is stable upon discharge. On the day of discharge, more than 40 minutes were spent. More than 50%  of the time spent face-to-face with the patient discussing plan of care  and treatment at this time.   The patient was instructed to return if any  problems arise and the echocardiogram at that time did appear to be  normal.        Cassie Stockton DO    D: 06/29/2022 14:39:37       T: 06/30/2022 3:57:32     [email or web address]  Job#: [id number]     Doc#: [id number]    CC:

## 2022-07-06 ENCOUNTER — SCHEDULED TELEPHONE ENCOUNTER (OUTPATIENT)
Dept: NEUROLOGY | Age: 67
End: 2022-07-06
Payer: COMMERCIAL

## 2022-07-06 DIAGNOSIS — M47.812 CERVICAL SPONDYLOSIS: ICD-10-CM

## 2022-07-06 DIAGNOSIS — G44.221 CHRONIC TENSION-TYPE HEADACHE, INTRACTABLE: Primary | ICD-10-CM

## 2022-07-06 DIAGNOSIS — Z86.018 HISTORY OF PITUITARY ADENOMA: ICD-10-CM

## 2022-07-06 DIAGNOSIS — G44.86 CERVICOGENIC HEADACHE: ICD-10-CM

## 2022-07-06 PROCEDURE — 1123F ACP DISCUSS/DSCN MKR DOCD: CPT | Performed by: PSYCHIATRY & NEUROLOGY

## 2022-07-06 PROCEDURE — 99214 OFFICE O/P EST MOD 30 MIN: CPT | Performed by: PSYCHIATRY & NEUROLOGY

## 2022-07-06 NOTE — PROGRESS NOTES
Pr-14  4.2 NEUROLOGY  [address] [facility] [address]  Dept: [phone]  Loc: [phone]    Telephone follow up visit      Date of Visit:  7/6/2022    CC: Posttraumatic headaches, status post pituitary adenoma resection. Chronic muscle contraction, cervicogenic- related headaches. Consent:  The patient and/or health care decision maker is aware that that he may receive a bill for this telephone service, depending on his insurance coverage, and has provided verbal consent to proceed: Yes  My patient is aware that they will need a follow-up visit (in-person or virtually) at the appropriate time indicated for continued medications. Further, my patient is aware that when this acute crisis has lifted, they will be expected to return for an in-person visit and all elements of standard local and hospital guidelines in order to continue this medication. HPI: 49-year-old man seen on 5/9/2022 for complaint of posttraumatic headache status post pituitary adenoma resection with history of vasomotor rhinitis and abnormal brain scan in 2017 indicating possible residual pituitary adenoma. He was recommended a trial of topiramate 25 mg at bedtime titrated to 50 mg twice daily if clinically indicated. Antidepressant pain medications for headache were felt to be contraindicated due to his history of intolerable dry mouth which he experienced with melatonin. He was also advised to stop taking Benadryl twice daily due to long-term side effects. He reports recent fainting or prefaint symptoms and has been in the hospital recently. He will soon be seeing a neurologist at the [facility] for cause of syncope. His family doctor has not ordered any cardiac testing for him or other cardiac referral for example, other than what was included during his recent hospital stay.       Hospital discharge summary has been reviewed of Opal Nuñez, John Paul Butts, 6/29/2022, Dr. Randol Severs. Near syncopal episode, undetermined etiology. History of pituitary microadenoma with acromegaly status postresection 3 years ago at [facility], recurrent cervicogenic headaches. Note: He was re-ordered topiramate but never picked up the prescription, [facility]. Please refer to prior Neurology consult letter of 5/9/2022 for additional information if needed. ROS, family/social history, medication/allergy list: Reviewed, updated. Headache pain is unchanged. Appropriate analgesia with current medications regimen: no, never tried topiramate-stated never rcvd. From pharmacy  Change in quality of symptoms:no. Medication side effects:not applicable -never tried topiramate    Recent diagnostic testing: cervical spine X-ray, 5/9/22:   No prevertebral edema.  No significant subluxation.  Moderate diffuse   degenerative disc disease changes greatest at C4 and C6.  Moderate diffuse   facet arthritis.  Ligamentous nuchal calcification.  Bony densities or   calcification posterior to lower cervical spinous processes are smoothly   marginated and likely due to old fractures or adjacent ligamentous   calcifications. Sueellen Saldaña are thought to be incidental.  Mild bony foraminal   narrowing on the left greatest at C6-7.  Mild right bony foraminal narrowing   greatest at C4-5.  No bony destruction or acute fracture.  Surgical clips are   present in the posterior right lower neck     Lab data: reviewed from 6/29/22       Past Medical History: Reviewed    Past Surgical History: Reviewed     Home Medications: Reviewed    Allergies: Reviewed     Social History: Reviewed     REVIEW OF SYSTEMS:     Bethel Staley denies fever/chills, chest pain, shortness of breath, new bowel or bladder complaints. All other review of systems was negative. Reports never tried topiramate.     PHYSICAL EXAMINATION:     General:       A & O x3    Speech/Language: mild enunciation difficulty, chronic    No focal sensorimotor deficits reported,      Lungs:    Breathing:Normal expansion. Clear to auscultation. No rales, rhonchi, or wheezing. Impression/Plan:    1. Cervicogenic and chronic muscle contraction headaches. History of cervical spondylosis underlying the above. 2. Generalized anxiety. 3. History of presyncope. 4. He is encouraged to try the topiramate prescription for headache prophylaxis, I.e., on file at [facility]. 5. He is recommended to follow-up with his primary medical provider and ENT as needed otherwise. Patient advised regarding steps to help prevent the spread of COVID-19   SOURCE - https://woody-cespedes.info/. html     1-Stay home except to get medical care  2-Clean your hands often for atleast 20 secnds, avoid touching: Avoid touching your eyes, nose, and mouth with unwashed hands. 3-Seek medical attention: Seek prompt medical attention if your illness is worsening (e.g., difficulty breathing). Call you doctor first.  3-Wear a facemask if you are sick   4-Cover your coughs and sneezes        I affirm this is a Patient Initiated Episode with an Established Patient who has not had a related appointment within my department in the past 7 days or scheduled within the next 24 hours.     Total Time: minutes: 11-20 minutes   Patient location: Residence  Physician location: Office    Note: not billable if this call serves to triage the patient into an appointment for the relevant concern    Jose Manuel Rabago MD  Neurology & Clinical Neurophysiology

## 2022-07-11 ENCOUNTER — OFFICE VISIT (OUTPATIENT)
Dept: ENT CLINIC | Age: 67
End: 2022-07-11
Payer: OTHER GOVERNMENT

## 2022-07-11 VITALS
BODY MASS INDEX: 33.29 KG/M2 | HEART RATE: 57 BPM | DIASTOLIC BLOOD PRESSURE: 74 MMHG | HEIGHT: 78 IN | SYSTOLIC BLOOD PRESSURE: 130 MMHG

## 2022-07-11 DIAGNOSIS — J30.9 ALLERGIC RHINITIS, UNSPECIFIED SEASONALITY, UNSPECIFIED TRIGGER: ICD-10-CM

## 2022-07-11 DIAGNOSIS — J34.2 DNS (DEVIATED NASAL SEPTUM): ICD-10-CM

## 2022-07-11 DIAGNOSIS — J30.0 VASOMOTOR RHINITIS: Primary | ICD-10-CM

## 2022-07-11 DIAGNOSIS — J34.3 HYPERTROPHY OF BOTH INFERIOR NASAL TURBINATES: ICD-10-CM

## 2022-07-11 PROCEDURE — G8427 DOCREV CUR MEDS BY ELIG CLIN: HCPCS | Performed by: OTOLARYNGOLOGY

## 2022-07-11 PROCEDURE — 1111F DSCHRG MED/CURRENT MED MERGE: CPT | Performed by: OTOLARYNGOLOGY

## 2022-07-11 PROCEDURE — 1036F TOBACCO NON-USER: CPT | Performed by: OTOLARYNGOLOGY

## 2022-07-11 PROCEDURE — 3017F COLORECTAL CA SCREEN DOC REV: CPT | Performed by: OTOLARYNGOLOGY

## 2022-07-11 PROCEDURE — 99213 OFFICE O/P EST LOW 20 MIN: CPT | Performed by: OTOLARYNGOLOGY

## 2022-07-11 PROCEDURE — 1123F ACP DISCUSS/DSCN MKR DOCD: CPT | Performed by: OTOLARYNGOLOGY

## 2022-07-11 PROCEDURE — G8417 CALC BMI ABV UP PARAM F/U: HCPCS | Performed by: OTOLARYNGOLOGY

## 2022-07-11 ASSESSMENT — ENCOUNTER SYMPTOMS
TROUBLE SWALLOWING: 0
VOMITING: 0
RHINORRHEA: 0
COUGH: 0
SORE THROAT: 0
VOICE CHANGE: 0
SHORTNESS OF BREATH: 0

## 2022-07-11 NOTE — PROGRESS NOTES
[facility] Otolaryngology  Dr. Cameron Cisneros. Jaqui Pires. Ms.Ed        Patient Name:  Kelsy Peterson  :  1955     CHIEF C/O:    Chief Complaint   Patient presents with    Follow-up     4mo f/u allergies and sinus       HISTORY OBTAINED FROM:  patient    HISTORY OF PRESENT ILLNESS:       Sowmya Kothari is a 74y.o. year old male, here today for follow up of history of seasonal allergies having some itching in the nose gets relief with nasal saline. No current complaints of new epistaxis headaches or vision changes, patient is tolerating medical therapy at this time. No history of recurrent sinusitis requiring antibiotic therapy.       Past Medical History:   Diagnosis Date    Cervical (neck) region somatic dysfunction 2022    Cervicogenic headache 2022    COPD (chronic obstructive pulmonary disease) (HCC)     DVT of lower extremity (deep venous thrombosis) ([facility] 2007    post foot surgery      GERD (gastroesophageal reflux disease)     HIV (human immunodeficiency virus infection) ([facility] [address]     Hyperlipidemia     Hypertension     Muscle contraction headache 2022    Prostate enlargement      Past Surgical History:   Procedure Laterality Date    ABDOMEN SURGERY      colon resection d/t paralytic ileus from blunt trauma to abdomen     ANKLE SURGERY Right     COLONOSCOPY      FOOT SURGERY Bilateral     hammertoe    HAMMER TOE SURGERY Left 3/22/2019    CONDYLECTOMY 4TH TOE HAMMERTOE CORRECTION  5TH TOE LEFT FOOT. V TO Y SKIN PLASTY 5TH METATARSAL PHALANGEAL JOINT LEFT FOOT performed by Libertad Calderón DPM at  [facility] Left     axilla & Rt side neck    OTHER SURGICAL HISTORY Left 2019    hammertoe correction 4th and 5th toes left foot, skin plasty 5th MPJ left foot       Current Outpatient Medications:     topiramate (TOPAMAX) 25 MG tablet, START 1 TABLET BY MOUTH EVERY NIGHT AT BEDTIME FOR 1 WEEK, THEN 1 TWICE DAILY FOR 1 WEEK, THEN 2 TWICE DAILY DAILY IF NEEDED, Disp: 372 tablet, Rfl: 1    apixaban (ELIQUIS) 5 MG TABS tablet, TAKE ONE TABLET BY MOUTH TWICE A DAY AS DIRECTED BY THE [facility] ([phone] [id number]), Disp: , Rfl:     montelukast (SINGULAIR) 10 MG tablet, Take 1 tablet by mouth daily, Disp: 30 tablet, Rfl: 3    Loratadine-Pseudoephedrine (CLARITIN-D 12 HOUR PO), Take by mouth, Disp: , Rfl:     Aromatic Inhalants (VICKS VAPOR IN), Inhale into the lungs In a machine to help him breath, Disp: , Rfl:     Hypertonic Nasal Wash (SINUS RINSE) PACK, 1 kit by Nasal route daily, Disp: 1 kit, Rfl: 1    azelastine (ASTELIN) 0.1 % nasal spray, 2 sprays by Nasal route 2 times daily Use in each nostril as directed, Disp: 3 Bottle, Rfl: 2    hydroCHLOROthiazide (HYDRODIURIL) 25 MG tablet, Take 25 mg by mouth daily, Disp: , Rfl:     lisinopril (PRINIVIL;ZESTRIL) 20 MG tablet, Take 20 mg by mouth daily, Disp: , Rfl:     sodium chloride (OCEAN, BABY AYR) 0.65 % nasal spray, 2 sprays by Nasal route as needed for Congestion (5 times daily), Disp: , Rfl:     fluticasone (FLONASE) 50 MCG/ACT nasal spray, 2 sprays by Each Nostril route daily, Disp: , Rfl:     tiotropium (SPIRIVA RESPIMAT) 1.25 MCG/ACT AERS inhaler, Inhale 2 puffs into the lungs daily, Disp: , Rfl:     omeprazole (PRILOSEC) 20 MG delayed release capsule, Take 20 mg by mouth daily, Disp: , Rfl:     Abacavir-Dolutegravir-Lamivud (TRIUMEQ) 600- MG TABS, Take 1 tablet by mouth nightly, Disp: , Rfl:     rosuvastatin (CRESTOR) 40 MG tablet, Take 40 mg by mouth every evening, Disp: , Rfl:   Cat hair extract, Other, Seasonal, and Ultram [tramadol hcl]  Social History     Tobacco Use    Smoking status: Former Smoker     Years: 40.00     Types: Cigarettes     Quit date: 1/1/2019     Years since quitting: 3.5    Smokeless tobacco: Never Used   Substance Use Topics    Alcohol use: No    Drug use: No     Family History   Problem Relation Age of Onset    Breast Cancer Mother     Colon Cancer Mother     Heart Disease Father     Stroke Father     Diabetes Father     Lung Cancer Brother        Review of Systems   Constitutional: Negative for chills and fever. HENT: Negative for congestion, ear discharge, hearing loss, postnasal drip, rhinorrhea, sore throat, trouble swallowing and voice change. Respiratory: Negative for cough and shortness of breath. Cardiovascular: Negative for chest pain and palpitations. Gastrointestinal: Negative for vomiting. Skin: Negative for rash. Allergic/Immunologic: Negative for environmental allergies. Neurological: Negative for dizziness and headaches. Hematological: Does not bruise/bleed easily. All other systems reviewed and are negative. /74 (Site: Right Upper Arm, Position: Sitting, Cuff Size: Medium Adult)   Pulse 57   Ht 6' 6\" (1.981 m)   BMI 33.29 kg/m²   Physical Exam  Vitals and nursing note reviewed. Constitutional:       Appearance: He is well-developed. HENT:      Head: Normocephalic. No contusion, masses or laceration. Jaw: No tenderness or swelling. Right Ear: Tympanic membrane, ear canal and external ear normal. There is no impacted cerumen. Left Ear: Tympanic membrane, ear canal and external ear normal. There is no impacted cerumen. Nose: No septal deviation, congestion or rhinorrhea. Right Nostril: No epistaxis. Left Nostril: No epistaxis. Right Turbinates: Enlarged. Left Turbinates: Enlarged. Mouth/Throat:      Mouth: Mucous membranes are moist. No oral lesions. Dentition: No gum lesions. Pharynx: No oropharyngeal exudate or posterior oropharyngeal erythema. Eyes:      Pupils: Pupils are equal, round, and reactive to light. Neck:      Thyroid: No thyromegaly. Trachea: No tracheal deviation. Cardiovascular:      Rate and Rhythm: Normal rate. Pulmonary:      Effort: Pulmonary effort is normal. No respiratory distress.    Musculoskeletal: General: Normal range of motion. Cervical back: Normal range of motion. Lymphadenopathy:      Cervical: No cervical adenopathy. Skin:     General: Skin is warm. Findings: No erythema. Neurological:      Mental Status: He is alert. Cranial Nerves: No cranial nerve deficit. IMPRESSION/PLAN:  Continue astelin, flonase, nasal saline and singuliar  Follow up in 6 months    Dr. Purnima Castro.  Otolaryngology Facial Plastic Surgery  :  [facility]  Associate Clinical Professor:  Divina Jean, [facility]

## 2022-07-13 VITALS
WEIGHT: 300 LBS | OXYGEN SATURATION: 94 % | BODY MASS INDEX: 34.71 KG/M2 | TEMPERATURE: 97.6 F | HEART RATE: 87 BPM | SYSTOLIC BLOOD PRESSURE: 128 MMHG | HEIGHT: 78 IN | DIASTOLIC BLOOD PRESSURE: 70 MMHG

## 2022-07-13 RX ORDER — PRENATAL VIT 91/IRON/FOLIC/DHA 28-975-200
COMBINATION PACKAGE (EA) ORAL
COMMUNITY
Start: 2022-03-29

## 2022-07-13 RX ORDER — CARBOXYMETHYLCELLULOSE SODIUM 5 MG/ML
SOLUTION/ DROPS OPHTHALMIC
COMMUNITY
Start: 2022-03-07

## 2022-07-13 RX ORDER — TESTOSTERONE 16.2 MG/G
GEL TRANSDERMAL
COMMUNITY
Start: 2022-06-03

## 2022-07-13 RX ORDER — KETOCONAZOLE 20 MG/G
CREAM TOPICAL
COMMUNITY
Start: 2022-04-12

## 2022-09-27 ENCOUNTER — OFFICE VISIT (OUTPATIENT)
Dept: PRIMARY CARE CLINIC | Age: 67
End: 2022-09-27
Payer: COMMERCIAL

## 2022-09-27 VITALS
WEIGHT: 283 LBS | BODY MASS INDEX: 32.74 KG/M2 | OXYGEN SATURATION: 97 % | HEART RATE: 75 BPM | DIASTOLIC BLOOD PRESSURE: 76 MMHG | HEIGHT: 78 IN | SYSTOLIC BLOOD PRESSURE: 138 MMHG | TEMPERATURE: 97.5 F

## 2022-09-27 DIAGNOSIS — N13.8 BENIGN PROSTATIC HYPERPLASIA WITH URINARY OBSTRUCTION: Primary | ICD-10-CM

## 2022-09-27 DIAGNOSIS — N40.1 BENIGN PROSTATIC HYPERPLASIA WITH URINARY OBSTRUCTION: Primary | ICD-10-CM

## 2022-09-27 DIAGNOSIS — Z91.81 AT HIGH RISK FOR FALLS: ICD-10-CM

## 2022-09-27 PROCEDURE — 99202 OFFICE O/P NEW SF 15 MIN: CPT | Performed by: FAMILY MEDICINE

## 2022-09-27 PROCEDURE — 1123F ACP DISCUSS/DSCN MKR DOCD: CPT | Performed by: FAMILY MEDICINE

## 2022-09-27 RX ORDER — ACETAMINOPHEN 325 MG/1
650 TABLET ORAL
COMMUNITY
Start: 2019-06-05

## 2022-09-27 RX ORDER — LORATADINE 10 MG/1
10 TABLET ORAL
COMMUNITY
Start: 2022-08-03

## 2022-09-27 SDOH — ECONOMIC STABILITY: FOOD INSECURITY: WITHIN THE PAST 12 MONTHS, THE FOOD YOU BOUGHT JUST DIDN'T LAST AND YOU DIDN'T HAVE MONEY TO GET MORE.: NEVER TRUE

## 2022-09-27 SDOH — ECONOMIC STABILITY: FOOD INSECURITY: WITHIN THE PAST 12 MONTHS, YOU WORRIED THAT YOUR FOOD WOULD RUN OUT BEFORE YOU GOT MONEY TO BUY MORE.: NEVER TRUE

## 2022-09-27 ASSESSMENT — PATIENT HEALTH QUESTIONNAIRE - PHQ9
2. FEELING DOWN, DEPRESSED OR HOPELESS: 0
SUM OF ALL RESPONSES TO PHQ QUESTIONS 1-9: 0
SUM OF ALL RESPONSES TO PHQ QUESTIONS 1-9: 0
SUM OF ALL RESPONSES TO PHQ9 QUESTIONS 1 & 2: 0
SUM OF ALL RESPONSES TO PHQ QUESTIONS 1-9: 0
1. LITTLE INTEREST OR PLEASURE IN DOING THINGS: 0
SUM OF ALL RESPONSES TO PHQ QUESTIONS 1-9: 0

## 2022-09-27 ASSESSMENT — SOCIAL DETERMINANTS OF HEALTH (SDOH): HOW HARD IS IT FOR YOU TO PAY FOR THE VERY BASICS LIKE FOOD, HOUSING, MEDICAL CARE, AND HEATING?: NOT HARD AT ALL

## 2022-09-27 NOTE — PROGRESS NOTES
[facility] [address] [facility] HX   DVT. IN RIGHT  . HIV   TREATED  [address] [facility] [address] (:  1955) is a 74 y.o. male,New patient, here for evaluation of the following chief complaint(s):  Benign Prostatic Hypertrophy (Would like a referral to urologist)         ASSESSMENT/PLAN:  1. Benign prostatic hyperplasia with urinary obstruction  2. At high risk for falls    No follow-ups on file.          Subjective   SUBJECTIVE/OBJECTIVE:  Benign Prostatic Hypertrophy    Hypertension:  Chronicity  [] New  [] Recurrent  [x] chronic    Onset  [] Today [] Yesterday  [] in the past 7 days  [] 1 to 4 weeks ago [] more than 1 month ago  [x] more than 1 year ago    Progression since onset  [] Unchanged [] Resolved [x] gradually improving [] rapidly improving [] radually worsening  [] rapidly worsening  [] waxing / waning    Condition status  [x] Controlled  [] Uncontrolled  [] Resistant    Associated symptoms  [] Anxiety  [] malaise/fatigue [] peripheral edema  [] blurred visio  [] neck pain  [] PND  [] chest pain   [] Orthopnea   [] shortness of breath   [] Headaches  [] Palpitations  [] Sweats     Agents associated with hypertension  [] no associated agents []Decongestants [] oral contraceptives  [] Amphetamines  [] Estrogens   [] steroids  []Anorectics   [] NSAIDs  [] thyroid hormones    CAD risks  [] no known risk factors [] Hyperhomocysteinemia   [] sedentary lifestyle  [] diabetes mellitus  [] male gender  []smoking/tobacco exposure     [] dyslipidemia  []Obesity   [] Stress    [] family history  []post-menopausal state    Past treatments  [] Nothing  [] beta blockers [] direct vasodilators   [] ACE inhibitors [] calcium channel blockers  [x] Diuretics   []alpha 1 blockers [] central alpha agonists  [] lifestyle changes [] angiotensin blockers    Improvement on treatment  [] no improvement [] Mild  [] Moderate  [x] Significant    Compliance problems  [x] no compliance problems  [] Exercise Neurological:      Mental Status: He is alert. Psychiatric:         Mood and Affect: Mood normal.                    An electronic signature was used to authenticate this note.     --Arik Uribe MD

## 2023-01-09 LAB
AVERAGE GLUCOSE: NORMAL
BILIRUBIN, URINE: NORMAL
BLOOD, URINE: NORMAL
CHOLESTEROL, TOTAL: NORMAL
CHOLESTEROL/HDL RATIO: NORMAL
CLARITY: NORMAL
COLOR: NORMAL
GLUCOSE URINE: NORMAL
HBA1C MFR BLD: NORMAL %
HDLC SERPL-MCNC: NORMAL MG/DL
KETONES, URINE: NORMAL
LDL CHOLESTEROL CALCULATED: NORMAL
LEUKOCYTE ESTERASE, URINE: NORMAL
NITRITE, URINE: NORMAL
NONHDLC SERPL-MCNC: NORMAL MG/DL
PH UA: NORMAL
PROTEIN UA: NORMAL
SPECIFIC GRAVITY, URINE: NORMAL
TRIGL SERPL-MCNC: NORMAL MG/DL
TSH SERPL DL<=0.05 MIU/L-ACNC: NORMAL M[IU]/L
UROBILINOGEN, URINE: NORMAL
VLDLC SERPL CALC-MCNC: NORMAL MG/DL

## 2023-01-18 ENCOUNTER — TELEPHONE (OUTPATIENT)
Dept: ENT CLINIC | Age: 68
End: 2023-01-18

## 2023-01-18 ENCOUNTER — OFFICE VISIT (OUTPATIENT)
Dept: ENT CLINIC | Age: 68
End: 2023-01-18
Payer: COMMERCIAL

## 2023-01-18 VITALS
HEIGHT: 78 IN | SYSTOLIC BLOOD PRESSURE: 105 MMHG | BODY MASS INDEX: 33.78 KG/M2 | HEART RATE: 80 BPM | WEIGHT: 292 LBS | DIASTOLIC BLOOD PRESSURE: 62 MMHG

## 2023-01-18 DIAGNOSIS — J34.3 HYPERTROPHY OF BOTH INFERIOR NASAL TURBINATES: ICD-10-CM

## 2023-01-18 DIAGNOSIS — J30.0 VASOMOTOR RHINITIS: Primary | ICD-10-CM

## 2023-01-18 DIAGNOSIS — J34.2 DNS (DEVIATED NASAL SEPTUM): ICD-10-CM

## 2023-01-18 DIAGNOSIS — D35.2 PITUITARY ADENOMA (HCC): ICD-10-CM

## 2023-01-18 DIAGNOSIS — J32.3 CHRONIC SPHENOIDAL SINUSITIS: ICD-10-CM

## 2023-01-18 PROCEDURE — 99213 OFFICE O/P EST LOW 20 MIN: CPT | Performed by: OTOLARYNGOLOGY

## 2023-01-18 PROCEDURE — 1123F ACP DISCUSS/DSCN MKR DOCD: CPT | Performed by: OTOLARYNGOLOGY

## 2023-01-18 PROCEDURE — G8428 CUR MEDS NOT DOCUMENT: HCPCS | Performed by: OTOLARYNGOLOGY

## 2023-01-18 PROCEDURE — 1036F TOBACCO NON-USER: CPT | Performed by: OTOLARYNGOLOGY

## 2023-01-18 PROCEDURE — G8484 FLU IMMUNIZE NO ADMIN: HCPCS | Performed by: OTOLARYNGOLOGY

## 2023-01-18 PROCEDURE — 3017F COLORECTAL CA SCREEN DOC REV: CPT | Performed by: OTOLARYNGOLOGY

## 2023-01-18 PROCEDURE — G8417 CALC BMI ABV UP PARAM F/U: HCPCS | Performed by: OTOLARYNGOLOGY

## 2023-01-18 RX ORDER — TRIAMCINOLONE ACETONIDE 0.25 MG/G
CREAM TOPICAL
Qty: 22 G | Refills: 1 | Status: SHIPPED | OUTPATIENT
Start: 2023-01-18

## 2023-01-18 RX ORDER — CETIRIZINE HYDROCHLORIDE 10 MG/1
10 TABLET ORAL DAILY
COMMUNITY

## 2023-01-18 ASSESSMENT — ENCOUNTER SYMPTOMS
VOMITING: 0
COUGH: 0
SORE THROAT: 0
VOICE CHANGE: 0
TROUBLE SWALLOWING: 0
RHINORRHEA: 1
SHORTNESS OF BREATH: 0

## 2023-01-18 NOTE — TELEPHONE ENCOUNTER
Pt called and stated that he called earlier and gave the wrong info . He told Dr Valentina Monson when he was here earlier today that he was taking sudafed .  He is not taking sudafed but zyrtec is what he is taking

## 2023-01-18 NOTE — PROGRESS NOTES
[id number] [facility] Otolaryngology  Dr. Dominic Monatño. Petr Brown Ms.Ed        Patient Name:  Polina Ackerman  :  1955     CHIEF C/O:    Chief Complaint   Patient presents with    Follow-up     6 month allergy and sinus, pt states he has been taking sudafed, daily, which seems to be working better than other allergy medications. HISTORY OBTAINED FROM:  patient    HISTORY OF PRESENT ILLNESS:       Wenceslao Boyd is a 69y.o. year old male, here today for follow up of histoy of seasonal allergies. Patient's been utilizing multiple medical therapies, recently started Zyrtec, which she states is using daily as improving his overall symptoms. Denies any current nasal congestion fever chills difficulty swallowing headaches vision changes nausea vomiting chest pain.     Past Medical History:   Diagnosis Date    Cervical (neck) region somatic dysfunction 2022    Cervicogenic headache 2022    Chronic kidney disease     COPD (chronic obstructive pulmonary disease) (HCC)     DVT of lower extremity (deep venous thrombosis) ([facility] [address] 2007    post foot surgery      GERD (gastroesophageal reflux disease)     HIV (human immunodeficiency virus infection) ([facility] [address]     Hyperlipidemia     Hypertension     Muscle contraction headache 2022    Prostate enlargement      Past Surgical History:   Procedure Laterality Date    ABDOMEN SURGERY  1970    colon resection d/t paralytic ileus from blunt trauma to abdomen     ANKLE SURGERY Right     APPENDECTOMY      COLONOSCOPY      FOOT SURGERY Bilateral     hammertoe    HAMMER TOE SURGERY Left 3/22/2019    CONDYLECTOMY 4TH TOE HAMMERTOE CORRECTION  5TH TOE LEFT FOOT. V TO Y SKIN PLASTY 5TH METATARSAL PHALANGEAL JOINT LEFT FOOT performed by Joel Moore DPM at  [facility] Left     axilla & Rt side neck    OTHER SURGICAL HISTORY Left 2019    hammertoe correction 4th and 5th toes left foot, skin plasty 5th MPJ left foot Current Outpatient Medications:     Pseudoephedrine HCl (SUDAFED 24 HOUR PO), Take by mouth daily, Disp: , Rfl:     acetaminophen (TYLENOL) 325 MG tablet, 650 mg, Disp: , Rfl:     loratadine (CLARITIN) 10 MG tablet, 10 mg, Disp: , Rfl:     carboxymethylcellulose (REFRESH PLUS) 0.5 % SOLN ophthalmic solution, Apply to eye, Disp: , Rfl:     hydrocortisone 2.5 % cream, APPLY TO AFFECTED AREA TWICE DAILY AS NEEDED FOR FLARES, Disp: , Rfl:     ketoconazole (NIZORAL) 2 % cream, APPLY TWICE DAILY FOR 3 WEEKS WITH FLARES, Disp: , Rfl:     terbinafine (LAMISIL) 1 % cream, Apply topically, Disp: , Rfl:     Testosterone (ANDROGEL) 20.25 MG/ACT (1.62%) GEL gel, Apply topically. , Disp: , Rfl:     topiramate (TOPAMAX) 25 MG tablet, START 1 TABLET BY MOUTH EVERY NIGHT AT BEDTIME FOR 1 WEEK, THEN 1 TWICE DAILY FOR 1 WEEK, THEN 2 TWICE DAILY DAILY IF NEEDED, Disp: 372 tablet, Rfl: 1    apixaban (ELIQUIS) 5 MG TABS tablet, , Disp: , Rfl:     montelukast (SINGULAIR) 10 MG tablet, Take 1 tablet by mouth daily, Disp: 30 tablet, Rfl: 3    Loratadine-Pseudoephedrine (CLARITIN-D 12 HOUR PO), Take by mouth, Disp: , Rfl:     Aromatic Inhalants (VICKS VAPOR IN), Inhale into the lungs In a machine to help him breath, Disp: , Rfl:     Hypertonic Nasal Wash (SINUS RINSE) PACK, 1 kit by Nasal route daily, Disp: 1 kit, Rfl: 1    azelastine (ASTELIN) 0.1 % nasal spray, 2 sprays by Nasal route 2 times daily Use in each nostril as directed, Disp: 3 Bottle, Rfl: 2    hydroCHLOROthiazide (HYDRODIURIL) 25 MG tablet, Take 25 mg by mouth daily, Disp: , Rfl:     lisinopril (PRINIVIL;ZESTRIL) 20 MG tablet, Take 20 mg by mouth daily, Disp: , Rfl:     sodium chloride (OCEAN, BABY AYR) 0.65 % nasal spray, 2 sprays by Nasal route as needed for Congestion (5 times daily), Disp: , Rfl:     fluticasone (FLONASE) 50 MCG/ACT nasal spray, 2 sprays by Each Nostril route daily, Disp: , Rfl:     tiotropium (SPIRIVA RESPIMAT) 1.25 MCG/ACT AERS inhaler, Inhale 2 puffs into the lungs daily, Disp: , Rfl:     omeprazole (PRILOSEC) 20 MG delayed release capsule, Take 20 mg by mouth daily, Disp: , Rfl:     Abacavir-Dolutegravir-Lamivud 600- MG TABS, Take 1 tablet by mouth nightly, Disp: , Rfl:     rosuvastatin (CRESTOR) 40 MG tablet, Take 40 mg by mouth every evening, Disp: , Rfl:   Cat hair extract, Gluten, Other, Seasonal, and Ultram [tramadol hcl]  Social History     Tobacco Use    Smoking status: Former     Packs/day: 1.00     Years: 40.00     Pack years: 40.00     Types: Cigarettes     Quit date: 2019     Years since quittin.0    Smokeless tobacco: Never   Substance Use Topics    Alcohol use: No    Drug use: No     Family History   Problem Relation Age of Onset    Breast Cancer Mother     Colon Cancer Mother     Heart Disease Father     Stroke Father     Diabetes Father     Lung Cancer Brother        Review of Systems   Constitutional:  Negative for chills and fever. HENT:  Positive for congestion, postnasal drip and rhinorrhea. Negative for ear discharge, hearing loss, sore throat, trouble swallowing and voice change. Respiratory:  Negative for cough and shortness of breath. Cardiovascular:  Negative for chest pain and palpitations. Gastrointestinal:  Negative for vomiting. Skin:  Negative for rash. Allergic/Immunologic: Negative for environmental allergies. Neurological:  Negative for dizziness and headaches. Hematological:  Does not bruise/bleed easily. All other systems reviewed and are negative. /62 (Site: Right Upper Arm, Position: Sitting, Cuff Size: Large Adult)   Pulse 80   Ht 6' 7\" (2.007 m)   Wt 292 lb (132.5 kg)   BMI 32.90 kg/m²   Physical Exam  Vitals and nursing note reviewed. Constitutional:       Appearance: He is well-developed. HENT:      Head: Normocephalic and atraumatic. No contusion, masses or laceration. Right Ear: Tympanic membrane and ear canal normal. There is no impacted cerumen.       Left Ear: Tympanic membrane and ear canal normal. There is no impacted cerumen. Nose: Septal deviation, congestion and rhinorrhea (dns to right) present. Rhinorrhea is clear. Right Nostril: No epistaxis. Left Nostril: No epistaxis. Right Turbinates: Not swollen. Left Turbinates: Not swollen. Comments: Clear PND      Mouth/Throat:      Mouth: Mucous membranes are moist. No oral lesions. Dentition: No gum lesions. Pharynx: No oropharyngeal exudate or posterior oropharyngeal erythema. Eyes:      Pupils: Pupils are equal, round, and reactive to light. Neck:      Thyroid: No thyromegaly. Trachea: No tracheal deviation. Cardiovascular:      Rate and Rhythm: Normal rate. Pulmonary:      Effort: Pulmonary effort is normal. No respiratory distress. Musculoskeletal:         General: Normal range of motion. Cervical back: Normal range of motion. Lymphadenopathy:      Cervical: No cervical adenopathy. Skin:     General: Skin is warm. Findings: No erythema. Neurological:      Mental Status: He is alert. Cranial Nerves: No cranial nerve deficit. IMPRESSION/PLAN:  Sudafed intermittently   Continue nasal sp  Kenalog cream to ears EOD prn for itching   Follow up 6 mo        Dr. Lizette Hernandez.  Otolaryngology Facial Plastic Surgery  :  [facility] Otolaryngology/Facial Plastic Surgery Residency  Associate Clinical Professor:  Farzaneh Quijano [facility]

## 2023-01-18 NOTE — PATIENT INSTRUCTIONS
Tone back on sudafed. Can try every other day or every couple of days or can try for about 5 days and then take a break.     Apply cream to ears once every other day as needed for itching

## 2023-02-24 ENCOUNTER — HOSPITAL ENCOUNTER (EMERGENCY)
Age: 68
Discharge: HOME | End: 2023-02-24
Payer: COMMERCIAL

## 2023-02-24 ENCOUNTER — APPOINTMENT (OUTPATIENT)
Dept: GENERAL RADIOLOGY | Age: 68
End: 2023-02-24
Payer: COMMERCIAL

## 2023-02-24 VITALS
WEIGHT: 291 LBS | BODY MASS INDEX: 33.67 KG/M2 | DIASTOLIC BLOOD PRESSURE: 87 MMHG | HEIGHT: 78 IN | HEART RATE: 85 BPM | SYSTOLIC BLOOD PRESSURE: 144 MMHG | TEMPERATURE: 98.5 F | OXYGEN SATURATION: 95 % | RESPIRATION RATE: 18 BRPM

## 2023-02-24 DIAGNOSIS — M79.674 PAIN OF TOE OF RIGHT FOOT: Primary | ICD-10-CM

## 2023-02-24 PROCEDURE — 73630 X-RAY EXAM OF FOOT: CPT

## 2023-02-24 PROCEDURE — 99211 OFF/OP EST MAY X REQ PHY/QHP: CPT

## 2023-02-24 RX ORDER — PREDNISONE 20 MG/1
60 TABLET ORAL DAILY
Qty: 15 TABLET | Refills: 0 | Status: SHIPPED | OUTPATIENT
Start: 2023-02-24 | End: 2023-03-01

## 2023-02-24 RX ORDER — TAMSULOSIN HYDROCHLORIDE 0.4 MG/1
0.4 CAPSULE ORAL DAILY
COMMUNITY

## 2023-02-24 RX ORDER — FINASTERIDE 5 MG/1
5 TABLET, FILM COATED ORAL DAILY
COMMUNITY

## 2023-02-24 ASSESSMENT — PAIN SCALES - GENERAL: PAINLEVEL_OUTOF10: 8

## 2023-02-24 ASSESSMENT — PAIN - FUNCTIONAL ASSESSMENT: PAIN_FUNCTIONAL_ASSESSMENT: 0-10

## 2023-02-24 ASSESSMENT — PAIN DESCRIPTION - LOCATION: LOCATION: TOE (COMMENT WHICH ONE)

## 2023-02-24 NOTE — ED PROVIDER NOTES
HPI:  2/24/23, Time: 3:55 PM RASHEEDA Brower is a 69 y.o. male presenting to the ED for pain to the toes of the right foot, beginning around noon today. The complaint has been persistent, moderate in severity, and worsened by movement and palpation of 1-4th toes of right foot. Patient does not recall any particular injury prior to the onset of symptoms. Denies any prior injuries to this area. Denies any numbness, tingling, temperature changes to the right toes, foot or ankle. Afebrile without recent travel or sick contacts. Patient denies all other symptoms at this time. Review of Systems:   A complete review of systems was performed and pertinent positives and negatives are stated within HPI, all other systems reviewed and are negative.          --------------------------------------------- PAST HISTORY ---------------------------------------------  Past Medical History:  has a past medical history of Cervical (neck) region somatic dysfunction, Cervicogenic headache, Chronic kidney disease, COPD (chronic obstructive pulmonary disease) ([facility] [address] [facility] GERD (gastroesophageal reflux disease), HIV (human immunodeficiency virus infection) [facility] Hyperlipidemia, Hypertension, Muscle contraction headache, and Prostate enlargement. Past Surgical History:  has a past surgical history that includes Abdomen surgery (1970); lymphadenectomy (Left); Colonoscopy; Foot surgery (Bilateral); Ankle surgery (Right); hernia repair; Heel spur surgery; other surgical history (Left, 03/22/2019); Hammer toe surgery (Left, 3/22/2019); and Appendectomy. Social History:  reports that he quit smoking about 4 years ago. His smoking use included cigarettes. He has a 40.00 pack-year smoking history. He has never used smokeless tobacco. He reports that he does not drink alcohol and does not use drugs.     Family History: family history includes Breast Cancer in his mother; Colon Cancer in his mother; Diabetes in his father; Heart Disease in his father; Dionte Loges in his brother; Stroke in his father. The patients home medications have been reviewed. Allergies: Cat hair extract, Gluten, Other, Seasonal, and Ultram [tramadol hcl]    -------------------------------------------------- RESULTS -------------------------------------------------  All laboratory and radiology results have been personally reviewed by myself   LABS:  No results found for this visit on 02/24/23. RADIOLOGY:  Interpreted by Radiologist.  XR FOOT RIGHT (MIN 3 VIEWS)   Final Result   1. Decreased osseous mineralization. No acute osseous findings seen about   the right foot. There does appear to be mild dorsal soft tissue swelling of   the mid to forefoot. 2.  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 Calcaneal enthesophytes more   pronounced anteriorly. RECOMMENDATION:   In the setting of recent trauma, if there is persistent symptoms and physical   exam warrants a repeat radiograph in 10-14 days could be considered as occult   fractures may not be evident on initial imaging evaluation.             ------------------------- NURSING NOTES AND VITALS REVIEWED ---------------------------   The nursing notes within the ED encounter and vital signs as below have been reviewed.    BP (!) 144/87   Pulse 85   Temp 98.5 °F (36.9 °C)   Resp 18   Ht 6' 6\" (1.981 m)   Wt 291 lb (132 kg)   SpO2 95%   BMI 33.63 kg/m²   Oxygen Saturation Interpretation: Normal      ---------------------------------------------------PHYSICAL EXAM--------------------------------------      Constitutional/General: Alert and oriented x3, well appearing, non toxic in NAD  Head: Normocephalic and atraumatic  Eyes: PERRL, EOMI  Mouth: Oropharynx clear, handling secretions, no trismus  Neck: Supple, full ROM,    Extremities: Moves all extremities x 4. Warm and well perfused. 1-4th toes of the right  to palpate. 2+ dorsalis pedis and posterior tibial pulses easily palpated on exam.  No swelling to the dorsum of the foot. Full range of motion of the toes of the right foot as well as the right ankle. No pretibial edema on the right however patient is wearing compression socks. Skin: warm and dry without rash. Chronically discolored toes of the right foot per patient's report. Capillary refill is less than 3 seconds to the toes of the right foot. Neurologic: GCS 15,  Psych: Normal Affect      ------------------------------ ED COURSE/MEDICAL DECISION MAKING----------------------  Medications - No data to display      ED COURSE:       Medical Decision Making:    Patient is a 49-year-old male presenting to urgent care today with pain to the toes of the right foot. X-ray does not reveal any acute fracture. There are some chronic degenerative changes noted however. Patient does have very strong dorsalis pedis and posterior tibial pulses of the right foot. Does have chronic discoloration noted however is unchanged per his report. Does have tenderness to palpation. At this time we will plan on outpatient symptom management with the steroid pack. Advised to follow very closely with PCP for recheck and return the emergency department with any new or worsening symptoms. Patient voiced understanding is agreeable to the above treatment plan. Counseling: The emergency provider has spoken with the patient and discussed todays results, in addition to providing specific details for the plan of care and counseling regarding the diagnosis and prognosis. Questions are answered at this time and they are agreeable with the plan.      --------------------------------- IMPRESSION AND DISPOSITION ---------------------------------    IMPRESSION  1.  Pain of toe of right foot        DISPOSITION  Disposition: Discharge to home  Patient condition is stable      NOTE: This report was transcribed using voice recognition software.  Every effort was made to ensure accuracy; however, inadvertent computerized transcription errors may be present        Lynne Christal Carpenter  02/24/23 [id number]

## 2023-03-07 LAB
ALBUMIN SERPL-MCNC: NORMAL G/DL
ALP BLD-CCNC: NORMAL U/L
ALT SERPL-CCNC: NORMAL U/L
ANION GAP SERPL CALCULATED.3IONS-SCNC: NORMAL MMOL/L
AST SERPL-CCNC: NORMAL U/L
BILIRUB SERPL-MCNC: NORMAL MG/DL
BUN BLDV-MCNC: NORMAL MG/DL
CALCIUM SERPL-MCNC: NORMAL MG/DL
CHLORIDE BLD-SCNC: NORMAL MMOL/L
CO2: NORMAL
CREAT SERPL-MCNC: NORMAL MG/DL
EGFR: NORMAL
GLUCOSE BLD-MCNC: NORMAL MG/DL
POTASSIUM SERPL-SCNC: NORMAL MMOL/L
SODIUM BLD-SCNC: NORMAL MMOL/L
TOTAL PROTEIN: NORMAL

## 2023-03-08 ENCOUNTER — OFFICE VISIT (OUTPATIENT)
Dept: PRIMARY CARE CLINIC | Age: 68
End: 2023-03-08

## 2023-03-08 VITALS
WEIGHT: 301.1 LBS | SYSTOLIC BLOOD PRESSURE: 120 MMHG | BODY MASS INDEX: 34.84 KG/M2 | DIASTOLIC BLOOD PRESSURE: 78 MMHG | HEIGHT: 78 IN | TEMPERATURE: 97.2 F | OXYGEN SATURATION: 94 % | HEART RATE: 88 BPM

## 2023-03-08 DIAGNOSIS — Z86.718 HISTORY OF RECURRENT DEEP VEIN THROMBOSIS (DVT): ICD-10-CM

## 2023-03-08 DIAGNOSIS — Z91.81 AT HIGH RISK FOR FALLS: ICD-10-CM

## 2023-03-08 DIAGNOSIS — R26.81 UNSTEADY GAIT WHEN WALKING: ICD-10-CM

## 2023-03-08 DIAGNOSIS — E78.2 MIXED HYPERLIPIDEMIA: ICD-10-CM

## 2023-03-08 DIAGNOSIS — Z79.01 CHRONIC ANTICOAGULATION: ICD-10-CM

## 2023-03-08 DIAGNOSIS — Z86.73 HISTORY OF CVA (CEREBROVASCULAR ACCIDENT): ICD-10-CM

## 2023-03-08 DIAGNOSIS — I82.5Y9 CHRONIC DEEP VEIN THROMBOSIS (DVT) OF PROXIMAL VEIN OF LOWER EXTREMITY, UNSPECIFIED LATERALITY (HCC): Primary | ICD-10-CM

## 2023-03-08 DIAGNOSIS — Z87.891 HISTORY OF TOBACCO ABUSE: ICD-10-CM

## 2023-03-08 DIAGNOSIS — B20 CURRENTLY ASYMPTOMATIC HIV INFECTION, WITH HISTORY OF HIV-RELATED ILLNESS (HCC): ICD-10-CM

## 2023-03-08 DIAGNOSIS — I10 PRIMARY HYPERTENSION: ICD-10-CM

## 2023-03-08 DIAGNOSIS — N40.0 BENIGN PROSTATIC HYPERPLASIA WITHOUT LOWER URINARY TRACT SYMPTOMS: ICD-10-CM

## 2023-03-08 DIAGNOSIS — Z87.891 PERSONAL HISTORY OF TOBACCO USE: ICD-10-CM

## 2023-03-08 DIAGNOSIS — N18.31 STAGE 3A CHRONIC KIDNEY DISEASE (HCC): ICD-10-CM

## 2023-03-08 DIAGNOSIS — E23.6 PITUITARY MASS (HCC): ICD-10-CM

## 2023-03-08 DIAGNOSIS — J44.9 COPD WITHOUT EXACERBATION (HCC): Chronic | ICD-10-CM

## 2023-03-08 RX ORDER — LISINOPRIL AND HYDROCHLOROTHIAZIDE 20; 12.5 MG/1; MG/1
2 TABLET ORAL DAILY
COMMUNITY

## 2023-03-08 SDOH — ECONOMIC STABILITY: FOOD INSECURITY: WITHIN THE PAST 12 MONTHS, THE FOOD YOU BOUGHT JUST DIDN'T LAST AND YOU DIDN'T HAVE MONEY TO GET MORE.: NEVER TRUE

## 2023-03-08 SDOH — ECONOMIC STABILITY: FOOD INSECURITY: WITHIN THE PAST 12 MONTHS, YOU WORRIED THAT YOUR FOOD WOULD RUN OUT BEFORE YOU GOT MONEY TO BUY MORE.: NEVER TRUE

## 2023-03-08 SDOH — ECONOMIC STABILITY: INCOME INSECURITY: HOW HARD IS IT FOR YOU TO PAY FOR THE VERY BASICS LIKE FOOD, HOUSING, MEDICAL CARE, AND HEATING?: NOT HARD AT ALL

## 2023-03-08 SDOH — ECONOMIC STABILITY: HOUSING INSECURITY
IN THE LAST 12 MONTHS, WAS THERE A TIME WHEN YOU DID NOT HAVE A STEADY PLACE TO SLEEP OR SLEPT IN A SHELTER (INCLUDING NOW)?: NO

## 2023-03-08 ASSESSMENT — PATIENT HEALTH QUESTIONNAIRE - PHQ9
SUM OF ALL RESPONSES TO PHQ QUESTIONS 1-9: 0
SUM OF ALL RESPONSES TO PHQ QUESTIONS 1-9: 0
SUM OF ALL RESPONSES TO PHQ9 QUESTIONS 1 & 2: 0
2. FEELING DOWN, DEPRESSED OR HOPELESS: 0
1. LITTLE INTEREST OR PLEASURE IN DOING THINGS: 0
SUM OF ALL RESPONSES TO PHQ QUESTIONS 1-9: 0
SUM OF ALL RESPONSES TO PHQ QUESTIONS 1-9: 0

## 2023-03-08 NOTE — PROGRESS NOTES
HPI:  The patient is a 69 y.o. male who presents today to establish care. This is a patient of Dr. Romina Valdivia is new to the practice  This is a 58-year-old white male with past medical history significant for recurrent DVT of the right lower extremity with chronic anticoagulation, COPD, history of CVA affecting left lower extremity, hypertension hyperlipidemia chronic kidney disease HIV BPH and recent diagnosis of pituitary tumor status post partial resection. Patient lives in [place], single, retired.,  Homosexual  He has a significant history of tobacco abuse about 2 packs a day for 45 years he just quit in 2018. He also had a significant history of alcohol abuse and he quit back in 1990. Past surgical history of partial intestinal resection, hammertoe dropfoot on the right side, lymph node resection on the left axilla that is when he was diagnosed with HIV. He also has a history of ruptured appendix remotely    The patient's also go to the [place] clinic and had blood work that was just done recently according to him about a month ago. Blood work from 9 months ago was reviewed and discussed with patient appears within stable reasonable range with mild chronic kidney disease. He is also seeing urology for BPH and he is on schedule for surgery according to him    The patient complains of weakness of the lower extremities with unsteady gait and he is actually using a walker for stability. He has a leg brace on the right lower extremity for dropfoot. He would like to get some physical therapy or strengthening exercise. He is high risk for fall.   .    Past Medical History:   Diagnosis Date    Cervical (neck) region somatic dysfunction 5/9/2022    Cervicogenic headache 5/9/2022    Chronic kidney disease     COPD (chronic obstructive pulmonary disease) (HCC)     DVT of lower extremity (deep venous thrombosis) ([facility] [address] 2007    post foot surgery      GERD (gastroesophageal reflux disease)     HIV (human immunodeficiency virus infection) ([facility] [address]     Hyperlipidemia     Hypertension     Muscle contraction headache 2022    Prostate enlargement       Past Surgical History:   Procedure Laterality Date    ABDOMEN SURGERY  1970    colon resection d/t paralytic ileus from blunt trauma to abdomen     ANKLE SURGERY Right     APPENDECTOMY      COLONOSCOPY      FOOT SURGERY Bilateral     hammertoe    HAMMER TOE SURGERY Left 3/22/2019    CONDYLECTOMY 4TH TOE HAMMERTOE CORRECTION  5TH TOE LEFT FOOT. V TO Y SKIN PLASTY 5TH METATARSAL PHALANGEAL JOINT LEFT FOOT performed by Maggie Woods DPM at [address] [place] Left     axilla & Rt side neck    OTHER SURGICAL HISTORY Left 2019    hammertoe correction 4th and 5th toes left foot, skin plasty 5th MPJ left foot      Family History   Problem Relation Age of Onset    Breast Cancer Mother     Colon Cancer Mother     Heart Disease Father     Stroke Father     Diabetes Father     Lung Cancer Brother      Social History     Socioeconomic History    Marital status: Single     Spouse name: Not on file    Number of children: Not on file    Years of education: Not on file    Highest education level: Not on file   Occupational History    Not on file   Tobacco Use    Smoking status: Former     Packs/day: 1.00     Years: 40.00     Pack years: 40.00     Types: Cigarettes     Quit date: 2019     Years since quittin.1    Smokeless tobacco: Never   Vaping Use    Vaping Use: Never used   Substance and Sexual Activity    Alcohol use: No    Drug use: No    Sexual activity: Never   Other Topics Concern    Not on file   Social History Narrative    Not on file     Social Determinants of Health     Financial Resource Strain: Low Risk     Difficulty of Paying Living Expenses: Not hard at all   Food Insecurity: No Food Insecurity    Worried About Running Out of Food in the Last Year: Never true    Ran Out of Food in the Last Year: Never true Transportation Needs: Unknown    Lack of Transportation (Medical): Not on file    Lack of Transportation (Non-Medical): No   Physical Activity: Not on file   Stress: Not on file   Social Connections: Not on file   Intimate Partner Violence: Not on file   Housing Stability: Unknown    Unable to Pay for Housing in the Last Year: Not on file    Number of Places Lived in the Last Year: Not on file    Unstable Housing in the Last Year: No      Allergies   Allergen Reactions    Cat Hair Extract     Other      Inside birds    Seasonal     Ultram [Tramadol Hcl] Itching        Review of Systems:  Constitutional:  No fever, no fatigue, no chills, no headaches, no weight change  Dermatology:  No rash, no mole, no dry or sensitive skin  ENT:  No cough, no sore throat, no sinus pain, no runny nose, no ear pain  Cardiology:  No chest pain, no palpitations, no leg edema, no shortness of breath, no PND  Endocrinology:  No polydipsia, no polyuria, no cold intolerance, no heat intolerance, no polyphagia, no hair changes  Gastroenterology:  No dysphagia, no abdominal pain, no nausea, no vomiting, no constipation, no diarrhea, no heartburn  Male Reproductive:  No penile discharge, no dysuria  Musculoskeletal:  No joint pain, no leg cramps, no back pain, no muscle aches  Respiratory:  No shortness of breath, no orthopnea, no wheezing, no [place], no hemoptysis  Urology:  No blood in the urine, no urinary frequency, no urinary incontinence, no urinary urgency, no nocturia, no dysuria  Neurology:  No numbness/tingling, no dizziness, no weakness  Psychology:  No depression, no sleep disturbances, no suicidal ideation, no anxiety    PHYSICAL EXAM:    Vitals:  /78   Pulse 88   Temp 97.2 °F (36.2 °C) (Temporal)   Ht 6' 7\" (2.007 m)   Wt (!) 301 lb 1.6 oz (136.6 kg)   SpO2 94%   BMI 33.92 kg/m²     General:  Awake, alert, oriented X 3. Well developed, well nourished, . No apparent distress.   Unsteady gait using a walker, mild speech abnormality  HEENT:  Normocephalic, atraumatic. Pupils equal, round, reactive to light. No scleral icterus. No conjunctival injection. Normal lips,  and gums. No nasal discharge. Neck:  Supple  Heart:  RRR, no murmurs, gallops, or rubs  Lungs:  CTA bilaterally, bilat symmetrical expansion, no wheeze, rales, or rhonchi  Abdomen: Bowel sounds present, soft, nontender, no masses, questionable hepatal megaly, no peritoneal signs  Extremities:  No clubbing, cyanosis, or edema, brace on the right lower extremity  Skin:  Warm and dry, no open lesions or rash  Neuro:  Cranial nerves 2-12 intact, no focal deficits    Assessment & Plan:  1. Chronic deep vein thrombosis (DVT) of proximal vein of lower extremity, unspecified laterality ([facility]  2. COPD without exacerbation ([facility] [facility]  3. Currently asymptomatic HIV infection, with history of HIV-related illness ([facility] [address]  4. Mixed hyperlipidemia  -     Lipid Panel; Future  5. Primary hypertension  -     Comprehensive Metabolic Panel; Future  6. Stage 3a chronic kidney disease ([facility] [address]  7. Pituitary mass ([facility] [address]  8. Benign prostatic hyperplasia without lower urinary tract symptoms  9. Personal history of tobacco use  -     ID VISIT TO DISCUSS LUNG CA SCREEN W LDCT  -     CT Lung Screen (Annual/Baseline); Future  10. History of tobacco abuse  -     ID VISIT TO DISCUSS LUNG CA SCREEN W LDCT  -     CT Lung Screen (Annual/Baseline); Future  11. Unsteady gait when walking  -     Vitamin B12; Future  -     Vitamin D 25 Hydroxy; Future  -     External Referral To Physical Therapy  12. History of CVA (cerebrovascular accident)  15. History of recurrent deep vein thrombosis (DVT)  14. Chronic anticoagulation  15. At high risk for falls     Took about 60 minutes for face-to-face interaction with the patient going over his past medical history and prescribing new investigations and blood work.   Start physical therapy for gait strengthening and balance     Discussed with the patient the current USPSTF guidelines released March 9, 2021 for screening for lung cancer. For adults aged 48 to [de-identified] years who have a 20 pack-year smoking history and currently smoke or have quit within the past 15 years the grade B recommendation is to:  Screen for lung cancer with low-dose computed tomography (LDCT) every year. Stop screening once a person has not smoked for 15 years or has a health problem that limits life expectancy or the ability to have lung surgery. The patient  reports that he quit smoking about 4 years ago. His smoking use included cigarettes. He has a 40.00 pack-year smoking history. He has never used smokeless tobacco.. Discussed with patient the risks and benefits of screening, including over-diagnosis, false positive rate, and total radiation exposure. The patient currently exhibits no signs or symptoms suggestive of lung cancer. Discussed with patient the importance of compliance with yearly annual lung cancer screenings and willingness to undergo diagnosis and treatment if screening scan is positive. In addition, the patient was counseled regarding the importance of remaining smoke free and/or total smoking cessation.     Also reviewed the following if the patient has Medicare that as of February 10, 2022, Medicare only covers LDCT screening in patients aged 51-72 with at least a 20 pack-year smoking history who currently smoke or have quit in the last 15 years

## 2023-03-08 NOTE — PATIENT INSTRUCTIONS

## 2023-03-09 DIAGNOSIS — R26.81 UNSTEADY GAIT WHEN WALKING: ICD-10-CM

## 2023-03-09 DIAGNOSIS — I10 PRIMARY HYPERTENSION: ICD-10-CM

## 2023-03-09 DIAGNOSIS — E78.2 MIXED HYPERLIPIDEMIA: ICD-10-CM

## 2023-03-09 LAB
ALBUMIN SERPL-MCNC: 3.9 G/DL (ref 3.5–5.2)
ALP BLD-CCNC: 58 U/L (ref 40–129)
ALT SERPL-CCNC: 24 U/L (ref 0–40)
ANION GAP SERPL CALCULATED.3IONS-SCNC: 10 MMOL/L (ref 7–16)
AST SERPL-CCNC: 20 U/L (ref 0–39)
BILIRUB SERPL-MCNC: 1 MG/DL (ref 0–1.2)
BUN BLDV-MCNC: 22 MG/DL (ref 6–23)
CALCIUM SERPL-MCNC: 9.1 MG/DL (ref 8.6–10.2)
CHLORIDE BLD-SCNC: 104 MMOL/L (ref 98–107)
CHOLESTEROL, TOTAL: 191 MG/DL (ref 0–199)
CO2: 24 MMOL/L (ref 22–29)
CREAT SERPL-MCNC: 1.2 MG/DL (ref 0.7–1.2)
GFR SERPL CREATININE-BSD FRML MDRD: >60 ML/MIN/1.73
GLUCOSE BLD-MCNC: 107 MG/DL (ref 74–99)
HDLC SERPL-MCNC: 47 MG/DL
LDL CHOLESTEROL CALCULATED: 122 MG/DL (ref 0–99)
POTASSIUM SERPL-SCNC: 4.3 MMOL/L (ref 3.5–5)
SODIUM BLD-SCNC: 138 MMOL/L (ref 132–146)
TOTAL PROTEIN: 6.9 G/DL (ref 6.4–8.3)
TRIGL SERPL-MCNC: 110 MG/DL (ref 0–149)
VITAMIN D 25-HYDROXY: 28 NG/ML (ref 30–100)
VLDLC SERPL CALC-MCNC: 22 MG/DL

## 2023-03-10 LAB — VITAMIN B-12: 551 PG/ML (ref 211–946)

## 2023-03-22 ENCOUNTER — TELEPHONE (OUTPATIENT)
Dept: CASE MANAGEMENT | Age: 68
End: 2023-03-22

## 2023-03-22 NOTE — TELEPHONE ENCOUNTER
I called the patient's phone but I was unable to get through due to the phone number being incorrect. So I was unable to leave a message reminding him of his CT lung screening at [facility] on 3/23/2023 at 9:00 am with an 8:30 am arrival.  If unable to keep this appt call the office at [phone] to get rescheduled.           Electronically signed by Cheyenne Hicks on 3/22/23 at 12:50 PM EDT

## 2023-03-28 ENCOUNTER — TELEPHONE (OUTPATIENT)
Dept: CASE MANAGEMENT | Age: 68
End: 2023-03-28

## 2023-03-28 NOTE — TELEPHONE ENCOUNTER
No call, encounter opened to process CT Lung Screening. CT Lung Screen: 3/23/2023    Impression   1. There is no pulmonary infiltrate, mass or suspicious pulmonary nodule. LUNG RADS:   Lung-RADS 1 - Negative ()       Management:  12 month screening LDCT       RECOMMENDATIONS:   If you would like to register your patient with the [facility], please contact the Nurse Navigator at   [phone]. Pack years: 36    Social History     Tobacco Use  Smoking Status: Former Smoker    Start Date:    Quit Date: 01/01/2019   Types: Cigarettes   Packs/Day: 1   Years: 36   Pack Years: 36   Smokeless Tobacco: Never         Results letter sent to patient via my chart or mailed.      [address]

## 2023-04-10 PROBLEM — M17.12 LOCALIZED OSTEOARTHRITIS OF LEFT KNEE: Status: ACTIVE | Noted: 2023-04-10

## 2023-04-19 ENCOUNTER — OFFICE VISIT (OUTPATIENT)
Dept: ORTHOPEDIC SURGERY | Age: 68
End: 2023-04-19
Payer: COMMERCIAL

## 2023-04-19 VITALS — HEIGHT: 78 IN | WEIGHT: 302 LBS | TEMPERATURE: 98 F | BODY MASS INDEX: 34.94 KG/M2

## 2023-04-19 DIAGNOSIS — S80.02XA CONTUSION OF LEFT KNEE, INITIAL ENCOUNTER: Primary | ICD-10-CM

## 2023-04-19 DIAGNOSIS — S83.207A TEAR OF MENISCUS OF LEFT KNEE, UNSPECIFIED MENISCUS, UNSPECIFIED TEAR TYPE, UNSPECIFIED WHETHER OLD OR CURRENT TEAR: ICD-10-CM

## 2023-04-19 DIAGNOSIS — M25.362 KNEE INSTABILITY, LEFT: ICD-10-CM

## 2023-04-19 PROCEDURE — 3017F COLORECTAL CA SCREEN DOC REV: CPT | Performed by: ORTHOPAEDIC SURGERY

## 2023-04-19 PROCEDURE — 1123F ACP DISCUSS/DSCN MKR DOCD: CPT | Performed by: ORTHOPAEDIC SURGERY

## 2023-04-19 PROCEDURE — 1036F TOBACCO NON-USER: CPT | Performed by: ORTHOPAEDIC SURGERY

## 2023-04-19 PROCEDURE — G8427 DOCREV CUR MEDS BY ELIG CLIN: HCPCS | Performed by: ORTHOPAEDIC SURGERY

## 2023-04-19 PROCEDURE — 99214 OFFICE O/P EST MOD 30 MIN: CPT | Performed by: ORTHOPAEDIC SURGERY

## 2023-04-19 PROCEDURE — G8417 CALC BMI ABV UP PARAM F/U: HCPCS | Performed by: ORTHOPAEDIC SURGERY

## 2023-04-19 NOTE — PROGRESS NOTES
knee.  Exam and history is consistent with possible meniscus tear. MRI recommended for further evaluation management. Follow-up after MRI    In a 15 minute assessment and discussion, patient was counseled on weight loss, healthy diet, and physical activity relating to this condition. He was educated with options in detail including nutrition, joining a health club/ weight loss program, and use of cardio equipment such as the Arc Trainer and the importance of use as well as range of motion and HEP exercises for weight loss and general health. Shayla Skelton,   4/19/23              30 minutes was spent with patient. 50% or greater was spent counseling the patient.

## 2023-05-16 LAB — PSA SERPL-MCNC: 1.38 NG/ML (ref 0–4)

## 2023-05-17 ENCOUNTER — OFFICE VISIT (OUTPATIENT)
Dept: ORTHOPEDIC SURGERY | Age: 68
End: 2023-05-17

## 2023-05-17 VITALS — BODY MASS INDEX: 34.94 KG/M2 | HEIGHT: 78 IN | TEMPERATURE: 98 F | WEIGHT: 302 LBS

## 2023-05-17 DIAGNOSIS — S80.02XA CONTUSION OF LEFT KNEE, INITIAL ENCOUNTER: ICD-10-CM

## 2023-05-17 DIAGNOSIS — M25.362 KNEE INSTABILITY, LEFT: ICD-10-CM

## 2023-05-17 DIAGNOSIS — M17.10 PATELLOFEMORAL ARTHRITIS: Primary | ICD-10-CM

## 2023-05-17 DIAGNOSIS — S83.207A TEAR OF MENISCUS OF LEFT KNEE, UNSPECIFIED MENISCUS, UNSPECIFIED TEAR TYPE, UNSPECIFIED WHETHER OLD OR CURRENT TEAR: ICD-10-CM

## 2023-05-17 DIAGNOSIS — Z71.82 EXERCISE COUNSELING: ICD-10-CM

## 2023-05-17 RX ORDER — TRIAMCINOLONE ACETONIDE 40 MG/ML
40 INJECTION, SUSPENSION INTRA-ARTICULAR; INTRAMUSCULAR ONCE
Status: COMPLETED | OUTPATIENT
Start: 2023-05-17 | End: 2023-05-17

## 2023-05-17 RX ADMIN — TRIAMCINOLONE ACETONIDE 40 MG: 40 INJECTION, SUSPENSION INTRA-ARTICULAR; INTRAMUSCULAR at 18:58

## 2023-05-17 NOTE — PROGRESS NOTES
Chief Complaint   Patient presents with    Knee Pain     Left Knee MRI F/U          HPI:    Patient is 69 y.o. male complaining of new issue of chronic, atraumatic, insidious onset left knee pain for several weeks. He admits to stiffness, deep, aching pain, swelling, difficulty with stairs and ambulating far distances. He admits to gross instability specifically in his Left knee. Previous treatments include rest, ice, and anti-inflammatory medication and HEP without much relief. Follows up after MRI. ROS:    Skin: (-) rash,(-) psoriasis,(-) eczema, (-)skin cancer. Neurologic: (-)numbness, (-)tingling, (-)headaches, (-) LOC. Cardiovascular: (-) Chest pain, (-) swelling in legs/feet, (-) SOB, (-) cramping in legs/feet with walking.     All other review of systems negative except stated above or in HPI      Past Medical History:   Diagnosis Date    Cervical (neck) region somatic dysfunction 5/9/2022    Cervicogenic headache 5/9/2022    Chronic kidney disease     COPD (chronic obstructive pulmonary disease) (HCC)     DVT of lower extremity (deep venous thrombosis) ([facility] 2007    post foot surgery      GERD (gastroesophageal reflux disease)     HIV (human immunodeficiency virus infection) ([facility] [address]     Hyperlipidemia     Hypertension     Muscle contraction headache 5/9/2022    Prostate enlargement      Past Surgical History:   Procedure Laterality Date    ABDOMEN SURGERY  1970    colon resection d/t paralytic ileus from blunt trauma to abdomen     ANKLE SURGERY Right     APPENDECTOMY      COLONOSCOPY      FOOT SURGERY Bilateral     hammertoe    HAMMER TOE SURGERY Left 3/22/2019    CONDYLECTOMY 4TH TOE HAMMERTOE CORRECTION  5TH TOE LEFT FOOT. V TO Y SKIN PLASTY 5TH METATARSAL PHALANGEAL JOINT LEFT FOOT performed by Deysi Medina DPM at [address] Left     axilla & Rt side neck    OTHER SURGICAL HISTORY Left 03/22/2019    hammertoe correction 4th

## 2023-06-14 ENCOUNTER — OFFICE VISIT (OUTPATIENT)
Dept: ORTHOPEDIC SURGERY | Age: 68
End: 2023-06-14

## 2023-06-14 VITALS — BODY MASS INDEX: 33.9 KG/M2 | WEIGHT: 293 LBS | HEIGHT: 78 IN | TEMPERATURE: 98 F

## 2023-06-14 DIAGNOSIS — S83.207A TEAR OF MENISCUS OF LEFT KNEE, UNSPECIFIED MENISCUS, UNSPECIFIED TEAR TYPE, UNSPECIFIED WHETHER OLD OR CURRENT TEAR: ICD-10-CM

## 2023-06-14 DIAGNOSIS — M25.362 KNEE INSTABILITY, LEFT: ICD-10-CM

## 2023-06-14 DIAGNOSIS — S80.02XA CONTUSION OF LEFT KNEE, INITIAL ENCOUNTER: ICD-10-CM

## 2023-06-14 DIAGNOSIS — Z71.82 EXERCISE COUNSELING: ICD-10-CM

## 2023-06-14 DIAGNOSIS — M17.10 PATELLOFEMORAL ARTHRITIS: Primary | ICD-10-CM

## 2023-07-10 ENCOUNTER — PREP FOR PROCEDURE (OUTPATIENT)
Dept: UROLOGY | Age: 68
End: 2023-07-10

## 2023-07-10 RX ORDER — SODIUM CHLORIDE 0.9 % (FLUSH) 0.9 %
5-40 SYRINGE (ML) INJECTION PRN
Status: CANCELLED | OUTPATIENT
Start: 2023-07-10

## 2023-07-10 RX ORDER — SODIUM CHLORIDE 9 MG/ML
INJECTION, SOLUTION INTRAVENOUS PRN
Status: CANCELLED | OUTPATIENT
Start: 2023-07-10

## 2023-07-10 RX ORDER — SODIUM CHLORIDE 0.9 % (FLUSH) 0.9 %
5-40 SYRINGE (ML) INJECTION EVERY 12 HOURS SCHEDULED
Status: CANCELLED | OUTPATIENT
Start: 2023-07-10

## 2023-07-10 NOTE — PROGRESS NOTES
Patient states he would like to reschedule his procedure. Patient advised to call [facility]. Patient states understanding.

## 2023-07-12 LAB
ALBUMIN SERPL-MCNC: NORMAL G/DL
ALP BLD-CCNC: NORMAL U/L
ALT SERPL-CCNC: NORMAL U/L
ANION GAP SERPL CALCULATED.3IONS-SCNC: NORMAL MMOL/L
AST SERPL-CCNC: NORMAL U/L
AVERAGE GLUCOSE: NORMAL
BASOPHILS ABSOLUTE: NORMAL
BASOPHILS RELATIVE PERCENT: NORMAL
BILIRUB SERPL-MCNC: NORMAL MG/DL
BILIRUBIN, URINE: NORMAL
BLOOD, URINE: NORMAL
BUN BLDV-MCNC: NORMAL MG/DL
CALCIUM SERPL-MCNC: NORMAL MG/DL
CHLORIDE BLD-SCNC: NORMAL MMOL/L
CLARITY: NORMAL
CO2: NORMAL
COLOR: NORMAL
CREAT SERPL-MCNC: NORMAL MG/DL
EGFR: NORMAL
EOSINOPHILS ABSOLUTE: NORMAL
EOSINOPHILS RELATIVE PERCENT: NORMAL
GLUCOSE BLD-MCNC: NORMAL MG/DL
GLUCOSE URINE: NORMAL
HBA1C MFR BLD: NORMAL %
HCT VFR BLD CALC: NORMAL %
HEMOGLOBIN: NORMAL
KETONES, URINE: NORMAL
LEUKOCYTE ESTERASE, URINE: NORMAL
LYMPHOCYTES ABSOLUTE: NORMAL
LYMPHOCYTES RELATIVE PERCENT: NORMAL
MCH RBC QN AUTO: NORMAL PG
MCHC RBC AUTO-ENTMCNC: NORMAL G/DL
MCV RBC AUTO: NORMAL FL
MONOCYTES ABSOLUTE: NORMAL
MONOCYTES RELATIVE PERCENT: NORMAL
NEUTROPHILS ABSOLUTE: NORMAL
NEUTROPHILS RELATIVE PERCENT: NORMAL
NITRITE, URINE: NORMAL
PH UA: NORMAL
PLATELET # BLD: NORMAL 10*3/UL
PMV BLD AUTO: NORMAL FL
POTASSIUM SERPL-SCNC: NORMAL MMOL/L
PROTEIN UA: NORMAL
RBC # BLD: NORMAL 10*6/UL
SODIUM BLD-SCNC: NORMAL MMOL/L
SPECIFIC GRAVITY, URINE: NORMAL
TOTAL PROTEIN: NORMAL
UROBILINOGEN, URINE: NORMAL
WBC # BLD: NORMAL 10*3/UL

## 2023-07-18 ENCOUNTER — OFFICE VISIT (OUTPATIENT)
Dept: ENT CLINIC | Age: 68
End: 2023-07-18
Payer: COMMERCIAL

## 2023-07-18 VITALS
WEIGHT: 292 LBS | HEIGHT: 78 IN | DIASTOLIC BLOOD PRESSURE: 67 MMHG | BODY MASS INDEX: 33.78 KG/M2 | HEART RATE: 74 BPM | SYSTOLIC BLOOD PRESSURE: 98 MMHG

## 2023-07-18 DIAGNOSIS — J30.9 ALLERGIC RHINITIS, UNSPECIFIED SEASONALITY, UNSPECIFIED TRIGGER: Primary | ICD-10-CM

## 2023-07-18 DIAGNOSIS — J34.3 HYPERTROPHY OF BOTH INFERIOR NASAL TURBINATES: ICD-10-CM

## 2023-07-18 DIAGNOSIS — J34.2 DNS (DEVIATED NASAL SEPTUM): ICD-10-CM

## 2023-07-18 PROCEDURE — 99213 OFFICE O/P EST LOW 20 MIN: CPT | Performed by: OTOLARYNGOLOGY

## 2023-07-18 PROCEDURE — 1123F ACP DISCUSS/DSCN MKR DOCD: CPT | Performed by: OTOLARYNGOLOGY

## 2023-07-18 PROCEDURE — G8417 CALC BMI ABV UP PARAM F/U: HCPCS | Performed by: OTOLARYNGOLOGY

## 2023-07-18 PROCEDURE — 3074F SYST BP LT 130 MM HG: CPT | Performed by: OTOLARYNGOLOGY

## 2023-07-18 PROCEDURE — 1036F TOBACCO NON-USER: CPT | Performed by: OTOLARYNGOLOGY

## 2023-07-18 PROCEDURE — 3078F DIAST BP <80 MM HG: CPT | Performed by: OTOLARYNGOLOGY

## 2023-07-18 PROCEDURE — 3017F COLORECTAL CA SCREEN DOC REV: CPT | Performed by: OTOLARYNGOLOGY

## 2023-07-18 PROCEDURE — G8427 DOCREV CUR MEDS BY ELIG CLIN: HCPCS | Performed by: OTOLARYNGOLOGY

## 2023-07-18 ASSESSMENT — ENCOUNTER SYMPTOMS
VOICE CHANGE: 0
COUGH: 0
TROUBLE SWALLOWING: 0
VOMITING: 0
RHINORRHEA: 1
SORE THROAT: 0
SHORTNESS OF BREATH: 0

## 2023-07-18 NOTE — PROGRESS NOTES
[facility] Otolaryngology  Dr. Aleks Corona. Nery Blake Ms.Ed        Patient Name:  Mgiue Edouard  :  1955     CHIEF C/O:    Chief Complaint   Patient presents with    Follow-up     Pt states allergies and sinus have been doing good. HISTORY OBTAINED FROM:  patient    HISTORY OF PRESENT ILLNESS:       Guanako Gibson is a 69y.o. year old male, here today for follow up of histoy of seasonal allergies, worst season is winter time. Patient's been using zryteec and nasal saline spray which she states is using daily as improving his overall symptoms. Denies any current nasal congestion fever chills difficulty swallowing headaches vision changes nausea vomiting chest pain. He also has pruritic EAC, was using kenalog cream prn.  Reports symptoms have resolved     Past Medical History:   Diagnosis Date    Cervical (neck) region somatic dysfunction 2022    Cervicogenic headache 2022    Chronic kidney disease     COPD (chronic obstructive pulmonary disease) ([facility])     DVT of lower extremity (deep venous thrombosis) ([address]) 2007    post foot surgery      GERD (gastroesophageal reflux disease)     HIV (human immunodeficiency virus infection) ([address])     Hyperlipidemia     Hypertension     Muscle contraction headache 2022    Prostate enlargement      Past Surgical History:   Procedure Laterality Date    ABDOMEN SURGERY      colon resection d/t paralytic ileus from blunt trauma to abdomen     ANKLE SURGERY Right     APPENDECTOMY      COLONOSCOPY      FOOT SURGERY Bilateral     hammertoe    HAMMER TOE SURGERY Left 3/22/2019    CONDYLECTOMY 4TH TOE HAMMERTOE CORRECTION  5TH TOE LEFT FOOT. V TO Y SKIN PLASTY 5TH METATARSAL PHALANGEAL JOINT LEFT FOOT performed by Patricia Chang DPM at [address] Left     axilla & Rt side neck    OTHER SURGICAL HISTORY Left 2019    hammertoe correction 4th and 5th toes left foot, skin plasty 5th MPJ

## 2023-07-24 NOTE — PROGRESS NOTES
[address] [facility] PRE-ADMISSION TESTING INSTRUCTIONS    The Preadmission Testing patient is instructed accordingly using the following criteria (check applicable):    ARRIVAL INSTRUCTIONS:  [x] Parking the day of Surgery is located in the Main Entrance lot. Upon entering the door, make an immediate right to the surgery reception desk    [x] Bring photo ID and insurance card    [] Bring in a copy of Living will or Durable Power of  papers. [x] Please be sure to arrange for responsible adult to provide transportation to and from the hospital    [x] Please arrange for responsible adult to be with you for the 24 hour period post procedure due to having anesthesia    [x] If you awake am of surgery not feeling well or have temperature >100 please call [phone]    GENERAL INSTRUCTIONS:    [x] Nothing by mouth after midnight, including gum, candy, mints or water    [x] You may brush your teeth, but do not swallow any water    [] Take medications as instructed with 1-2 oz of water    [] Stop herbal supplements and vitamins 5 days prior to procedure    [x] Follow preop dosing of blood thinners per physician instructions    [] Take 1/2 dose of evening insulin, but no insulin after midnight    [] No oral diabetic medications after midnight    [] If diabetic and have low blood sugar or feel symptomatic, take 1-2oz apple juice only    [] Bring inhalers day of surgery    [] Bring C-PAP/ Bi-Pap day of surgery    [] Bring urine specimen day of surgery    [x] Shower or bath with soap, lather and rinse well, AM of Surgery, no lotion, powders or creams to surgical site    [] Follow bowel prep as instructed per surgeon    [x] No tobacco products within 24 hours of surgery     [x] No alcohol or illegal drug use within 24 hours of surgery.     [x] Jewelry, body piercing's, eyeglasses, contact lenses and dentures are not permitted into surgery (bring cases)      [] Please do not wear any nail polish, make

## 2023-07-25 ENCOUNTER — ANESTHESIA EVENT (OUTPATIENT)
Dept: OPERATING ROOM | Age: 68
End: 2023-07-25
Payer: COMMERCIAL

## 2023-07-26 ENCOUNTER — HOSPITAL ENCOUNTER (OUTPATIENT)
Age: 68
Setting detail: OUTPATIENT SURGERY
Discharge: HOME | End: 2023-07-26
Attending: STUDENT IN AN ORGANIZED HEALTH CARE EDUCATION/TRAINING PROGRAM | Admitting: STUDENT IN AN ORGANIZED HEALTH CARE EDUCATION/TRAINING PROGRAM
Payer: COMMERCIAL

## 2023-07-26 ENCOUNTER — ANESTHESIA (OUTPATIENT)
Dept: OPERATING ROOM | Age: 68
End: 2023-07-26
Payer: COMMERCIAL

## 2023-07-26 VITALS
TEMPERATURE: 97.6 F | BODY MASS INDEX: 34.13 KG/M2 | RESPIRATION RATE: 16 BRPM | HEART RATE: 90 BPM | DIASTOLIC BLOOD PRESSURE: 80 MMHG | WEIGHT: 295 LBS | SYSTOLIC BLOOD PRESSURE: 116 MMHG | HEIGHT: 78 IN | OXYGEN SATURATION: 94 %

## 2023-07-26 DIAGNOSIS — N40.0 PROSTATE ENLARGEMENT: Primary | ICD-10-CM

## 2023-07-26 PROCEDURE — 3700000001 HC ADD 15 MINUTES (ANESTHESIA): Performed by: STUDENT IN AN ORGANIZED HEALTH CARE EDUCATION/TRAINING PROGRAM

## 2023-07-26 PROCEDURE — 3600000012 HC SURGERY LEVEL 2 ADDTL 15MIN: Performed by: STUDENT IN AN ORGANIZED HEALTH CARE EDUCATION/TRAINING PROGRAM

## 2023-07-26 PROCEDURE — 6370000000 HC RX 637 (ALT 250 FOR IP): Performed by: ANESTHESIOLOGY

## 2023-07-26 PROCEDURE — 6360000002 HC RX W HCPCS: Performed by: NURSE ANESTHETIST, CERTIFIED REGISTERED

## 2023-07-26 PROCEDURE — C1889 IMPLANT/INSERT DEVICE, NOC: HCPCS | Performed by: STUDENT IN AN ORGANIZED HEALTH CARE EDUCATION/TRAINING PROGRAM

## 2023-07-26 PROCEDURE — 2580000003 HC RX 258: Performed by: NURSE PRACTITIONER

## 2023-07-26 PROCEDURE — 7100000011 HC PHASE II RECOVERY - ADDTL 15 MIN: Performed by: STUDENT IN AN ORGANIZED HEALTH CARE EDUCATION/TRAINING PROGRAM

## 2023-07-26 PROCEDURE — 3600000002 HC SURGERY LEVEL 2 BASE: Performed by: STUDENT IN AN ORGANIZED HEALTH CARE EDUCATION/TRAINING PROGRAM

## 2023-07-26 PROCEDURE — 2500000003 HC RX 250 WO HCPCS: Performed by: NURSE ANESTHETIST, CERTIFIED REGISTERED

## 2023-07-26 PROCEDURE — 3700000000 HC ANESTHESIA ATTENDED CARE: Performed by: STUDENT IN AN ORGANIZED HEALTH CARE EDUCATION/TRAINING PROGRAM

## 2023-07-26 PROCEDURE — 2709999900 HC NON-CHARGEABLE SUPPLY: Performed by: STUDENT IN AN ORGANIZED HEALTH CARE EDUCATION/TRAINING PROGRAM

## 2023-07-26 PROCEDURE — 2580000003 HC RX 258: Performed by: STUDENT IN AN ORGANIZED HEALTH CARE EDUCATION/TRAINING PROGRAM

## 2023-07-26 PROCEDURE — 7100000010 HC PHASE II RECOVERY - FIRST 15 MIN: Performed by: STUDENT IN AN ORGANIZED HEALTH CARE EDUCATION/TRAINING PROGRAM

## 2023-07-26 PROCEDURE — 6360000002 HC RX W HCPCS: Performed by: STUDENT IN AN ORGANIZED HEALTH CARE EDUCATION/TRAINING PROGRAM

## 2023-07-26 DEVICE — DEVICE IMPLANT UROLIFT2 FOR TREAT OF URIN OUTFLO: Type: IMPLANTABLE DEVICE | Site: BLADDER | Status: FUNCTIONAL

## 2023-07-26 DEVICE — SYSTEM UROLIFT2 W/ IMPL DEL DEV FOR TREAT OF URIN OUTFLO: Type: IMPLANTABLE DEVICE | Site: BLADDER | Status: FUNCTIONAL

## 2023-07-26 RX ORDER — MIDAZOLAM HYDROCHLORIDE 1 MG/ML
INJECTION INTRAMUSCULAR; INTRAVENOUS PRN
Status: DISCONTINUED | OUTPATIENT
Start: 2023-07-26 | End: 2023-07-26 | Stop reason: SDUPTHER

## 2023-07-26 RX ORDER — SODIUM CHLORIDE 9 MG/ML
INJECTION, SOLUTION INTRAVENOUS PRN
Status: DISCONTINUED | OUTPATIENT
Start: 2023-07-26 | End: 2023-07-26 | Stop reason: HOSPADM

## 2023-07-26 RX ORDER — FENTANYL CITRATE 50 UG/ML
INJECTION, SOLUTION INTRAMUSCULAR; INTRAVENOUS PRN
Status: DISCONTINUED | OUTPATIENT
Start: 2023-07-26 | End: 2023-07-26 | Stop reason: SDUPTHER

## 2023-07-26 RX ORDER — KETAMINE HYDROCHLORIDE 10 MG/ML
INJECTION INTRAMUSCULAR; INTRAVENOUS PRN
Status: DISCONTINUED | OUTPATIENT
Start: 2023-07-26 | End: 2023-07-26 | Stop reason: SDUPTHER

## 2023-07-26 RX ORDER — SODIUM CHLORIDE 0.9 % (FLUSH) 0.9 %
5-40 SYRINGE (ML) INJECTION EVERY 12 HOURS SCHEDULED
Status: DISCONTINUED | OUTPATIENT
Start: 2023-07-26 | End: 2023-07-26 | Stop reason: HOSPADM

## 2023-07-26 RX ORDER — ONDANSETRON 2 MG/ML
INJECTION INTRAMUSCULAR; INTRAVENOUS PRN
Status: DISCONTINUED | OUTPATIENT
Start: 2023-07-26 | End: 2023-07-26 | Stop reason: SDUPTHER

## 2023-07-26 RX ORDER — SODIUM CHLORIDE 0.9 % (FLUSH) 0.9 %
5-40 SYRINGE (ML) INJECTION PRN
Status: DISCONTINUED | OUTPATIENT
Start: 2023-07-26 | End: 2023-07-26 | Stop reason: HOSPADM

## 2023-07-26 RX ORDER — PROPOFOL 10 MG/ML
INJECTION, EMULSION INTRAVENOUS CONTINUOUS PRN
Status: DISCONTINUED | OUTPATIENT
Start: 2023-07-26 | End: 2023-07-26 | Stop reason: SDUPTHER

## 2023-07-26 RX ORDER — OXYCODONE HYDROCHLORIDE AND ACETAMINOPHEN 5; 325 MG/1; MG/1
1 TABLET ORAL EVERY 6 HOURS PRN
Qty: 12 TABLET | Refills: 0 | Status: SHIPPED | OUTPATIENT
Start: 2023-07-26 | End: 2023-07-29

## 2023-07-26 RX ORDER — OXYCODONE HYDROCHLORIDE AND ACETAMINOPHEN 5; 325 MG/1; MG/1
1 TABLET ORAL ONCE
Status: COMPLETED | OUTPATIENT
Start: 2023-07-26 | End: 2023-07-26

## 2023-07-26 RX ORDER — GLYCOPYRROLATE 0.2 MG/ML
INJECTION INTRAMUSCULAR; INTRAVENOUS PRN
Status: DISCONTINUED | OUTPATIENT
Start: 2023-07-26 | End: 2023-07-26 | Stop reason: SDUPTHER

## 2023-07-26 RX ADMIN — PROPOFOL 150 MCG/KG/MIN: 10 INJECTION, EMULSION INTRAVENOUS at 10:47

## 2023-07-26 RX ADMIN — GLYCOPYRROLATE 0.2 MG: 0.2 INJECTION INTRAMUSCULAR; INTRAVENOUS at 10:49

## 2023-07-26 RX ADMIN — SODIUM CHLORIDE: 9 INJECTION, SOLUTION INTRAVENOUS at 10:44

## 2023-07-26 RX ADMIN — WATER 3000 MG: 1 INJECTION INTRAMUSCULAR; INTRAVENOUS; SUBCUTANEOUS at 10:50

## 2023-07-26 RX ADMIN — OXYCODONE HYDROCHLORIDE AND ACETAMINOPHEN 1 TABLET: 5; 325 TABLET ORAL at 12:11

## 2023-07-26 RX ADMIN — MIDAZOLAM 1 MG: 1 INJECTION INTRAMUSCULAR; INTRAVENOUS at 10:47

## 2023-07-26 RX ADMIN — FENTANYL CITRATE 50 MCG: 50 INJECTION, SOLUTION INTRAMUSCULAR; INTRAVENOUS at 10:47

## 2023-07-26 RX ADMIN — MIDAZOLAM 1 MG: 1 INJECTION INTRAMUSCULAR; INTRAVENOUS at 10:44

## 2023-07-26 RX ADMIN — KETAMINE HYDROCHLORIDE 30 MG: 10 INJECTION INTRAMUSCULAR; INTRAVENOUS at 10:49

## 2023-07-26 RX ADMIN — ONDANSETRON 4 MG: 2 INJECTION INTRAMUSCULAR; INTRAVENOUS at 10:53

## 2023-07-26 ASSESSMENT — PAIN DESCRIPTION - DESCRIPTORS: DESCRIPTORS: DISCOMFORT

## 2023-07-26 ASSESSMENT — LIFESTYLE VARIABLES: SMOKING_STATUS: 0

## 2023-07-26 ASSESSMENT — PAIN DESCRIPTION - ORIENTATION: ORIENTATION: INNER

## 2023-07-26 ASSESSMENT — PAIN - FUNCTIONAL ASSESSMENT: PAIN_FUNCTIONAL_ASSESSMENT: ACTIVITIES ARE NOT PREVENTED

## 2023-07-26 ASSESSMENT — PAIN DESCRIPTION - ONSET: ONSET: ON-GOING

## 2023-07-26 ASSESSMENT — PAIN DESCRIPTION - PAIN TYPE: TYPE: SURGICAL PAIN

## 2023-07-26 ASSESSMENT — PAIN DESCRIPTION - LOCATION: LOCATION: PENIS

## 2023-07-26 ASSESSMENT — PAIN SCALES - GENERAL
PAINLEVEL_OUTOF10: 4
PAINLEVEL_OUTOF10: 0

## 2023-07-26 ASSESSMENT — PAIN DESCRIPTION - FREQUENCY: FREQUENCY: CONTINUOUS

## 2023-07-26 NOTE — DISCHARGE INSTRUCTIONS
General:   -You will be groggy throughout the day due to the effects of anesthesia. Have a responsible adult monitor you for the next 24 hours.  -Call if fever or chills  -It is very normal to have burning and pain. If the pain is severe and out of your control contact Dr. Barber Lechuga. Diet: You may eat or drink whatever you like today. You may experience some nausea. Call if you have vomiting or inability to tolerate food or drink. Urine: Expect blood in your urine for the next week. This is normal.  This may be very traumatic in appearance but is not concerning unless there are large amount of clots that prevent you from being able to urinate. If you have any questions or concerns contact Dr. Andrei Bennett office. Expect significant frequency, urgency, and burning. Driving: You may not drive for 24 hours. You also may not drive if you are taking narcotic pain medications. Sexual activity: You may resume sexual activity once feeling better. Lifting: No lifting greater than 20 pounds for 1 week. Catheter: Remove Harrington catheter tomorrow morning by cutting the valve on the catheter as instructed by the nurse.

## 2023-07-26 NOTE — ANESTHESIA PRE PROCEDURE
Department of Anesthesiology  Preprocedure Note       Name:  Padma Covarrubias   Age:  69 y.o.  :  1955                                          MRN:  [id number]         Date:  2023      Surgeon: Mick Morrow):  Misa George MD    Procedure: Procedure(s):  CYSTOURETHROSCOPY WITH INSERTION OF PERMANENT ADJUSTABLE TRANSPROSTATIC IMPLANT    Medications prior to admission:   Prior to Admission medications    Medication Sig Start Date End Date Taking?  Authorizing Provider   ZEASORB-AF 2 % powder APPLY TO GROIN UP TO TWICE DAILY AS NEEDED 1/3/23   Historical Provider, MD   methocarbamol (ROBAXIN) 500 MG tablet Take 2 tablets by mouth as needed 3/13/23   Historical Provider, MD   triamcinolone (KENALOG) 0.1 % cream 2 times daily as needed 1/3/23   Historical Provider, MD   lisinopril-hydroCHLOROthiazide (PRINZIDE;ZESTORETIC) 20-12.5 MG per tablet Take 2 tablets by mouth daily    Historical Provider, MD   tamsulosin (FLOMAX) 0.4 MG capsule Take 1 capsule by mouth daily    Historical Provider, MD   finasteride (PROSCAR) 5 MG tablet Take 1 tablet by mouth daily    Historical Provider, MD   acetaminophen (TYLENOL) 325 MG tablet Take 2 tablets by mouth every 6 hours as needed 19   Historical Provider, MD   carboxymethylcellulose (REFRESH PLUS) 0.5 % SOLN ophthalmic solution Apply 1 drop to eye as needed 3/7/22   Historical Provider, MD   terbinafine (LAMISIL) 1 % cream Apply topically 3/29/22   Historical Provider, MD   apixaban (ELIQUIS) 5 MG TABS tablet Take 0.5 tablets by mouth 2 times daily 22   Historical Provider, MD   sodium chloride (OCEAN, BABY AYR) 0.65 % nasal spray 2 sprays by Nasal route as needed for Congestion (5 times daily)    Historical Provider, MD   omeprazole (PRILOSEC) 20 MG delayed release capsule Take 1 capsule by mouth at bedtime    Historical Provider, MD   Abacavir-Dolutegravir-Lamivud 600- MG TABS Take 1 tablet by mouth nightly    Historical Provider, MD   rosuvastatin

## 2023-07-26 NOTE — OP NOTE
Operative Note      Patient: Alie Barth  YOB: 1955  MRN: [id number]    Date of Procedure: 7/26/2023    Pre-Op Diagnosis Codes:     * Enlarged prostate with urinary obstruction [N40.1, N13.8]    Post-Op Diagnosis: Same       Procedure(s):  CYSTOURETHROSCOPY WITH INSERTION OF PERMANENT ADJUSTABLE TRANSPROSTATIC IMPLANT    Surgeon(s):  David Bui MD    Assistant:   * No surgical staff found *    Anesthesia: Monitor Anesthesia Care    Estimated Blood Loss (mL): Minimal    Complications: None    Specimens:   * No specimens in log *    Implants:  Implant Name Type Inv. Item Serial No.  Lot No. LRB No. Used Action   DEVICE IMPLANT UROLIFT2 FOR TREAT OF Adry Franki - [id number]  DEVICE IMPLANT UROLIFT2 FOR TREAT OF URIN OUTFLO  [employer] [id number] N/A 5 Implanted   SYSTEM UROLIFT2 W/ IMPL DEL DEV FOR TREAT OF URIN OUTFLO - [id number]  SYSTEM UROLIFT2 W/ IMPL DEL DEV FOR TREAT OF URIN OUTFLO  [employer] [id number] N/A 1 Implanted         Drains: * No LDAs found *    Findings: BPH, moderate trabeculations        Detailed Description of Procedure:       INDICATION FOR PROCEDURE:    Alie Barth is a 69 y.o. male with benign prostatic hyperplasia and bothersome voiding symptoms. He has a 60 g prostate with moderate trabeculation on office cystoscopy. He presents undergo UroLift procedure. After discussion of the surgical treatment options patient elected to undergo prostatic urethral lift procedure in which permanent transprostatic implants are installed to create a wider channel by which to void other surgical alternatives were declined due to patient preference. The patient understands the risks of this procedure including hematuria, infection, failure of this procedure to produce the desired symptomatic results, and the need for further procedures down the road. He signed informed consent and agrees to proceed.     OPERATIVE PROCEDURE:    Patient was brought to the operating room and placed under [facility] anesthesia in the dorsolithotomy position. Was prepped and draped in sterile fashion. A 20 Moroccan cystoscope was inserted into the bladder. The cystoscopy bridge was replaced with a UroLift delivery device. The prostate was inspected for treatment planning. The first treatment site was the patient's left side approximately 1.5 cm distal to the bladder neck. The distal tip of the delivery device was then angled laterally approximately 20 degrees at this position to compress the lateral lobe. The needle was then retracted allowing one end of the implant to be delivered to the capsular surface of the prostate. The implant was then tensioned to assure capsular seating and removal of slack monofilament. Urethral end piece was then affixed to the monofilament thereby tailoring the size of the implant. Excess filament was then severed. The same procedure was repeated on the right side 1.5 cm distal to the bladder neck. The prostate was then reevaluated and more implants were placed in the same fashion. A total of 4 implants were placed on the left and 2 on the right. No implants were delivered distal to the verumontanum to avoid the external striated sphincter. A final cystoscopy was then performed and showed an open channel for urination. A Harrington catheter was inserted. The patient was awakened from anesthesia and taken recovery in stable condition. PLAN:    A total of 6 lifts were utilized during this procedure. Patient will have his Harrington taken out tomorrow.   He will follow-up in the office in 1 month with a postvoid residual.      Electronically signed by Misa George MD on 7/26/2023 at 11:11 AM

## 2023-07-27 NOTE — ANESTHESIA POSTPROCEDURE EVALUATION
Department of Anesthesiology  Postprocedure Note    Patient: Shasta Bell  MRN: [id number]  YOB: 1955  Date of evaluation: 7/26/2023      Procedure Summary     Date: 07/26/23 Room / Location: [facility] OR 06 / [facility]    Anesthesia Start: 5389 Anesthesia Stop: 1117    Procedure: CYSTOURETHROSCOPY WITH INSERTION OF PERMANENT ADJUSTABLE TRANSPROSTATIC IMPLANT (Bladder) Diagnosis:       Enlarged prostate with urinary obstruction      (Enlarged prostate with urinary obstruction [N40.1, N13.8])    Surgeons: Lilly Cast MD Responsible Provider: Dania Pena MD    Anesthesia Type: MAC ASA Status: 3          Anesthesia Type: No value filed.     Cookie Phase I: Cookie Score: 10    Cookie Phase II: Cookie Score: 9      Anesthesia Post Evaluation    Patient location during evaluation: PACU  Patient participation: complete - patient participated  Level of consciousness: awake and alert  Pain score: 2  Airway patency: patent  Nausea & Vomiting: no vomiting and no nausea  Complications: no  Cardiovascular status: hemodynamically stable  Respiratory status: spontaneous ventilation, nonlabored ventilation and acceptable  Hydration status: stable  Multimodal analgesia pain management approach

## 2023-08-09 NOTE — PROGRESS NOTES
Chief Complaint   Patient presents with    Knee Pain     6 week follow up left knee pain. Pt doing pt but pain is not getting better         HPI:    Patient is 69 y.o. male complaining of new issue of chronic, atraumatic, insidious onset left knee pain for several weeks. He admits to stiffness, deep, aching pain, swelling, difficulty with stairs and ambulating far distances. He admits to gross instability specifically in his Left knee. Previous treatments include rest, ice, and anti-inflammatory medication and HEP without much relief. Left knee f/u still having pain mostly on the back of the knee, and some in the front. 6 week follow up left knee pain. Pt doing pt but pain is not getting better      ROS:    Skin: (-) rash,(-) psoriasis,(-) eczema, (-)skin cancer. Neurologic: (-)numbness, (-)tingling, (-)headaches, (-) LOC. Cardiovascular: (-) Chest pain, (-) swelling in legs/feet, (-) SOB, (-) cramping in legs/feet with walking.     All other review of systems negative except stated above or in HPI      Past Medical History:   Diagnosis Date    Arthritis     Cervical (neck) region somatic dysfunction 05/09/2022    Cervicogenic headache 05/09/2022    Chronic kidney disease     COPD (chronic obstructive pulmonary disease) ([facility])     DVT of lower extremity (deep venous thrombosis) ([address]) 2007    post foot surgery      GERD (gastroesophageal reflux disease)     HIV (human immunodeficiency virus infection) ([address])     Hx of blood clots     Hyperlipidemia     Hypertension     Muscle contraction headache 05/09/2022    Prostate enlargement      Past Surgical History:   Procedure Laterality Date    ABDOMEN SURGERY  1970    colon resection d/t paralytic ileus from blunt trauma to abdomen     ANKLE SURGERY Right     APPENDECTOMY      COLONOSCOPY      CYSTOSCOPY N/A 7/26/2023    CYSTOURETHROSCOPY WITH INSERTION OF PERMANENT ADJUSTABLE TRANSPROSTATIC IMPLANT performed by Dominique Morgan MD at [address] [facility]

## 2023-08-10 ENCOUNTER — OFFICE VISIT (OUTPATIENT)
Dept: ORTHOPEDIC SURGERY | Age: 68
End: 2023-08-10
Payer: COMMERCIAL

## 2023-08-10 VITALS — HEIGHT: 78 IN | TEMPERATURE: 98 F | BODY MASS INDEX: 32.74 KG/M2 | WEIGHT: 283 LBS

## 2023-08-10 DIAGNOSIS — M25.362 KNEE INSTABILITY, LEFT: ICD-10-CM

## 2023-08-10 DIAGNOSIS — M17.10 PATELLOFEMORAL ARTHRITIS: Primary | ICD-10-CM

## 2023-08-10 DIAGNOSIS — S83.207A TEAR OF MENISCUS OF LEFT KNEE, UNSPECIFIED MENISCUS, UNSPECIFIED TEAR TYPE, UNSPECIFIED WHETHER OLD OR CURRENT TEAR: ICD-10-CM

## 2023-08-10 DIAGNOSIS — Z71.82 EXERCISE COUNSELING: ICD-10-CM

## 2023-08-10 DIAGNOSIS — S80.02XA CONTUSION OF LEFT KNEE, INITIAL ENCOUNTER: ICD-10-CM

## 2023-08-10 PROCEDURE — 3017F COLORECTAL CA SCREEN DOC REV: CPT | Performed by: ORTHOPAEDIC SURGERY

## 2023-08-10 PROCEDURE — G8427 DOCREV CUR MEDS BY ELIG CLIN: HCPCS | Performed by: ORTHOPAEDIC SURGERY

## 2023-08-10 PROCEDURE — 1123F ACP DISCUSS/DSCN MKR DOCD: CPT | Performed by: ORTHOPAEDIC SURGERY

## 2023-08-10 PROCEDURE — 1036F TOBACCO NON-USER: CPT | Performed by: ORTHOPAEDIC SURGERY

## 2023-08-10 PROCEDURE — G8417 CALC BMI ABV UP PARAM F/U: HCPCS | Performed by: ORTHOPAEDIC SURGERY

## 2023-08-10 PROCEDURE — 99214 OFFICE O/P EST MOD 30 MIN: CPT | Performed by: ORTHOPAEDIC SURGERY

## 2023-08-14 ENCOUNTER — OFFICE VISIT (OUTPATIENT)
Dept: PRIMARY CARE CLINIC | Age: 68
End: 2023-08-14
Payer: COMMERCIAL

## 2023-08-14 VITALS
SYSTOLIC BLOOD PRESSURE: 110 MMHG | HEART RATE: 90 BPM | OXYGEN SATURATION: 93 % | BODY MASS INDEX: 33.97 KG/M2 | DIASTOLIC BLOOD PRESSURE: 62 MMHG | WEIGHT: 293.6 LBS | RESPIRATION RATE: 16 BRPM | TEMPERATURE: 97 F | HEIGHT: 78 IN

## 2023-08-14 DIAGNOSIS — Z86.718 HISTORY OF RECURRENT DEEP VEIN THROMBOSIS (DVT): ICD-10-CM

## 2023-08-14 DIAGNOSIS — N18.2 CKD (CHRONIC KIDNEY DISEASE) STAGE 2, GFR 60-89 ML/MIN: Chronic | ICD-10-CM

## 2023-08-14 DIAGNOSIS — L30.9 ECZEMA, UNSPECIFIED TYPE: ICD-10-CM

## 2023-08-14 DIAGNOSIS — N40.0 BENIGN PROSTATIC HYPERPLASIA WITHOUT LOWER URINARY TRACT SYMPTOMS: ICD-10-CM

## 2023-08-14 DIAGNOSIS — Z86.73 HISTORY OF CVA (CEREBROVASCULAR ACCIDENT): ICD-10-CM

## 2023-08-14 DIAGNOSIS — S83.242D ACUTE MEDIAL MENISCUS TEAR OF LEFT KNEE, SUBSEQUENT ENCOUNTER: ICD-10-CM

## 2023-08-14 DIAGNOSIS — M17.12 LOCALIZED OSTEOARTHRITIS OF LEFT KNEE: ICD-10-CM

## 2023-08-14 DIAGNOSIS — B20 CURRENTLY ASYMPTOMATIC HIV INFECTION, WITH HISTORY OF HIV-RELATED ILLNESS (HCC): ICD-10-CM

## 2023-08-14 DIAGNOSIS — Z87.891 HISTORY OF TOBACCO ABUSE: ICD-10-CM

## 2023-08-14 DIAGNOSIS — Z01.818 PRE-OP EXAM: Primary | ICD-10-CM

## 2023-08-14 DIAGNOSIS — J44.9 COPD WITHOUT EXACERBATION (HCC): Chronic | ICD-10-CM

## 2023-08-14 PROCEDURE — 93000 ELECTROCARDIOGRAM COMPLETE: CPT | Performed by: INTERNAL MEDICINE

## 2023-08-14 PROCEDURE — G8417 CALC BMI ABV UP PARAM F/U: HCPCS | Performed by: INTERNAL MEDICINE

## 2023-08-14 PROCEDURE — 1036F TOBACCO NON-USER: CPT | Performed by: INTERNAL MEDICINE

## 2023-08-14 PROCEDURE — G8427 DOCREV CUR MEDS BY ELIG CLIN: HCPCS | Performed by: INTERNAL MEDICINE

## 2023-08-14 PROCEDURE — 3078F DIAST BP <80 MM HG: CPT | Performed by: INTERNAL MEDICINE

## 2023-08-14 PROCEDURE — 3074F SYST BP LT 130 MM HG: CPT | Performed by: INTERNAL MEDICINE

## 2023-08-14 PROCEDURE — 1123F ACP DISCUSS/DSCN MKR DOCD: CPT | Performed by: INTERNAL MEDICINE

## 2023-08-14 PROCEDURE — 3017F COLORECTAL CA SCREEN DOC REV: CPT | Performed by: INTERNAL MEDICINE

## 2023-08-14 PROCEDURE — 3023F SPIROM DOC REV: CPT | Performed by: INTERNAL MEDICINE

## 2023-08-14 PROCEDURE — 99214 OFFICE O/P EST MOD 30 MIN: CPT | Performed by: INTERNAL MEDICINE

## 2023-08-14 RX ORDER — TRIAMCINOLONE ACETONIDE 1 MG/G
CREAM TOPICAL 2 TIMES DAILY
Qty: 15 G | Refills: 0 | Status: SHIPPED | OUTPATIENT
Start: 2023-08-14

## 2023-08-18 ENCOUNTER — PREP FOR PROCEDURE (OUTPATIENT)
Dept: ORTHOPEDIC SURGERY | Age: 68
End: 2023-08-18

## 2023-08-18 ENCOUNTER — TELEPHONE (OUTPATIENT)
Dept: ORTHOPEDIC SURGERY | Age: 68
End: 2023-08-18

## 2023-08-18 PROBLEM — S83.207A TEAR OF MENISCUS OF LEFT KNEE: Status: ACTIVE | Noted: 2023-08-18

## 2023-08-18 NOTE — TELEPHONE ENCOUNTER
Prior Authorization Form:      DEMOGRAPHICS:                     Patient Name:  Kim Carbajal  Patient :  1955            Insurance:  Payor: Kenia Ayala / Plan: Jyoti Christopher / Product Type: *No Product type* /   Insurance ID Number:    Payer/Plan Subscr  Sex Relation Sub.  Ins. ID Effective Group Num   1Robert Dudley 1955 Male Self [id number] 10/1/19 [id number]                                   [address]         DIAGNOSIS & PROCEDURE:                       Procedure/Operation:  LEFT KNEE ARTHROSCOPY MENISECTOMY AND DEBRIDEMENT          CPT Code: 42400    Diagnosis:  LEFT KNEE MENISCUS TEAR    ICD10 Code: U76.345R    Location:  [facility]     Surgeon:  DR Erma Valerio    SCHEDULING INFORMATION:                          Date:   2023  Time:     TBD          Anesthesia:  General                                                       Status:  Outpatient        Special Comments:         Electronically signed by Delia Mcmillan MA on 2023 at 12:18 PM Nidia Salcedo

## 2023-08-31 RX ORDER — SODIUM CHLORIDE, SODIUM LACTATE, POTASSIUM CHLORIDE, CALCIUM CHLORIDE 600; 310; 30; 20 MG/100ML; MG/100ML; MG/100ML; MG/100ML
INJECTION, SOLUTION INTRAVENOUS CONTINUOUS
OUTPATIENT
Start: 2023-08-31

## 2023-09-05 ENCOUNTER — HOSPITAL ENCOUNTER (OUTPATIENT)
Dept: PREADMISSION TESTING | Age: 68
Discharge: HOME | End: 2023-09-05
Payer: COMMERCIAL

## 2023-09-05 VITALS
DIASTOLIC BLOOD PRESSURE: 83 MMHG | HEART RATE: 79 BPM | BODY MASS INDEX: 33.67 KG/M2 | RESPIRATION RATE: 16 BRPM | HEIGHT: 78 IN | WEIGHT: 291 LBS | SYSTOLIC BLOOD PRESSURE: 117 MMHG | OXYGEN SATURATION: 96 % | TEMPERATURE: 97.1 F

## 2023-09-05 DIAGNOSIS — Z01.818 PRE-OP TESTING: Primary | ICD-10-CM

## 2023-09-05 LAB
ANION GAP SERPL CALCULATED.3IONS-SCNC: 10 MMOL/L (ref 7–16)
BASOPHILS # BLD: 0.07 K/UL (ref 0–0.2)
BASOPHILS NFR BLD: 1 % (ref 0–2)
BUN SERPL-MCNC: 24 MG/DL (ref 6–23)
CALCIUM SERPL-MCNC: 9.5 MG/DL (ref 8.6–10.2)
CHLORIDE SERPL-SCNC: 104 MMOL/L (ref 98–107)
CO2 SERPL-SCNC: 25 MMOL/L (ref 22–29)
CREAT SERPL-MCNC: 1.3 MG/DL (ref 0.7–1.2)
EOSINOPHIL # BLD: 0.37 K/UL (ref 0.05–0.5)
EOSINOPHILS RELATIVE PERCENT: 5 % (ref 0–6)
ERYTHROCYTE [DISTWIDTH] IN BLOOD BY AUTOMATED COUNT: 13.6 % (ref 11.5–15)
GFR SERPL CREATININE-BSD FRML MDRD: 58 ML/MIN/1.73M2
GLUCOSE SERPL-MCNC: 110 MG/DL (ref 74–99)
HCT VFR BLD AUTO: 43.4 % (ref 37–54)
HGB BLD-MCNC: 14.6 G/DL (ref 12.5–16.5)
IMM GRANULOCYTES # BLD AUTO: <0.03 K/UL (ref 0–0.58)
IMM GRANULOCYTES NFR BLD: 0 % (ref 0–5)
LYMPHOCYTES NFR BLD: 1.67 K/UL (ref 1.5–4)
LYMPHOCYTES RELATIVE PERCENT: 20 % (ref 20–42)
MCH RBC QN AUTO: 31.7 PG (ref 26–35)
MCHC RBC AUTO-ENTMCNC: 33.6 G/DL (ref 32–34.5)
MCV RBC AUTO: 94.3 FL (ref 80–99.9)
MONOCYTES NFR BLD: 0.62 K/UL (ref 0.1–0.95)
MONOCYTES NFR BLD: 8 % (ref 2–12)
NEUTROPHILS NFR BLD: 67 % (ref 43–80)
NEUTS SEG NFR BLD: 5.47 K/UL (ref 1.8–7.3)
PLATELET # BLD AUTO: 244 K/UL (ref 130–450)
PMV BLD AUTO: 9.5 FL (ref 7–12)
POTASSIUM SERPL-SCNC: 4.3 MMOL/L (ref 3.5–5)
RBC # BLD AUTO: 4.6 M/UL (ref 3.8–5.8)
SODIUM SERPL-SCNC: 139 MMOL/L (ref 132–146)
WBC OTHER # BLD: 8.2 K/UL (ref 4.5–11.5)

## 2023-09-05 PROCEDURE — 85025 COMPLETE CBC W/AUTO DIFF WBC: CPT

## 2023-09-05 PROCEDURE — 80048 BASIC METABOLIC PNL TOTAL CA: CPT

## 2023-09-05 NOTE — PROGRESS NOTES
[address]                                                                                                                    PRE OP INSTRUCTIONS FOR  Humberto Mayers        Date: 9/5/2023    Date of surgery: 9/11/23       Arrival Time: Hospital will call you the Friday prior between 5pm and 7pm with your final arrival time for surgery    Nothing by mouth (NPO) as instructed._____after midnight_____    Take the following medications with a small sip of water on the morning of Surgery: proscar, flomax, robaxin if needed, tylenol if needed, eye drops as needed, nose spray as needed     Diabetics may take evening dose of insulin but none after midnight. If you feel symptomatic or low blood sugar morning of surgery drink 1-2 ounces of apple juice only. Aspirin, Ibuprofen, Advil, Naproxen, Vitamin E and other Anti-inflammatory products should be stopped  before surgery  as directed by your physician. Take Tylenol only unless instructed otherwise by your surgeon. Check with your Doctor regarding stopping Plavix, Coumadin, Lovenox, Eliquis, Effient, or other blood thinners. Do not smoke,use illicit drugs and do not drink any alcoholic beverages 24 hours prior to surgery. You may brush your teeth the morning of surgery. DO NOT SWALLOW WATER    You MUST make arrangements for a responsible adult to take you home after your surgery. You will not be allowed to leave alone or drive yourself home. It is strongly suggested someone stay with you the first 24 hrs. Your surgery will be cancelled if you do not have a ride home. PEDIATRIC PATIENTS ONLY:  A parent/legal guardian must accompany a child scheduled for surgery and plan to stay at the hospital until the child is discharged. Please do not bring other children with you.     Please wear simple, loose fitting clothing to the hospital.  Marv Chavez not bring valuables (money, credit cards, checkbooks, etc.) Do not wear any makeup (including

## 2023-09-10 ENCOUNTER — ANESTHESIA EVENT (OUTPATIENT)
Dept: OPERATING ROOM | Age: 68
End: 2023-09-10
Payer: COMMERCIAL

## 2023-09-11 ENCOUNTER — HOSPITAL ENCOUNTER (OUTPATIENT)
Age: 68
Setting detail: OUTPATIENT SURGERY
Discharge: HOME | End: 2023-09-11
Attending: ORTHOPAEDIC SURGERY | Admitting: ORTHOPAEDIC SURGERY
Payer: COMMERCIAL

## 2023-09-11 ENCOUNTER — ANESTHESIA (OUTPATIENT)
Dept: OPERATING ROOM | Age: 68
End: 2023-09-11
Payer: COMMERCIAL

## 2023-09-11 VITALS
RESPIRATION RATE: 18 BRPM | OXYGEN SATURATION: 95 % | TEMPERATURE: 97.2 F | BODY MASS INDEX: 33.78 KG/M2 | DIASTOLIC BLOOD PRESSURE: 89 MMHG | SYSTOLIC BLOOD PRESSURE: 147 MMHG | HEART RATE: 70 BPM | WEIGHT: 292 LBS | HEIGHT: 78 IN

## 2023-09-11 DIAGNOSIS — S83.207A TEAR OF MENISCUS OF LEFT KNEE AS CURRENT INJURY, UNSPECIFIED MENISCUS, UNSPECIFIED TEAR TYPE, INITIAL ENCOUNTER: Primary | ICD-10-CM

## 2023-09-11 PROCEDURE — 7100000011 HC PHASE II RECOVERY - ADDTL 15 MIN: Performed by: ORTHOPAEDIC SURGERY

## 2023-09-11 PROCEDURE — 7100000010 HC PHASE II RECOVERY - FIRST 15 MIN: Performed by: ORTHOPAEDIC SURGERY

## 2023-09-11 PROCEDURE — 6360000002 HC RX W HCPCS: Performed by: NURSE ANESTHETIST, CERTIFIED REGISTERED

## 2023-09-11 PROCEDURE — 2709999900 HC NON-CHARGEABLE SUPPLY: Performed by: ORTHOPAEDIC SURGERY

## 2023-09-11 PROCEDURE — 2580000003 HC RX 258: Performed by: ORTHOPAEDIC SURGERY

## 2023-09-11 PROCEDURE — 3700000001 HC ADD 15 MINUTES (ANESTHESIA): Performed by: ORTHOPAEDIC SURGERY

## 2023-09-11 PROCEDURE — 6360000002 HC RX W HCPCS: Performed by: ORTHOPAEDIC SURGERY

## 2023-09-11 PROCEDURE — 3600000003 HC SURGERY LEVEL 3 BASE: Performed by: ORTHOPAEDIC SURGERY

## 2023-09-11 PROCEDURE — 7100000000 HC PACU RECOVERY - FIRST 15 MIN: Performed by: ORTHOPAEDIC SURGERY

## 2023-09-11 PROCEDURE — 29876 ARTHRS KNEE SURG SYNVCT MAJ: CPT | Performed by: ORTHOPAEDIC SURGERY

## 2023-09-11 PROCEDURE — 2500000003 HC RX 250 WO HCPCS: Performed by: ORTHOPAEDIC SURGERY

## 2023-09-11 PROCEDURE — 3700000000 HC ANESTHESIA ATTENDED CARE: Performed by: ORTHOPAEDIC SURGERY

## 2023-09-11 PROCEDURE — 2580000003 HC RX 258: Performed by: ANESTHESIOLOGY

## 2023-09-11 PROCEDURE — 2580000003 HC RX 258: Performed by: NURSE ANESTHETIST, CERTIFIED REGISTERED

## 2023-09-11 PROCEDURE — 7100000001 HC PACU RECOVERY - ADDTL 15 MIN: Performed by: ORTHOPAEDIC SURGERY

## 2023-09-11 PROCEDURE — 2720000010 HC SURG SUPPLY STERILE: Performed by: ORTHOPAEDIC SURGERY

## 2023-09-11 PROCEDURE — 3600000013 HC SURGERY LEVEL 3 ADDTL 15MIN: Performed by: ORTHOPAEDIC SURGERY

## 2023-09-11 RX ORDER — SODIUM CHLORIDE 0.9 % (FLUSH) 0.9 %
5-40 SYRINGE (ML) INJECTION EVERY 12 HOURS SCHEDULED
Status: DISCONTINUED | OUTPATIENT
Start: 2023-09-11 | End: 2023-09-11 | Stop reason: HOSPADM

## 2023-09-11 RX ORDER — CEFAZOLIN 2 G/1
INJECTION, POWDER, FOR SOLUTION INTRAMUSCULAR; INTRAVENOUS PRN
Status: DISCONTINUED | OUTPATIENT
Start: 2023-09-11 | End: 2023-09-11

## 2023-09-11 RX ORDER — LABETALOL HYDROCHLORIDE 5 MG/ML
10 INJECTION, SOLUTION INTRAVENOUS
Status: DISCONTINUED | OUTPATIENT
Start: 2023-09-11 | End: 2023-09-11 | Stop reason: HOSPADM

## 2023-09-11 RX ORDER — HYDRALAZINE HYDROCHLORIDE 20 MG/ML
10 INJECTION INTRAMUSCULAR; INTRAVENOUS
Status: DISCONTINUED | OUTPATIENT
Start: 2023-09-11 | End: 2023-09-11 | Stop reason: HOSPADM

## 2023-09-11 RX ORDER — SODIUM CHLORIDE, SODIUM LACTATE, POTASSIUM CHLORIDE, CALCIUM CHLORIDE 600; 310; 30; 20 MG/100ML; MG/100ML; MG/100ML; MG/100ML
INJECTION, SOLUTION INTRAVENOUS CONTINUOUS PRN
Status: DISCONTINUED | OUTPATIENT
Start: 2023-09-11 | End: 2023-09-11 | Stop reason: SDUPTHER

## 2023-09-11 RX ORDER — MIDAZOLAM HYDROCHLORIDE 1 MG/ML
INJECTION INTRAMUSCULAR; INTRAVENOUS PRN
Status: DISCONTINUED | OUTPATIENT
Start: 2023-09-11 | End: 2023-09-11 | Stop reason: SDUPTHER

## 2023-09-11 RX ORDER — LIDOCAINE HYDROCHLORIDE 20 MG/ML
INJECTION, SOLUTION INTRAVENOUS PRN
Status: DISCONTINUED | OUTPATIENT
Start: 2023-09-11 | End: 2023-09-11 | Stop reason: SDUPTHER

## 2023-09-11 RX ORDER — LORATADINE 10 MG/1
10 CAPSULE, LIQUID FILLED ORAL DAILY
COMMUNITY

## 2023-09-11 RX ORDER — SODIUM CHLORIDE 0.9 % (FLUSH) 0.9 %
5-40 SYRINGE (ML) INJECTION PRN
Status: DISCONTINUED | OUTPATIENT
Start: 2023-09-11 | End: 2023-09-11 | Stop reason: HOSPADM

## 2023-09-11 RX ORDER — ONDANSETRON 4 MG/1
4 TABLET, FILM COATED ORAL EVERY 6 HOURS PRN
Qty: 20 TABLET | Refills: 1 | Status: SHIPPED | OUTPATIENT
Start: 2023-09-11

## 2023-09-11 RX ORDER — BUPIVACAINE HYDROCHLORIDE AND EPINEPHRINE 5; 5 MG/ML; UG/ML
INJECTION, SOLUTION EPIDURAL; INTRACAUDAL; PERINEURAL PRN
Status: DISCONTINUED | OUTPATIENT
Start: 2023-09-11 | End: 2023-09-11 | Stop reason: ALTCHOICE

## 2023-09-11 RX ORDER — DIPHENHYDRAMINE HYDROCHLORIDE 50 MG/ML
12.5 INJECTION INTRAMUSCULAR; INTRAVENOUS
Status: DISCONTINUED | OUTPATIENT
Start: 2023-09-11 | End: 2023-09-11 | Stop reason: HOSPADM

## 2023-09-11 RX ORDER — SODIUM CHLORIDE, SODIUM LACTATE, POTASSIUM CHLORIDE, CALCIUM CHLORIDE 600; 310; 30; 20 MG/100ML; MG/100ML; MG/100ML; MG/100ML
INJECTION, SOLUTION INTRAVENOUS CONTINUOUS
Status: DISCONTINUED | OUTPATIENT
Start: 2023-09-11 | End: 2023-09-11 | Stop reason: HOSPADM

## 2023-09-11 RX ORDER — SODIUM CHLORIDE 9 MG/ML
INJECTION, SOLUTION INTRAVENOUS PRN
Status: DISCONTINUED | OUTPATIENT
Start: 2023-09-11 | End: 2023-09-11 | Stop reason: HOSPADM

## 2023-09-11 RX ORDER — MEPERIDINE HYDROCHLORIDE 25 MG/ML
12.5 INJECTION INTRAMUSCULAR; INTRAVENOUS; SUBCUTANEOUS EVERY 5 MIN PRN
Status: DISCONTINUED | OUTPATIENT
Start: 2023-09-11 | End: 2023-09-11 | Stop reason: HOSPADM

## 2023-09-11 RX ORDER — PROCHLORPERAZINE EDISYLATE 5 MG/ML
5 INJECTION INTRAMUSCULAR; INTRAVENOUS
Status: DISCONTINUED | OUTPATIENT
Start: 2023-09-11 | End: 2023-09-11 | Stop reason: HOSPADM

## 2023-09-11 RX ORDER — HALOPERIDOL 5 MG/ML
1 INJECTION INTRAMUSCULAR
Status: DISCONTINUED | OUTPATIENT
Start: 2023-09-11 | End: 2023-09-11 | Stop reason: HOSPADM

## 2023-09-11 RX ORDER — PROPOFOL 10 MG/ML
INJECTION, EMULSION INTRAVENOUS PRN
Status: DISCONTINUED | OUTPATIENT
Start: 2023-09-11 | End: 2023-09-11 | Stop reason: SDUPTHER

## 2023-09-11 RX ORDER — DEXAMETHASONE SODIUM PHOSPHATE 10 MG/ML
INJECTION, SOLUTION INTRAMUSCULAR; INTRAVENOUS PRN
Status: DISCONTINUED | OUTPATIENT
Start: 2023-09-11 | End: 2023-09-11 | Stop reason: SDUPTHER

## 2023-09-11 RX ORDER — OXYCODONE HYDROCHLORIDE AND ACETAMINOPHEN 5; 325 MG/1; MG/1
1 TABLET ORAL EVERY 6 HOURS PRN
Qty: 28 TABLET | Refills: 0 | Status: SHIPPED | OUTPATIENT
Start: 2023-09-11 | End: 2023-09-18

## 2023-09-11 RX ORDER — IPRATROPIUM BROMIDE AND ALBUTEROL SULFATE 2.5; .5 MG/3ML; MG/3ML
1 SOLUTION RESPIRATORY (INHALATION)
Status: DISCONTINUED | OUTPATIENT
Start: 2023-09-11 | End: 2023-09-11 | Stop reason: HOSPADM

## 2023-09-11 RX ORDER — DOCUSATE SODIUM 100 MG/1
100 CAPSULE, LIQUID FILLED ORAL 2 TIMES DAILY PRN
Qty: 40 CAPSULE | Refills: 1 | Status: SHIPPED | OUTPATIENT
Start: 2023-09-11

## 2023-09-11 RX ORDER — ONDANSETRON 2 MG/ML
INJECTION INTRAMUSCULAR; INTRAVENOUS PRN
Status: DISCONTINUED | OUTPATIENT
Start: 2023-09-11 | End: 2023-09-11 | Stop reason: SDUPTHER

## 2023-09-11 RX ORDER — HYDROMORPHONE HYDROCHLORIDE 1 MG/ML
0.25 INJECTION, SOLUTION INTRAMUSCULAR; INTRAVENOUS; SUBCUTANEOUS EVERY 5 MIN PRN
Status: DISCONTINUED | OUTPATIENT
Start: 2023-09-11 | End: 2023-09-11 | Stop reason: HOSPADM

## 2023-09-11 RX ORDER — HYDROMORPHONE HYDROCHLORIDE 1 MG/ML
0.5 INJECTION, SOLUTION INTRAMUSCULAR; INTRAVENOUS; SUBCUTANEOUS EVERY 5 MIN PRN
Status: DISCONTINUED | OUTPATIENT
Start: 2023-09-11 | End: 2023-09-11 | Stop reason: HOSPADM

## 2023-09-11 RX ORDER — CEFAZOLIN 2 G/1
INJECTION, POWDER, FOR SOLUTION INTRAMUSCULAR; INTRAVENOUS PRN
Status: DISCONTINUED | OUTPATIENT
Start: 2023-09-11 | End: 2023-09-11 | Stop reason: SDUPTHER

## 2023-09-11 RX ORDER — FENTANYL CITRATE 50 UG/ML
INJECTION, SOLUTION INTRAMUSCULAR; INTRAVENOUS PRN
Status: DISCONTINUED | OUTPATIENT
Start: 2023-09-11 | End: 2023-09-11 | Stop reason: SDUPTHER

## 2023-09-11 RX ADMIN — DEXAMETHASONE SODIUM PHOSPHATE 10 MG: 10 INJECTION, SOLUTION INTRAMUSCULAR; INTRAVENOUS at 16:12

## 2023-09-11 RX ADMIN — LIDOCAINE HYDROCHLORIDE 100 MG: 20 INJECTION, SOLUTION INTRAVENOUS at 16:06

## 2023-09-11 RX ADMIN — PROPOFOL 200 MG: 10 INJECTION, EMULSION INTRAVENOUS at 16:06

## 2023-09-11 RX ADMIN — CEFAZOLIN 2 G: 2 INJECTION, POWDER, FOR SOLUTION INTRAMUSCULAR; INTRAVENOUS at 16:00

## 2023-09-11 RX ADMIN — SODIUM CHLORIDE, POTASSIUM CHLORIDE, SODIUM LACTATE AND CALCIUM CHLORIDE: 600; 310; 30; 20 INJECTION, SOLUTION INTRAVENOUS at 15:55

## 2023-09-11 RX ADMIN — MIDAZOLAM 2 MG: 1 INJECTION INTRAMUSCULAR; INTRAVENOUS at 16:00

## 2023-09-11 RX ADMIN — FENTANYL CITRATE 50 MCG: 50 INJECTION, SOLUTION INTRAMUSCULAR; INTRAVENOUS at 16:06

## 2023-09-11 RX ADMIN — PHENYLEPHRINE HYDROCHLORIDE 100 MCG: 10 INJECTION INTRAVENOUS at 16:32

## 2023-09-11 RX ADMIN — SODIUM CHLORIDE, POTASSIUM CHLORIDE, SODIUM LACTATE AND CALCIUM CHLORIDE: 600; 310; 30; 20 INJECTION, SOLUTION INTRAVENOUS at 13:22

## 2023-09-11 RX ADMIN — PHENYLEPHRINE HYDROCHLORIDE 200 MCG: 10 INJECTION INTRAVENOUS at 16:39

## 2023-09-11 RX ADMIN — CEFAZOLIN 1 G: 2 INJECTION, POWDER, FOR SOLUTION INTRAMUSCULAR; INTRAVENOUS at 16:12

## 2023-09-11 RX ADMIN — ONDANSETRON 4 MG: 2 INJECTION INTRAMUSCULAR; INTRAVENOUS at 16:44

## 2023-09-11 ASSESSMENT — PAIN - FUNCTIONAL ASSESSMENT: PAIN_FUNCTIONAL_ASSESSMENT: 0-10

## 2023-09-11 ASSESSMENT — COPD QUESTIONNAIRES: CAT_SEVERITY: MODERATE

## 2023-09-11 ASSESSMENT — LIFESTYLE VARIABLES: SMOKING_STATUS: 0

## 2023-09-11 NOTE — PROGRESS NOTES
9/11/23 1315 dr Sanchez Maker aware that pt took his lisinopril/hctz today at 0800. Aware of current bp of 116/72.  Kmo rn

## 2023-09-11 NOTE — BRIEF OP NOTE
Brief Postoperative Note      Patient: Roseanna Watson  YOB: 1955  MRN: [id number]    Date of Procedure: 9/11/2023    PREOPERATIVE DIAGNOSIS:  1. Left knee osteoarthritis  2. Left knee medial meniscus tear. 3. Left knee synovitis. POSTOPERATIVE DIAGNOSIS:  1. Left knee osteoarthritis  2. Left knee medial meniscus tear. 3. Left knee synovitis. TITLE OF OPERATION:   1. Left knee arthroscopic chondroplasty  2. Left knee arthroscopic synovectomy     Post-Op Diagnosis: Same       Procedure(s):  LEFT KNEE ARTHROSCOPY MENISECTOMY AND DEBRIDEMENT CHONDROPLASTY    Surgeon(s):   Kaye Valadez DO    Assistant:  Resident: Scott Verduzco DO; Dennise Chanel DO; Alpesh Lake DO    Anesthesia: General    Estimated Blood Loss (mL): 5    Complications: None    Specimens:   * No specimens in log *    Implants:  * No implants in log *      Drains: * No LDAs found *    Findings: see report        Electronically signed by Kaye Valadez DO on 9/11/2023 at 4:43 PM

## 2023-09-11 NOTE — PROGRESS NOTES
CLINICAL PHARMACY NOTE: MEDS TO BEDS    Total # of Prescriptions Filled: 3   The following medications were delivered to the patient:  Percocet 5/325 mg  Ondansetron 4 mg  Docusate sodium 100 mg    Additional Documentation:  Delivered to desk at [facility]

## 2023-09-11 NOTE — H&P
I have reviewed the history and physical and examined the patient and find no relevant changes. I have reviewed with the patient and/or family the risks, benefits, and alternatives to the procedure. The patient was counseled at length about the risks of la Covid-19 during their perioperative period and any recovery window from their procedure. The patient was made aware that la Covid-19  may worsen their prognosis for recovering from their procedure  and lend to a higher morbidity and/or mortality risk. All material risks, benefits, and reasonable alternatives including postponing the procedure were discussed. The patient does wish to proceed with the procedure at this time.         Hugo Watters DO  9/11/2023

## 2023-09-11 NOTE — OP NOTE
Operative Note      Patient: Reese Abdullahi  YOB: 1955  MRN: [id number]    Date of Procedure: 9/11/2023       PREOPERATIVE DIAGNOSIS:  1. Left knee osteoarthritis  2. Left knee medial meniscus tear. 3. Left knee synovitis. POSTOPERATIVE DIAGNOSIS:  1. Left knee osteoarthritis  2. Left knee medial meniscus tear. 3. Left knee synovitis. TITLE OF OPERATION:   1. Left knee arthroscopic chondroplasty  2. Left knee arthroscopic synovectomy         Assistant:   Resident: Cam Bautista DO; Gurjit Bailey DO; Mahendra Brownlee DO    Anesthesia: General    Estimated Blood Loss (mL): 5    Complications: None    Specimens:   * No specimens in log *    Implants:  * No implants in log *      Drains: * No LDAs found *        Detailed Description of Procedure:        INDICATIONS: This is a 69 y.o. male experiencing progressive pain, swelling, effusion, catching, mechanical symptoms and MRI with the above findings. He elects to undergo the above-stated procedure and understands the risks and benefits. Patient was treated conservatively for osteoarthritis with little to no relief. He was found to have a degenerative meniscal tear. PROCEDURE:  The patient was taken to the operating room, placed supine on the operating table, underwent general anesthesia without difficulty. The Left knee demonstrated negative Lachman, negative anterior-posterior drawer. No varus or valgus instability. The left leg was placed in a well-padded leg watson and prepped and draped in a sterile fashion. The arthroscope was inserted through the inferolateral portal instrumentation medially and outflow superolateral. Inspection of the patellofemoral joint demonstrated synovitis throughout medial and lateral gutter, intercondylar notch, treated with synovectomy with ArthroCare mechanical shaver. Articular cartilage of the trochlea and the patella demonstrated grade 2 changes which was treated with chondroplasty.  There were

## 2023-09-11 NOTE — H&P
CC: Left Knee Pain         HPI:    Patient is 69 y.o. male complaining of new issue of chronic, atraumatic, insidious onset left knee pain for several weeks. He admits to stiffness, deep, aching pain, swelling, difficulty with stairs and ambulating far distances. He admits to gross instability specifically in his Left knee. Previous treatments include rest, ice, and anti-inflammatory medication and HEP without much relief. Left knee f/u still having pain mostly on the back of the knee, and some in the front. 6 week follow up left knee pain. Pt doing pt but pain is not getting better      ROS:    Skin: (-) rash,(-) psoriasis,(-) eczema, (-)skin cancer. Neurologic: (-)numbness, (-)tingling, (-)headaches, (-) LOC. Cardiovascular: (-) Chest pain, (-) swelling in legs/feet, (-) SOB, (-) cramping in legs/feet with walking.     All other review of systems negative except stated above or in HPI      Past Medical History:   Diagnosis Date    Arthritis     Cervical (neck) region somatic dysfunction 05/09/2022    Cervicogenic headache 05/09/2022    Chronic kidney disease     COPD (chronic obstructive pulmonary disease) ([facility])     DVT of lower extremity (deep venous thrombosis) ([address]) 2007    post foot surgery      GERD (gastroesophageal reflux disease)     HIV (human immunodeficiency virus infection) ([address])     Hx of blood clots     Hyperlipidemia     Hypertension     Muscle contraction headache 05/09/2022    Pituitary tumor     surgically removed 80%, being monitored    Prostate enlargement      Past Surgical History:   Procedure Laterality Date    ABDOMEN SURGERY  1970    colon resection d/t paralytic ileus from blunt trauma to abdomen     ANKLE SURGERY Right     [facility]  2018    Dr. Dillon Vásquez, pituary tumor resection    COLONOSCOPY      CYSTOSCOPY N/A 07/26/2023    CYSTOURETHROSCOPY WITH INSERTION OF PERMANENT ADJUSTABLE TRANSPROSTATIC IMPLANT performed by Di Roberts MD at [address] patient was made aware that la Covid-19  may worsen their prognosis for recovering from their procedure  and lend to a higher morbidity and/or mortality risk. All material risks, benefits, and reasonable alternatives including postponing the procedure were discussed. The patient does wish to proceed with the procedure at this time. In a 15 minute assessment and discussion, patient was counseled on weight loss, healthy diet, and physical activity relating to this condition. He was educated with options in detail including nutrition, joining a health club/ weight loss program, and use of cardio equipment such as the Arc Trainer and the importance of use as well as range of motion and HEP exercises for weight loss and general health.          Jono Bradshaw, DO

## 2023-09-12 LAB
CHOLESTEROL, TOTAL: NORMAL
CHOLESTEROL/HDL RATIO: NORMAL
HDLC SERPL-MCNC: NORMAL MG/DL
LDL CHOLESTEROL CALCULATED: NORMAL
NONHDLC SERPL-MCNC: NORMAL MG/DL
TRIGL SERPL-MCNC: NORMAL MG/DL
VLDLC SERPL CALC-MCNC: NORMAL MG/DL

## 2023-09-12 NOTE — ANESTHESIA POSTPROCEDURE EVALUATION
Department of Anesthesiology  Postprocedure Note    Patient: Delphina Severe  MRN: [id number]  YOB: 1955  Date of evaluation: 9/12/2023      Procedure Summary     Date: 09/11/23 Room / Location: [address] [place] [address] / [id number] [address]    Anesthesia Start: 9123 Anesthesia Stop: 1656    Procedure: LEFT KNEE ARTHROSCOPY MENISECTOMY AND DEBRIDEMENT CHONDROPLASTY (Left: Knee) Diagnosis:       Tear of meniscus of left knee      (Tear of meniscus of left knee [S83.207A])    Surgeons: Ericka Naranjo DO Responsible Provider: Judith Cornell MD    Anesthesia Type: general ASA Status: 3          Anesthesia Type: No value filed.     Cookie Phase I: Cookie Score: 10    Cookie Phase II: Cookie Score: 10      Anesthesia Post Evaluation    Patient location during evaluation: PACU  Patient participation: complete - patient participated  Level of consciousness: awake  Airway patency: patent  Nausea & Vomiting: no nausea and no vomiting  Complications: no  Cardiovascular status: hemodynamically stable  Respiratory status: acceptable  Hydration status: euvolemic  Pain management: adequate

## 2023-09-27 ENCOUNTER — OFFICE VISIT (OUTPATIENT)
Dept: ORTHOPEDIC SURGERY | Age: 68
End: 2023-09-27

## 2023-09-27 VITALS — WEIGHT: 285 LBS | HEIGHT: 78 IN | BODY MASS INDEX: 32.97 KG/M2 | TEMPERATURE: 98 F

## 2023-09-27 DIAGNOSIS — Z98.890 S/P LEFT KNEE ARTHROSCOPY: Primary | ICD-10-CM

## 2023-09-27 PROCEDURE — 99024 POSTOP FOLLOW-UP VISIT: CPT | Performed by: ORTHOPAEDIC SURGERY

## 2023-09-27 NOTE — PROGRESS NOTES
Chief Complaint   Patient presents with    Post-Op Check     Left knee arthroscopy f/u DOS 9/11/23. Doing well. Subjective:        Ron Vegas is here for follow-up after left knee arthroscopy. Findings at surgery: medial meniscus tear, OA. The patient is not having any pain. The patient denies fever, wound drainage, increasing redness, pus, increasing pain, increasing swelling. Post op problems reported: none. He is ambulating with walker baseline. Objective:           General :    alert, appears stated age, and cooperative   Gait:  Abnormal.   Sutures:   Sutures out. Incision:  healing well, no significant drainage, no dehiscence, no significant erythema   Tenderness:  none   Flexion ROM:  full range of motion   Extension ROM:  diminished range of motion   Effusion:  mild   DVT Evaluation:  No evidence of DVT seen on physical exam.           Assessment:     Delfin Verdin was seen today for post-op check. Diagnoses and all orders for this visit:    S/P left knee arthroscopy           Plan:      Surgical pictures from the surgery were reveiwed with the patient  Sutures removed today. Begin local wound cares. Wound care discussed. Range of motion and rehabilitation exercises discussed with the patient. Physical Therapy for post-operative rehabilitation. Full weight bearing. Follow up: 4 weeks.        Boni Ray DO

## 2023-10-09 ENCOUNTER — OFFICE VISIT (OUTPATIENT)
Dept: PRIMARY CARE CLINIC | Age: 68
End: 2023-10-09

## 2023-10-09 VITALS
HEART RATE: 76 BPM | WEIGHT: 290 LBS | TEMPERATURE: 97.3 F | SYSTOLIC BLOOD PRESSURE: 120 MMHG | BODY MASS INDEX: 33.55 KG/M2 | OXYGEN SATURATION: 92 % | HEIGHT: 78 IN | DIASTOLIC BLOOD PRESSURE: 72 MMHG

## 2023-10-09 DIAGNOSIS — N40.0 BENIGN PROSTATIC HYPERPLASIA WITHOUT LOWER URINARY TRACT SYMPTOMS: ICD-10-CM

## 2023-10-09 DIAGNOSIS — E78.2 MIXED HYPERLIPIDEMIA: ICD-10-CM

## 2023-10-09 DIAGNOSIS — Z12.11 COLON CANCER SCREENING: ICD-10-CM

## 2023-10-09 DIAGNOSIS — N18.2 CKD (CHRONIC KIDNEY DISEASE) STAGE 2, GFR 60-89 ML/MIN: Chronic | ICD-10-CM

## 2023-10-09 DIAGNOSIS — I10 PRIMARY HYPERTENSION: Primary | ICD-10-CM

## 2023-10-09 DIAGNOSIS — E55.9 VITAMIN D DEFICIENCY: ICD-10-CM

## 2023-10-09 DIAGNOSIS — Z79.01 CHRONIC ANTICOAGULATION: ICD-10-CM

## 2023-10-09 PROCEDURE — G8484 FLU IMMUNIZE NO ADMIN: HCPCS | Performed by: INTERNAL MEDICINE

## 2023-10-09 PROCEDURE — 3017F COLORECTAL CA SCREEN DOC REV: CPT | Performed by: INTERNAL MEDICINE

## 2023-10-09 PROCEDURE — 1036F TOBACCO NON-USER: CPT | Performed by: INTERNAL MEDICINE

## 2023-10-09 PROCEDURE — G8417 CALC BMI ABV UP PARAM F/U: HCPCS | Performed by: INTERNAL MEDICINE

## 2023-10-09 PROCEDURE — 1123F ACP DISCUSS/DSCN MKR DOCD: CPT | Performed by: INTERNAL MEDICINE

## 2023-10-09 PROCEDURE — 3074F SYST BP LT 130 MM HG: CPT | Performed by: INTERNAL MEDICINE

## 2023-10-09 PROCEDURE — 3078F DIAST BP <80 MM HG: CPT | Performed by: INTERNAL MEDICINE

## 2023-10-09 PROCEDURE — G8427 DOCREV CUR MEDS BY ELIG CLIN: HCPCS | Performed by: INTERNAL MEDICINE

## 2023-10-09 RX ORDER — ALBUTEROL SULFATE 90 UG/1
AEROSOL, METERED RESPIRATORY (INHALATION)
COMMUNITY
Start: 2023-07-19

## 2023-10-09 RX ORDER — LISINOPRIL 20 MG/1
20 TABLET ORAL DAILY
Qty: 90 TABLET | Refills: 1 | Status: SHIPPED | OUTPATIENT
Start: 2023-10-09

## 2023-10-20 LAB — NONINV COLON CA DNA+OCC BLD SCRN STL QL: NEGATIVE

## 2023-10-31 ENCOUNTER — OFFICE VISIT (OUTPATIENT)
Dept: ORTHOPEDIC SURGERY | Age: 68
End: 2023-10-31

## 2023-10-31 VITALS — TEMPERATURE: 98 F | BODY MASS INDEX: 32.97 KG/M2 | HEIGHT: 78 IN | WEIGHT: 285 LBS

## 2023-10-31 DIAGNOSIS — Z98.890 S/P LEFT KNEE ARTHROSCOPY: Primary | ICD-10-CM

## 2023-10-31 PROCEDURE — 99024 POSTOP FOLLOW-UP VISIT: CPT | Performed by: ORTHOPAEDIC SURGERY

## 2023-10-31 NOTE — PROGRESS NOTES
Chief Complaint   Patient presents with    Knee Pain     Left knee arthroscopy f/u doing well. Subjective:        Kim Carbajal is here for follow-up after left knee arthroscopy. Findings at surgery: medial meniscus tear, OA. The patient is not having any pain. The patient denies fever, wound drainage, increasing redness, pus, increasing pain, increasing swelling. Post op problems reported: none. He is ambulating with walker baseline. Objective:           General :    alert, appears stated age, and cooperative   Gait:  Abnormal.   Sutures:   Sutures out. Incision:  healing well, no significant drainage, no dehiscence, no significant erythema   Tenderness:  none   Flexion ROM:  full range of motion   Extension ROM:  full range of motion   Effusion:  mild   DVT Evaluation:  No evidence of DVT seen on physical exam.           Assessment:     Donita Mccoy was seen today for knee pain. Diagnoses and all orders for this visit:    S/P left knee arthroscopy             Plan:     Surgical pictures from the surgery were reveiwed with the patient  Begin local wound cares. Wound care discussed. Range of motion and rehabilitation exercises discussed with the patient. Physical Therapy for post-operative rehabilitation. Full weight bearing. Follow up: 8 weeks.        Arinana Maynard DO

## 2024-01-05 NOTE — PROGRESS NOTES
(ELIQUIS) 5 MG TABS tablet, Take 0.5 tablets by mouth 2 times daily, Disp: , Rfl:     sodium chloride (OCEAN, BABY AYR) 0.65 % nasal spray, 2 sprays by Nasal route as needed for Congestion (5 times daily), Disp: , Rfl:     omeprazole (PRILOSEC) 20 MG delayed release capsule, Take 1 capsule by mouth at bedtime, Disp: , Rfl:     Abacavir-Dolutegravir-Lamivud 600- MG TABS, Take 1 tablet by mouth nightly, Disp: , Rfl:     rosuvastatin (CRESTOR) 40 MG tablet, Take 1 tablet by mouth every evening, Disp: , Rfl:   Allergies   Allergen Reactions    Cat Hair Extract     Other      Inside birds    Seasonal     Ultram [Tramadol Hcl] Itching     Social History     Socioeconomic History    Marital status: Single     Spouse name: Not on file    Number of children: Not on file    Years of education: Not on file    Highest education level: Not on file   Occupational History    Not on file   Tobacco Use    Smoking status: Former     Current packs/day: 0.00     Average packs/day: 1 pack/day for 40.0 years (40.0 ttl pk-yrs)     Types: Cigarettes     Start date: 1979     Quit date: 2019     Years since quittin.0    Smokeless tobacco: Never   Vaping Use    Vaping Use: Never used   Substance and Sexual Activity    Alcohol use: No    Drug use: No    Sexual activity: Never   Other Topics Concern    Not on file   Social History Narrative    Not on file     Social Determinants of Health     Financial Resource Strain: Low Risk  (3/8/2023)    Overall Financial Resource Strain (CARDIA)     Difficulty of Paying Living Expenses: Not hard at all   Food Insecurity: Not on file (3/8/2023)   Transportation Needs: Unknown (3/8/2023)    PRAPARE - Transportation     Lack of Transportation (Medical): Not on file     Lack of Transportation (Non-Medical): No   Physical Activity: Not on file   Stress: Not on file   Social Connections: Not on file   Intimate Partner Violence: Not on file   Housing Stability: Unknown (3/8/2023)    Housing

## 2024-01-12 ENCOUNTER — OFFICE VISIT (OUTPATIENT)
Dept: ENT CLINIC | Age: 69
End: 2024-01-12
Payer: MEDICARE

## 2024-01-12 ENCOUNTER — OFFICE VISIT (OUTPATIENT)
Dept: ORTHOPEDIC SURGERY | Age: 69
End: 2024-01-12

## 2024-01-12 VITALS — WEIGHT: 296 LBS | BODY MASS INDEX: 34.25 KG/M2 | TEMPERATURE: 98 F | HEIGHT: 78 IN

## 2024-01-12 VITALS
BODY MASS INDEX: 34.35 KG/M2 | WEIGHT: 296.9 LBS | SYSTOLIC BLOOD PRESSURE: 136 MMHG | HEART RATE: 79 BPM | HEIGHT: 78 IN | DIASTOLIC BLOOD PRESSURE: 91 MMHG

## 2024-01-12 DIAGNOSIS — J34.2 DNS (DEVIATED NASAL SEPTUM): ICD-10-CM

## 2024-01-12 DIAGNOSIS — Z71.82 EXERCISE COUNSELING: ICD-10-CM

## 2024-01-12 DIAGNOSIS — M17.10 PATELLOFEMORAL ARTHRITIS: ICD-10-CM

## 2024-01-12 DIAGNOSIS — S83.207A TEAR OF MENISCUS OF LEFT KNEE, UNSPECIFIED MENISCUS, UNSPECIFIED TEAR TYPE, UNSPECIFIED WHETHER OLD OR CURRENT TEAR: ICD-10-CM

## 2024-01-12 DIAGNOSIS — J30.9 ALLERGIC RHINITIS, UNSPECIFIED SEASONALITY, UNSPECIFIED TRIGGER: Primary | ICD-10-CM

## 2024-01-12 DIAGNOSIS — Z98.890 S/P LEFT KNEE ARTHROSCOPY: Primary | ICD-10-CM

## 2024-01-12 DIAGNOSIS — J34.3 HYPERTROPHY OF NASAL TURBINATES: ICD-10-CM

## 2024-01-12 DIAGNOSIS — M25.362 KNEE INSTABILITY, LEFT: ICD-10-CM

## 2024-01-12 DIAGNOSIS — S80.02XA CONTUSION OF LEFT KNEE, INITIAL ENCOUNTER: ICD-10-CM

## 2024-01-12 PROCEDURE — G8427 DOCREV CUR MEDS BY ELIG CLIN: HCPCS | Performed by: OTOLARYNGOLOGY

## 2024-01-12 PROCEDURE — 3075F SYST BP GE 130 - 139MM HG: CPT | Performed by: OTOLARYNGOLOGY

## 2024-01-12 PROCEDURE — 1123F ACP DISCUSS/DSCN MKR DOCD: CPT | Performed by: OTOLARYNGOLOGY

## 2024-01-12 PROCEDURE — 3017F COLORECTAL CA SCREEN DOC REV: CPT | Performed by: OTOLARYNGOLOGY

## 2024-01-12 PROCEDURE — 3080F DIAST BP >= 90 MM HG: CPT | Performed by: OTOLARYNGOLOGY

## 2024-01-12 PROCEDURE — 1036F TOBACCO NON-USER: CPT | Performed by: OTOLARYNGOLOGY

## 2024-01-12 PROCEDURE — 99213 OFFICE O/P EST LOW 20 MIN: CPT | Performed by: OTOLARYNGOLOGY

## 2024-01-12 PROCEDURE — G8484 FLU IMMUNIZE NO ADMIN: HCPCS | Performed by: OTOLARYNGOLOGY

## 2024-01-12 PROCEDURE — G8417 CALC BMI ABV UP PARAM F/U: HCPCS | Performed by: OTOLARYNGOLOGY

## 2024-01-12 RX ORDER — TRIAMCINOLONE ACETONIDE 1 MG/G
OINTMENT TOPICAL 2 TIMES DAILY
Qty: 1 EACH | Refills: 2 | Status: SHIPPED | OUTPATIENT
Start: 2024-01-12 | End: 2024-01-19

## 2024-01-12 RX ORDER — TRIAMCINOLONE ACETONIDE 40 MG/ML
40 INJECTION, SUSPENSION INTRA-ARTICULAR; INTRAMUSCULAR ONCE
Status: COMPLETED | OUTPATIENT
Start: 2024-01-12 | End: 2024-01-12

## 2024-01-12 RX ORDER — AZELASTINE 1 MG/ML
2 SPRAY, METERED NASAL 2 TIMES DAILY
Qty: 120 ML | Refills: 1 | Status: SHIPPED | OUTPATIENT
Start: 2024-01-12

## 2024-01-12 RX ADMIN — TRIAMCINOLONE ACETONIDE 40 MG: 40 INJECTION, SUSPENSION INTRA-ARTICULAR; INTRAMUSCULAR at 11:02

## 2024-01-12 ASSESSMENT — ENCOUNTER SYMPTOMS
SHORTNESS OF BREATH: 0
VOICE CHANGE: 0
VOMITING: 0
TROUBLE SWALLOWING: 0
RHINORRHEA: 0
SORE THROAT: 0
COUGH: 0

## 2024-01-12 NOTE — PROGRESS NOTES
resident.      Patient here for follow up of medical problems.         Remainder of medical problems as per resident note.      CHI GARCIA, DO  1/13/24

## 2024-01-15 ENCOUNTER — EVALUATION (OUTPATIENT)
Dept: PHYSICAL THERAPY | Age: 69
End: 2024-01-15
Payer: MEDICARE

## 2024-01-15 DIAGNOSIS — Z98.890 S/P LEFT KNEE ARTHROSCOPY: Primary | ICD-10-CM

## 2024-01-15 PROCEDURE — 97162 PT EVAL MOD COMPLEX 30 MIN: CPT | Performed by: PHYSICAL THERAPIST

## 2024-01-15 NOTE — PROGRESS NOTES
Physical Therapy Daily Treatment Note    Date: 1/15/2024  Patient Name: Chucho Morales  : 1955   MRN: [id number]  DOInjury: --  DOSx: 23  Referring Provider: Jae Ayala DO   [place] [address]     Medical Diagnosis:      Diagnosis Orders   1. S/P left knee arthroscopy          Kang is 4 months post arthroscopic knee surgery.  Patient has good motion and no edema.  He does demonstrate L knee weakness.  Treatment will be a progressive LE strengthening program with the additional of balance exercise as appropriate.        Outcome Measure:   Lower Extremity Functional Scale (LEFS) 49% impairment    Precautions/Contraindications: falls risk due to bilateral foot drop worse with fatigue (not wearing AFO's).  All toe's fused -- will compromise calf strengthening, possibly leg strengthening.  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 Also reports bilateral foot drop; diagnosed 2 years ago.       X = TO BE PERFORMED NEXT VISIT  > = PROGRESS TO THIS    S: See eval  O:    Time  5174-0648       Visit  -8 Repeat outcome measure at mid point and end.    Pain    Pain 1-8/10     ROM  Good     Modalities          MO   Exercise       Nustep  ?  TE   Step-ups - FWD  X  TA   Step-ups - LAT ?  TA   [] TG  [] Leg Press 2-leg X  TE   [] TG  [] Leg Press 1-leg >?  TE   CR  May be limited due to toe fusion  TE   Knee Extension Machine X  TE   Marching gait >  NR   Side stepping >  NR               A: see nuzhat   P: Continue with rehab plan  Philip Adames PT    Treatment Charges: Mins Units   Initial Evaluation 30 1   Re-Evaluation     Ther Exercise         TE     Manual Therapy     MT     Ther Activities        TA     Gait Training          GT     Neuro Re-education NR     Modalities     Non-Billable Service Time     Other     Total Time/Units 30 1      
Review.  [] Demonstrates / verbalizes understanding of HEP / Ed previously given.    Frequency:  2 days per week for 3-4 weeks    Patient understands diagnosis/prognosis and consents to treatment, plan and goals: [x] Yes    [] No     Thank you for the opportunity to work with your patient.  If you have questions or comments, please contact me at [phone]; fax: [phone].    Electronically signed by: Philip Adames PT         Please sign Physician's Certification and return to:  [facility] PHYSICAL THERAPY  [facility]  [facility] [address]  Dept: [phone]  Dept Fax: [phone]  Loc: [phone]    Physician's Certification / Comments     Frequency/Duration 2 days per week for 3-4 weeks.   Certification period from 1/15/2024  to 4/15/2024.    I have reviewed the Plan of Care established for skilled therapy services and certify that the services are required and that they will be provided while the patient is under my care.    Physician's Comments/Revisions:               Physician's Printed Name:                                           Physician's Signature:                                                               Date:

## 2024-01-17 ENCOUNTER — TREATMENT (OUTPATIENT)
Dept: PHYSICAL THERAPY | Age: 69
End: 2024-01-17
Payer: MEDICARE

## 2024-01-17 DIAGNOSIS — Z98.890 S/P LEFT KNEE ARTHROSCOPY: Primary | ICD-10-CM

## 2024-01-17 PROCEDURE — 97112 NEUROMUSCULAR REEDUCATION: CPT

## 2024-01-17 NOTE — PROGRESS NOTES
Physical Therapy Daily Treatment Note    Date: 2024  Patient Name: Chucho Morales  : 1955   MRN: [id number]  DOInjury: --  DOSx: 23    Referring Provider:   Jae Ayala DO   [place] [address]         Medical Diagnosis:    Diagnosis Orders   1. S/P left knee arthroscopy            Kang is 4 months post arthroscopic knee surgery.  Patient has good motion and no edema. He does demonstrate L knee weakness. Treatment will be a progressive LE strengthening program with the additional of balance exercise as appropriate.        Outcome Measure: Lower Extremity Functional Scale (LEFS) 49% impairment    Precautions/Contraindications: falls risk due to bilateral foot drop worse with fatigue (not wearing AFO's).  All toe's fused -- will compromise calf strengthening, possibly leg strengthening.  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 Also reports bilateral foot drop; diagnosed 2 years ago.       X = TO BE PERFORMED NEXT VISIT  > = PROGRESS TO THIS    S: Patient reports minimal pain today. Mainly has pain in shin.   O:    Time  6231-1124      Visit  2/-8 Repeat outcome measure at mid point and end.    Pain    1/10     ROM  Good     Modalities          MO   Exercise       Nustep  L5 x 10 min  TE   Step-ups - FWD  6\" x 20 reps  TA   Step-ups - LAT ?  TA   [x] TG  [] Leg Press 2-leg L19 2 x 10  TE   [] TG  [] Leg Press 1-leg >?  TE   CR  May be limited due to toe fusion  TE   Knee Extension Machine 10# 2 x 10  TE   Marching gait >  NR   Side stepping >  NR               A: Patient tolerated new exercises well. Feedback and cues necessary for developing neuromuscular control.  Movement education and guided movement interventions such as verbal and tactile cues used to improve performance and control.   P: Continue with rehab plan  Ann Kay PTA    Treatment Charges: Mins Units   Initial Evaluation     Re-Evaluation     Ther Exercise

## 2024-01-22 ENCOUNTER — TREATMENT (OUTPATIENT)
Dept: PHYSICAL THERAPY | Age: 69
End: 2024-01-22
Payer: MEDICARE

## 2024-01-22 DIAGNOSIS — Z98.890 S/P LEFT KNEE ARTHROSCOPY: Primary | ICD-10-CM

## 2024-01-22 PROCEDURE — 97112 NEUROMUSCULAR REEDUCATION: CPT

## 2024-01-22 NOTE — PROGRESS NOTES
Physical Therapy Daily Treatment Note    Date: 2024  Patient Name: Chucho Morales  : 1955   MRN: [id number]  DOInjury: --  DOSx: 23    Referring Provider:   Jae Ayala DO   [place] [address]         Medical Diagnosis:    Diagnosis Orders   1. S/P left knee arthroscopy            Kang is 4 months post arthroscopic knee surgery.  Patient has good motion and no edema. He does demonstrate L knee weakness. Treatment will be a progressive LE strengthening program with the additional of balance exercise as appropriate.        Outcome Measure: Lower Extremity Functional Scale (LEFS) 49% impairment    Precautions/Contraindications: falls risk due to bilateral foot drop worse with fatigue (not wearing AFO's).  All toe's fused -- will compromise calf strengthening, possibly leg strengthening.  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 Also reports bilateral foot drop; diagnosed 2 years ago.       X = TO BE PERFORMED NEXT VISIT  > = PROGRESS TO THIS    S: Patient reports no changes.   O:    Time  4166-7807      Visit  36-8 Repeat outcome measure at mid point and end.    Pain    1/10     ROM  Good     Modalities          MO   Exercise       Nustep  L5 x 10 min  TE   Step-ups - FWD  6\" x 20 reps  TA   Step-ups - LAT ?  TA   [x] TG  [] Leg Press 2-leg L20 2 x 10  TE   [] TG  [] Leg Press 1-leg >?  TE   CR  Limited due to toe fusion  TE   Knee Extension Machine 10# 2 x 10  TE   Marching in place 3 x 10  New    Squats >     Hip abduction >     Marching gait >  NR   Side stepping >  NR               A: Patient tolerated new exercises well. Feedback and cues necessary for developing neuromuscular control.  Movement education and guided movement interventions such as verbal and tactile cues used to improve performance and control.   P: Continue with rehab plan  Ann Kay PTA    Treatment Charges: Mins Units   Initial Evaluation

## 2024-01-24 ENCOUNTER — TREATMENT (OUTPATIENT)
Dept: PHYSICAL THERAPY | Age: 69
End: 2024-01-24
Payer: MEDICARE

## 2024-01-24 DIAGNOSIS — Z98.890 S/P LEFT KNEE ARTHROSCOPY: Primary | ICD-10-CM

## 2024-01-24 PROCEDURE — 97112 NEUROMUSCULAR REEDUCATION: CPT

## 2024-01-24 NOTE — PROGRESS NOTES
Physical Therapy Daily Treatment Note    Date: 2024  Patient Name: Chcuho Morales  : 1955   MRN: [id number]  DOInjury: --  DOSx: 23    Referring Provider:   Jae Ayala DO   [place] [address]         Medical Diagnosis:    Diagnosis Orders   1. S/P left knee arthroscopy              Kang is 4 months post arthroscopic knee surgery.  Patient has good motion and no edema. He does demonstrate L knee weakness. Treatment will be a progressive LE strengthening program with the additional of balance exercise as appropriate.        Outcome Measure: Lower Extremity Functional Scale (LEFS) 49% impairment    Precautions/Contraindications: falls risk due to bilateral foot drop worse with fatigue (not wearing AFO's).  All toe's fused -- will compromise calf strengthening, possibly leg strengthening.  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 Also reports bilateral foot drop; diagnosed 2 years ago.       X = TO BE PERFORMED NEXT VISIT  > = PROGRESS TO THIS    S: Patient reports a little stiffness Monday night after PT but not too bad.   O:    Time  7879-0190      Visit  - Repeat outcome measure at mid point and end.    Pain    1/10     ROM  Good     Modalities          MO   Exercise       Nustep  L5 x 10 min  TE   Step-ups - FWD  7\" x 20 reps  TA   Step-ups - LAT ?  TA   [x] TG  [] Leg Press 2-leg L20 3 x 15 Leg press next TE   [] TG  [] Leg Press 1-leg >?  TE   CR  Limited due to toe fusion  TE   Knee Extension Machine 10# 3 x 10  TE   Marching in place 3 x 10      Squats 3 x 10     Hip abduction >     Marching gait >  NR   Side stepping >  NR               A: Patient tolerated new exercises well. Feedback and cues necessary for developing neuromuscular control.  Movement education and guided movement interventions such as verbal and tactile cues used to improve performance and control.   P: Continue with rehab plan  Ann Kay

## 2024-01-29 ENCOUNTER — TREATMENT (OUTPATIENT)
Dept: PHYSICAL THERAPY | Age: 69
End: 2024-01-29
Payer: MEDICARE

## 2024-01-29 DIAGNOSIS — Z98.890 S/P LEFT KNEE ARTHROSCOPY: Primary | ICD-10-CM

## 2024-01-29 PROCEDURE — 97112 NEUROMUSCULAR REEDUCATION: CPT

## 2024-01-29 NOTE — PROGRESS NOTES
Physical Therapy Daily Treatment Note    Date: 2024  Patient Name: Chucho Morales  : 1955   MRN: [id number]  DOInjury: --  DOSx: 23    Referring Provider:   Jae Ayala DO   [place] [address]         Medical Diagnosis:    Diagnosis Orders   1. S/P left knee arthroscopy            Kang is 4 months post arthroscopic knee surgery.  Patient has good motion and no edema. He does demonstrate L knee weakness. Treatment will be a progressive LE strengthening program with the additional of balance exercise as appropriate.        Outcome Measure: Lower Extremity Functional Scale (LEFS) 49% impairment    Precautions/Contraindications: falls risk due to bilateral foot drop worse with fatigue (not wearing AFO's).  All toe's fused -- will compromise calf strengthening, possibly leg strengthening.  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 Also reports bilateral foot drop; diagnosed 2 years ago.       X = TO BE PERFORMED NEXT VISIT  > = PROGRESS TO THIS    S: Patient reports a little more stiffness today but not bad.  O:    Time  9764-4701      Visit  -8 Repeat outcome measure at mid point and end.    Pain    1/10     ROM  Good     Modalities          MO   Exercise       Nustep  L5 x 10 min  TE   Step-ups - FWD  7\" x 20 reps  TA   Step-ups - LAT ?  TA   [] TG  [x] Leg Press 2-leg 40# 3 x 15  TE   [] TG  [] Leg Press 1-leg >?  TE   CR  Limited due to toe fusion  TE   Knee Extension Machine 10# 3 x 10  TE   Marching in place 3 x 15     Squats 3 x 10     Hip abduction >     Marching gait >  NR   Side stepping >  NR               A: Patient tolerated new exercises well. Feedback and cues necessary for developing neuromuscular control. Movement education and guided movement interventions such as verbal and tactile cues used to improve performance and control.   P: Continue with rehab plan  Ann Kay PTA    Treatment Charges: Mins Units

## 2024-01-31 ENCOUNTER — TREATMENT (OUTPATIENT)
Dept: PHYSICAL THERAPY | Age: 69
End: 2024-01-31
Payer: MEDICARE

## 2024-01-31 DIAGNOSIS — Z98.890 S/P LEFT KNEE ARTHROSCOPY: Primary | ICD-10-CM

## 2024-01-31 PROCEDURE — 97112 NEUROMUSCULAR REEDUCATION: CPT

## 2024-01-31 NOTE — PROGRESS NOTES
Physical Therapy Daily Treatment Note    Date: 2024  Patient Name: Chucho Morales  : 1955   MRN: [id number]  DOInjury: --  DOSx: 23    Referring Provider:   Jae Ayala DO   [place] [address]         Medical Diagnosis:    Diagnosis Orders   1. S/P left knee arthroscopy            Kang is 4 months post arthroscopic knee surgery. Patient has good motion and no edema. He does demonstrate L knee weakness. Treatment will be a progressive LE strengthening program with the additional of balance exercise as appropriate.        Outcome Measure: Lower Extremity Functional Scale (LEFS) 49% impairment    Precautions/Contraindications: falls risk due to bilateral foot drop worse with fatigue (not wearing AFO's).  All toe's fused -- will compromise calf strengthening, possibly leg strengthening.  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 Also reports bilateral foot drop; diagnosed 2 years ago.       X = TO BE PERFORMED NEXT VISIT  > = PROGRESS TO THIS    S: Patient reports stiffness.   O:    Time  3693-9260      Visit  - Repeat outcome measure at mid point and end.    Pain    1/10     ROM  Good     Modalities          MO   Exercise       Nustep  L5 x 10 min  TE   Step-ups - FWD  7\" x 20 reps  TA   Step-ups - LAT ?  TA   [] TG  [x] Leg Press 2-leg 60# 3 x 15  TE   [] TG  [] Leg Press 1-leg >?  TE   CR  Limited due to toe fusion  TE   Knee Extension Machine 10# 3 x 10  TE   Marching in place 3 x 15     Squats 3 x 10     Hip abduction >     Marching gait >  NR   Side stepping >  NR               A: Patient tolerated well. Feedback and cues necessary for developing neuromuscular control. Movement education and guided movement interventions such as verbal and tactile cues used to improve performance and control.   P: Continue with rehab plan  Ann Kay PTA    Treatment Charges: Mins Units   Initial Evaluation     Re-Evaluation

## 2024-02-05 ENCOUNTER — TREATMENT (OUTPATIENT)
Dept: PHYSICAL THERAPY | Age: 69
End: 2024-02-05
Payer: MEDICARE

## 2024-02-05 DIAGNOSIS — Z98.890 S/P LEFT KNEE ARTHROSCOPY: Primary | ICD-10-CM

## 2024-02-05 PROCEDURE — 97112 NEUROMUSCULAR REEDUCATION: CPT

## 2024-02-05 NOTE — PROGRESS NOTES
Physical Therapy Daily Treatment Note    Date: 2024  Patient Name: Cuhcho Morales  : 1955   MRN: [id number]  DOInjury: --  DOSx: 23    Referring Provider:   Jae Ayala DO   [place] [address]         Medical Diagnosis:    Diagnosis Orders   1. S/P left knee arthroscopy            Kang is 4 months post arthroscopic knee surgery. Patient has good motion and no edema. He does demonstrate L knee weakness. Treatment will be a progressive LE strengthening program with the additional of balance exercise as appropriate.        Outcome Measure: Lower Extremity Functional Scale (LEFS) 49% impairment    Precautions/Contraindications: falls risk due to bilateral foot drop worse with fatigue (not wearing AFO's).  All toe's fused -- will compromise calf strengthening, possibly leg strengthening.  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 Also reports bilateral foot drop; diagnosed 2 years ago.       X = TO BE PERFORMED NEXT VISIT  > = PROGRESS TO THIS    S: Patient reports stiffness continues. He had cramps in RLE yesterday but states this is from a prior surgery he had in that leg.   O:    Time  6387-9838      Visit  -8 Repeat outcome measure at mid point and end.    Pain    1/10     ROM  Good     Modalities          MO   Exercise       Nustep  L5 x 10 min  NR   Step-ups - FWD  7\" x 20 reps Perform FIRST NR   Step-ups - LAT ?  TA   [] TG  [x] Leg Press 2-leg 60# 3 x 15  NR   [] TG  [] Leg Press 1-leg >?  TE   CR  Limited due to toe fusion  TE   Hamstring Curl 30# 3 x 10  NR   Knee Extension Machine 10# 3 x 10  NR   Marching in place 3 x 15     Squats 3 x 10     Hip abduction >     Marching gait >  NR   Side stepping >  NR               A: Patient tolerated well. Feedback and cues necessary for developing neuromuscular control. Movement education and guided movement interventions such as verbal and tactile cues used to improve performance

## 2024-02-07 ENCOUNTER — TREATMENT (OUTPATIENT)
Dept: PHYSICAL THERAPY | Age: 69
End: 2024-02-07
Payer: MEDICARE

## 2024-02-07 DIAGNOSIS — Z98.890 S/P LEFT KNEE ARTHROSCOPY: Primary | ICD-10-CM

## 2024-02-07 PROCEDURE — 97112 NEUROMUSCULAR REEDUCATION: CPT

## 2024-02-07 NOTE — PROGRESS NOTES
Physical Therapy Daily Treatment Note    Date: 2024  Patient Name: Chucho Morales  : 1955   MRN: [id number]  DOInjury: --  DOSx: 23    Referring Provider:   Jae Ayala DO   [place] [address]         Medical Diagnosis:    Diagnosis Orders   1. S/P left knee arthroscopy              Kang is 4 months post arthroscopic knee surgery. Patient has good motion and no edema. He does demonstrate L knee weakness. Treatment will be a progressive LE strengthening program with the additional of balance exercise as appropriate.        Outcome Measure: Lower Extremity Functional Scale (LEFS) 49% impairment    Precautions/Contraindications: falls risk due to bilateral foot drop worse with fatigue (not wearing AFO's).  All toe's fused -- will compromise calf strengthening, possibly leg strengthening.  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 Also reports bilateral foot drop; diagnosed 2 years ago.       X = TO BE PERFORMED NEXT VISIT  > = PROGRESS TO THIS    S: Patient reports just a little stiffness today.  O:    Time  1537-4230      Visit  - Repeat outcome measure at mid point and end.    Pain    1/10     ROM  Good     Modalities          MO   Exercise       Nustep  L5 x 10 min  NR   Step-ups - FWD  7\" x 20 reps Perform FIRST NR   Step-ups - LAT ?  TA   [] TG  [x] Leg Press 2-leg 60# 3 x 15  NR   [] TG  [] Leg Press 1-leg >?  TE   CR  Limited due to toe fusion  TE   Hamstring Curl 50# 3 x 15  NR   Knee Extension Machine 10# 3 x 10  NR   Marching in place 3 x 15     Squats 3 x 10     Hip abduction >     Marching gait Next   NR   Side stepping >  NR               A: Patient tolerated well. Feedback and cues necessary for developing neuromuscular control. Movement education and guided movement interventions such as verbal and tactile cues used to improve performance and control.   P: Continue with rehab plan  Ann Kay

## 2024-02-12 ENCOUNTER — TREATMENT (OUTPATIENT)
Dept: PHYSICAL THERAPY | Age: 69
End: 2024-02-12
Payer: MEDICARE

## 2024-02-12 DIAGNOSIS — Z98.890 S/P LEFT KNEE ARTHROSCOPY: Primary | ICD-10-CM

## 2024-02-12 PROCEDURE — 97112 NEUROMUSCULAR REEDUCATION: CPT

## 2024-02-12 NOTE — PROGRESS NOTES
Physical Therapy Daily Treatment Note    Date: 2024  Patient Name: Chucho Morales  : 1955   MRN: [id number]  DOInjury: --  DOSx: 23    Referring Provider:   Jae Ayala DO   [place] [address]         Medical Diagnosis:    Diagnosis Orders   1. S/P left knee arthroscopy            Kang is 4 months post arthroscopic knee surgery. Patient has good motion and no edema. He does demonstrate L knee weakness. Treatment will be a progressive LE strengthening program with the additional of balance exercise as appropriate.        Outcome Measure: Lower Extremity Functional Scale (LEFS) 49% impairment    Precautions/Contraindications: falls risk due to bilateral foot drop worse with fatigue (not wearing AFO's).  All toe's fused -- will compromise calf strengthening, possibly leg strengthening.  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 Also reports bilateral foot drop; diagnosed 2 years ago.       X = TO BE PERFORMED NEXT VISIT  > = PROGRESS TO THIS    S: Patient reports feeling a little worse today from having to take care of someone's house and sick dog while they are away.  O:    Time  2752-4544      Visit  8 + eval Repeat outcome measure at mid point and end.    Pain    1/10     ROM  Good     Modalities          MO   Exercise       Nustep  L5 x 10 min  NR   Step-ups - FWD  7\" x 20 reps Perform FIRST NR   Step-ups - LAT ?  TA   [] TG  [x] Leg Press 2-leg 60# 3 x 15  NR   [] TG  [] Leg Press 1-leg >?  TE   CR  Limited due to toe fusion  TE   Hamstring Curl 50# 3 x 15  NR   Knee Extension Machine 10# 3 x 10  NR   Marching in place 3 x 10     Squats 3 x 10     Hip abduction >     Marching gait 40 ft x 2   NR   Side stepping >  NR               A: Patient tolerated well. Feedback and cues necessary for developing neuromuscular control. Movement education and guided movement interventions such as verbal and tactile cues used to improve

## 2024-02-14 ENCOUNTER — TREATMENT (OUTPATIENT)
Dept: PHYSICAL THERAPY | Age: 69
End: 2024-02-14
Payer: MEDICARE

## 2024-02-14 DIAGNOSIS — Z98.890 S/P LEFT KNEE ARTHROSCOPY: Primary | ICD-10-CM

## 2024-02-14 PROCEDURE — 97112 NEUROMUSCULAR REEDUCATION: CPT

## 2024-02-14 NOTE — PROGRESS NOTES
Physical Therapy Daily Treatment Note    Date: 2024  Patient Name: Chucho Morales  : 1955   MRN: [id number]  DOInjury: --  DOSx: 23    Referring Provider:   Jae Ayala DO   [place] [address]         Medical Diagnosis:    Diagnosis Orders   1. S/P left knee arthroscopy              Kang is 4 months post arthroscopic knee surgery. Patient has good motion and no edema. He does demonstrate L knee weakness. Treatment will be a progressive LE strengthening program with the additional of balance exercise as appropriate.        Outcome Measure: Lower Extremity Functional Scale (LEFS) 49% impairment    Precautions/Contraindications: falls risk due to bilateral foot drop worse with fatigue (not wearing AFO's).  All toe's fused -- will compromise calf strengthening, possibly leg strengthening.  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 Also reports bilateral foot drop; diagnosed 2 years ago.       X = TO BE PERFORMED NEXT VISIT  > = PROGRESS TO THIS    S: Patient reports no changes, leg stiffness improving. He is going to start going to a gym when he is done here.    O:    Time  1977-4977      Visit  9 + eval Repeat outcome measure at mid point and end.    Pain    1/10     ROM  Good     Modalities          MO   Exercise       Nustep  L5 x 10 min  NR   Step-ups - FWD  7\" x 20 reps Perform FIRST NR   Step-ups - LAT ?  TA   [] TG  [x] Leg Press 2-leg 80# 3 x 15  NR   [] TG  [] Leg Press 1-leg >?  TE   CR  Limited due to toe fusion  TE   Hamstring Curl 50# 3 x 12  NR   Knee Extension Machine 10# 3 x 10  NR   Marching in place 3 x 10     Squats 3 x 10     Hip abduction >     Marching gait 40 ft x 2   NR   Side stepping >  NR               A: Patient tolerated well. Feedback and cues necessary for developing neuromuscular control. Movement education and guided movement interventions such as verbal and tactile cues used to improve performance

## 2024-02-19 ENCOUNTER — TREATMENT (OUTPATIENT)
Dept: PHYSICAL THERAPY | Age: 69
End: 2024-02-19
Payer: MEDICARE

## 2024-02-19 DIAGNOSIS — Z98.890 S/P LEFT KNEE ARTHROSCOPY: Primary | ICD-10-CM

## 2024-02-19 PROCEDURE — 97112 NEUROMUSCULAR REEDUCATION: CPT

## 2024-02-19 NOTE — PROGRESS NOTES
Physical Therapy Daily Treatment Note    Date: 2024  Patient Name: Chucho Morales  : 1955   MRN: [id number]  DOInjury: --  DOSx: 23    Referring Provider:   Jae Ayala DO  No address on file         Medical Diagnosis:    Diagnosis Orders   1. S/P left knee arthroscopy              Kang is 4 months post arthroscopic knee surgery. Patient has good motion and no edema. He does demonstrate L knee weakness. Treatment will be a progressive LE strengthening program with the additional of balance exercise as appropriate.        Outcome Measure: Lower Extremity Functional Scale (LEFS) 49% impairment    Precautions/Contraindications: falls risk due to bilateral foot drop worse with fatigue (not wearing AFO's).  All toe's fused -- will compromise calf strengthening, possibly leg strengthening.  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 Also reports bilateral foot drop; diagnosed 2 years ago.       X = TO BE PERFORMED NEXT VISIT  > = PROGRESS TO THIS    S: Patient has been taking care of friends dog and takes it out 4-5 times a day. He has been trying to walk more around his apartment building. Doing okay with all of this.  O:    Time  3937-2114      Visit  10 + eval    Authorization #[id number]  approved 4 visits  [id number], 98562, 66591  2024 through 3/15/2024  Repeat outcome measure at mid point and end.    Pain    1/10     ROM  Good     Modalities          MO   Exercise       Nustep  L5 x 10 min  NR   Step-ups - FWD  7\" x 20 reps Perform FIRST NR   Step-ups - LAT ?  TA   [] TG  [x] Leg Press 2-leg 80# 3 x 15  NR   [] TG  [] Leg Press 1-leg >?  TE   CR  Limited due to toe fusion  TE   Hamstring Curl 50# 3 x 12  NR   Knee Extension Machine 10# 3 x 10  NR   Marching in place 3 x 10     Squats 3 x 10     Hip abduction >     Marching gait 40 ft x 2   NR   Side stepping >  NR               A: Patient tolerated well. Feedback and cues necessary for developing

## 2024-02-21 ENCOUNTER — TREATMENT (OUTPATIENT)
Dept: PHYSICAL THERAPY | Age: 69
End: 2024-02-21
Payer: MEDICARE

## 2024-02-21 DIAGNOSIS — Z98.890 S/P LEFT KNEE ARTHROSCOPY: Primary | ICD-10-CM

## 2024-02-21 PROCEDURE — 97112 NEUROMUSCULAR REEDUCATION: CPT

## 2024-02-21 NOTE — PROGRESS NOTES
Physical Therapy Daily Treatment Note    Date: 2024  Patient Name: Chucho Morales  : 1955   MRN: [id number]  DOInjury: --  DOSx: 23    Referring Provider:   Jae Ayala DO  No address on file         Medical Diagnosis:    Diagnosis Orders   1. S/P left knee arthroscopy            Kang is 4 months post arthroscopic knee surgery. Patient has good motion and no edema. He does demonstrate L knee weakness. Treatment will be a progressive LE strengthening program with the additional of balance exercise as appropriate.        Outcome Measure: Lower Extremity Functional Scale (LEFS) 49% impairment    Precautions/Contraindications: falls risk due to bilateral foot drop worse with fatigue (not wearing AFO's).  All toe's fused -- will compromise calf strengthening, possibly leg strengthening.  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 Also reports bilateral foot drop; diagnosed 2 years ago.       X = TO BE PERFORMED NEXT VISIT  > = PROGRESS TO THIS    S: Patient reports his knee is hurting a little but feels it is from the way he was squatting with exercises.  O:    Time  8848-3125      Visit  11 + eval    Authorization #[id number]  approved 4 visits  [id number], [id number], [id number]  2024 through 3/15/2024  Repeat outcome measure at mid point and end.    Pain    1/10     ROM  Good     Modalities          MO   Exercise       Nustep  L5 x 10 min  NR   Step-ups - FWD  7\" x 20 reps Perform FIRST NR   Step-ups - LAT ?  TA   [] TG  [x] Leg Press 2-leg 80# 3 x 15  NR   [] TG  [] Leg Press 1-leg >?  TE   CR  Limited due to toe fusion  TE   Hamstring Curl 50# 3 x 12  NR   Knee Extension Machine 10# 3 x 10  NR   Marching in place 3 x 10     Squats 3 x 10     Hip abduction >     Marching gait 40 ft x 2   NR   Side stepping >  NR               A: Patient tolerated well. Feedback and cues necessary for developing neuromuscular control. Movement education and guided movement

## 2024-02-23 LAB
MICROORGANISM SPEC CULT: ABNORMAL
SPECIMEN DESCRIPTION: ABNORMAL

## 2024-02-26 ENCOUNTER — TELEPHONE (OUTPATIENT)
Dept: PHYSICAL THERAPY | Age: 69
End: 2024-02-26

## 2024-02-26 NOTE — TELEPHONE ENCOUNTER
2/26/2024  [id number]  Chucho DARNELL Morales      Patient called and canceled appointment.         Ann Kay, PTA

## 2024-03-11 ENCOUNTER — TELEPHONE (OUTPATIENT)
Dept: PRIMARY CARE CLINIC | Age: 69
End: 2024-03-11

## 2024-03-11 DIAGNOSIS — J44.9 COPD WITHOUT EXACERBATION (HCC): Primary | Chronic | ICD-10-CM

## 2024-03-26 ENCOUNTER — TELEPHONE (OUTPATIENT)
Dept: CASE MANAGEMENT | Age: 69
End: 2024-03-26

## 2024-03-26 NOTE — TELEPHONE ENCOUNTER
No call, encounter opened to process CT Lung Screening.    CT Lung Screen: 3/25/2024    IMPRESSION:  No suspicious pulmonary nodule.     LUNG RADS:  Lung-RADS 1 - Negative (v2022)     Management:  12 month screening LDCT     RECOMMENDATIONS:  If you would like to register your patient with the [facility] Lung Nodule/Lung  Cancer Screening Program, please contact the Nurse Navigator at  [phone].    Pack years: 40    Social History     Tobacco Use  Smoking Status: Former Smoker    Start Date: 1979   Quit Date: 2019   Types: Cigarettes   Packs/Day: 1   Years: 40   Pack Years: 40   Smokeless Tobacco: Never         Results letter sent to patient via my chart or mailed.     Vini Tolliver  Imaging Navigator   [facility]   [phone]

## 2024-04-02 DIAGNOSIS — I10 PRIMARY HYPERTENSION: ICD-10-CM

## 2024-04-02 DIAGNOSIS — E78.2 MIXED HYPERLIPIDEMIA: ICD-10-CM

## 2024-04-02 DIAGNOSIS — N18.2 CKD (CHRONIC KIDNEY DISEASE) STAGE 2, GFR 60-89 ML/MIN: Chronic | ICD-10-CM

## 2024-04-02 DIAGNOSIS — E55.9 VITAMIN D DEFICIENCY: ICD-10-CM

## 2024-04-02 LAB
ALBUMIN SERPL-MCNC: 4.3 G/DL (ref 3.5–5.2)
ALP BLD-CCNC: 65 U/L (ref 40–129)
ALT SERPL-CCNC: 13 U/L (ref 0–40)
ANION GAP SERPL CALCULATED.3IONS-SCNC: 12 MMOL/L (ref 7–16)
AST SERPL-CCNC: 17 U/L (ref 0–39)
BILIRUB SERPL-MCNC: 0.9 MG/DL (ref 0–1.2)
BUN BLDV-MCNC: 16 MG/DL (ref 6–23)
CALCIUM SERPL-MCNC: 9.4 MG/DL (ref 8.6–10.2)
CHLORIDE BLD-SCNC: 104 MMOL/L (ref 98–107)
CHOLESTEROL: 202 MG/DL
CO2: 23 MMOL/L (ref 22–29)
CREAT SERPL-MCNC: 1.1 MG/DL (ref 0.7–1.2)
GFR SERPL CREATININE-BSD FRML MDRD: 69 ML/MIN/1.73M2
GLUCOSE BLD-MCNC: 94 MG/DL (ref 74–99)
HDLC SERPL-MCNC: 37 MG/DL
LDL CHOLESTEROL: 145 MG/DL
POTASSIUM SERPL-SCNC: 4.7 MMOL/L (ref 3.5–5)
SODIUM BLD-SCNC: 139 MMOL/L (ref 132–146)
TOTAL PROTEIN: 7.6 G/DL (ref 6.4–8.3)
TRIGL SERPL-MCNC: 102 MG/DL
VLDLC SERPL CALC-MCNC: 20 MG/DL

## 2024-04-04 LAB — VITAMIN D 25-HYDROXY: 22.9 NG/ML (ref 30–100)

## 2024-04-06 SDOH — ECONOMIC STABILITY: FOOD INSECURITY: WITHIN THE PAST 12 MONTHS, YOU WORRIED THAT YOUR FOOD WOULD RUN OUT BEFORE YOU GOT MONEY TO BUY MORE.: NEVER TRUE

## 2024-04-06 SDOH — HEALTH STABILITY: PHYSICAL HEALTH: ON AVERAGE, HOW MANY MINUTES DO YOU ENGAGE IN EXERCISE AT THIS LEVEL?: 10 MIN

## 2024-04-06 SDOH — ECONOMIC STABILITY: INCOME INSECURITY: HOW HARD IS IT FOR YOU TO PAY FOR THE VERY BASICS LIKE FOOD, HOUSING, MEDICAL CARE, AND HEATING?: NOT HARD AT ALL

## 2024-04-06 SDOH — ECONOMIC STABILITY: FOOD INSECURITY: WITHIN THE PAST 12 MONTHS, THE FOOD YOU BOUGHT JUST DIDN'T LAST AND YOU DIDN'T HAVE MONEY TO GET MORE.: NEVER TRUE

## 2024-04-06 SDOH — HEALTH STABILITY: PHYSICAL HEALTH: ON AVERAGE, HOW MANY DAYS PER WEEK DO YOU ENGAGE IN MODERATE TO STRENUOUS EXERCISE (LIKE A BRISK WALK)?: 3 DAYS

## 2024-04-06 SDOH — ECONOMIC STABILITY: TRANSPORTATION INSECURITY
IN THE PAST 12 MONTHS, HAS LACK OF TRANSPORTATION KEPT YOU FROM MEETINGS, WORK, OR FROM GETTING THINGS NEEDED FOR DAILY LIVING?: NO

## 2024-04-06 ASSESSMENT — PATIENT HEALTH QUESTIONNAIRE - PHQ9
1. LITTLE INTEREST OR PLEASURE IN DOING THINGS: SEVERAL DAYS
SUM OF ALL RESPONSES TO PHQ QUESTIONS 1-9: 1
SUM OF ALL RESPONSES TO PHQ9 QUESTIONS 1 & 2: 1
2. FEELING DOWN, DEPRESSED OR HOPELESS: NOT AT ALL
SUM OF ALL RESPONSES TO PHQ QUESTIONS 1-9: 1

## 2024-04-06 ASSESSMENT — LIFESTYLE VARIABLES
HOW MANY STANDARD DRINKS CONTAINING ALCOHOL DO YOU HAVE ON A TYPICAL DAY: PATIENT DOES NOT DRINK
HOW OFTEN DO YOU HAVE A DRINK CONTAINING ALCOHOL: 1
HOW OFTEN DO YOU HAVE A DRINK CONTAINING ALCOHOL: NEVER
HOW MANY STANDARD DRINKS CONTAINING ALCOHOL DO YOU HAVE ON A TYPICAL DAY: 0
HOW OFTEN DO YOU HAVE SIX OR MORE DRINKS ON ONE OCCASION: 1

## 2024-04-09 ENCOUNTER — OFFICE VISIT (OUTPATIENT)
Dept: PRIMARY CARE CLINIC | Age: 69
End: 2024-04-09
Payer: MEDICARE

## 2024-04-09 VITALS
BODY MASS INDEX: 33.67 KG/M2 | TEMPERATURE: 97 F | HEART RATE: 80 BPM | SYSTOLIC BLOOD PRESSURE: 124 MMHG | HEIGHT: 78 IN | OXYGEN SATURATION: 92 % | DIASTOLIC BLOOD PRESSURE: 82 MMHG | WEIGHT: 291 LBS

## 2024-04-09 DIAGNOSIS — Z79.01 CHRONIC ANTICOAGULATION: ICD-10-CM

## 2024-04-09 DIAGNOSIS — N40.0 BENIGN PROSTATIC HYPERPLASIA WITHOUT LOWER URINARY TRACT SYMPTOMS: ICD-10-CM

## 2024-04-09 DIAGNOSIS — N18.2 CKD (CHRONIC KIDNEY DISEASE) STAGE 2, GFR 60-89 ML/MIN: Chronic | ICD-10-CM

## 2024-04-09 DIAGNOSIS — Z00.00 WELCOME TO MEDICARE PREVENTIVE VISIT: Primary | ICD-10-CM

## 2024-04-09 DIAGNOSIS — I10 PRIMARY HYPERTENSION: ICD-10-CM

## 2024-04-09 DIAGNOSIS — E78.2 MIXED HYPERLIPIDEMIA: ICD-10-CM

## 2024-04-09 DIAGNOSIS — J44.9 COPD WITHOUT EXACERBATION (HCC): Chronic | ICD-10-CM

## 2024-04-09 DIAGNOSIS — Z86.73 HISTORY OF CVA (CEREBROVASCULAR ACCIDENT): ICD-10-CM

## 2024-04-09 DIAGNOSIS — E55.9 VITAMIN D DEFICIENCY: ICD-10-CM

## 2024-04-09 PROCEDURE — G8427 DOCREV CUR MEDS BY ELIG CLIN: HCPCS | Performed by: INTERNAL MEDICINE

## 2024-04-09 PROCEDURE — 1123F ACP DISCUSS/DSCN MKR DOCD: CPT | Performed by: INTERNAL MEDICINE

## 2024-04-09 PROCEDURE — 3074F SYST BP LT 130 MM HG: CPT | Performed by: INTERNAL MEDICINE

## 2024-04-09 PROCEDURE — G0402 INITIAL PREVENTIVE EXAM: HCPCS | Performed by: INTERNAL MEDICINE

## 2024-04-09 PROCEDURE — G8417 CALC BMI ABV UP PARAM F/U: HCPCS | Performed by: INTERNAL MEDICINE

## 2024-04-09 PROCEDURE — 3023F SPIROM DOC REV: CPT | Performed by: INTERNAL MEDICINE

## 2024-04-09 PROCEDURE — 99213 OFFICE O/P EST LOW 20 MIN: CPT | Performed by: INTERNAL MEDICINE

## 2024-04-09 PROCEDURE — 3079F DIAST BP 80-89 MM HG: CPT | Performed by: INTERNAL MEDICINE

## 2024-04-09 PROCEDURE — 3017F COLORECTAL CA SCREEN DOC REV: CPT | Performed by: INTERNAL MEDICINE

## 2024-04-09 PROCEDURE — 1036F TOBACCO NON-USER: CPT | Performed by: INTERNAL MEDICINE

## 2024-04-09 RX ORDER — CLOTRIMAZOLE AND BETAMETHASONE DIPROPIONATE 10; .64 MG/G; MG/G
1 CREAM TOPICAL 2 TIMES DAILY
COMMUNITY
Start: 2024-03-29

## 2024-04-09 RX ORDER — LISINOPRIL 20 MG/1
20 TABLET ORAL DAILY
Qty: 90 TABLET | Refills: 1 | Status: SHIPPED | OUTPATIENT
Start: 2024-04-09

## 2024-04-09 RX ORDER — MULTIVIT-MIN/IRON/FOLIC ACID/K 18-600-40
2000 CAPSULE ORAL DAILY
Qty: 90 CAPSULE | Refills: 3 | Status: SHIPPED | OUTPATIENT
Start: 2024-04-09

## 2024-04-09 NOTE — PROGRESS NOTES
Visit Medications    Medication Sig Taking? Authorizing Provider   clotrimazole-betamethasone (LOTRISONE) 1-0.05 % cream Apply 1 each topically 2 times daily Yes La Duran MD   lisinopril (PRINIVIL;ZESTRIL) 20 MG tablet Take 1 tablet by mouth daily Yes Megan Lawton MD   Vitamin D, Cholecalciferol, 50 MCG (2000 UT) CAPS Take 2,000 Units by mouth daily Yes Megan Lawton MD   azelastine (ASTELIN) 0.1 % nasal spray 2 sprays by Nasal route 2 times daily Use in each nostril as directed Yes Thomas Bruce, DO   triamcinolone (KENALOG) 0.1 % ointment Apply topically 2 times daily for 7 days Yes Thomas Bruce, DO   albuterol sulfate HFA (PROVENTIL;VENTOLIN;PROAIR) 108 (90 Base) MCG/ACT inhaler INHALE 1 OR 2 PUFFS BY MOUTH FOUR TIMES A DAY AS NEEDED FOR BRONCHOSPASM PREVENTION Yes La Duran MD   hydrocortisone 2.5 % cream Apply topically 2 times daily as needed Apply topically 2 times daily. Yes La Duran MD   loratadine (CLARITIN) 10 MG capsule Take 1 capsule by mouth daily Yes ProviderLa MD   ZEASORB-AF 2 % powder APPLY TO GROIN UP TO TWICE DAILY AS NEEDED Yes La Duran MD   methocarbamol (ROBAXIN) 500 MG tablet Take 2 tablets by mouth as needed Yes La Duran MD   acetaminophen (TYLENOL) 325 MG tablet Take 2 tablets by mouth every 6 hours as needed Yes La Duran MD   carboxymethylcellulose (REFRESH PLUS) 0.5 % SOLN ophthalmic solution Apply 1 drop to eye as needed Yes La Duran MD   terbinafine (LAMISIL) 1 % cream Apply topically Yes La Duran MD   apixaban (ELIQUIS) 5 MG TABS tablet Take 0.5 tablets by mouth 2 times daily Yes La Duran MD   sodium chloride (OCEAN, BABY AYR) 0.65 % nasal spray 2 sprays by Nasal route as needed for Congestion (5 times daily) Yes La Duran MD   omeprazole (PRILOSEC) 20 MG delayed release capsule Take 1 capsule by mouth at bedtime Yes Renee

## 2024-04-15 ENCOUNTER — HOSPITAL ENCOUNTER (EMERGENCY)
Age: 69
Discharge: HOME | End: 2024-04-15
Payer: MEDICARE

## 2024-04-15 VITALS
RESPIRATION RATE: 18 BRPM | OXYGEN SATURATION: 94 % | TEMPERATURE: 97.7 F | HEART RATE: 62 BPM | SYSTOLIC BLOOD PRESSURE: 153 MMHG | DIASTOLIC BLOOD PRESSURE: 97 MMHG

## 2024-04-15 DIAGNOSIS — J06.9 ACUTE UPPER RESPIRATORY INFECTION: Primary | ICD-10-CM

## 2024-04-15 LAB
INFLUENZA A BY PCR: NOT DETECTED
INFLUENZA B BY PCR: NOT DETECTED
SARS-COV-2 RDRP RESP QL NAA+PROBE: NOT DETECTED
SPECIMEN DESCRIPTION: NORMAL

## 2024-04-15 PROCEDURE — 87502 INFLUENZA DNA AMP PROBE: CPT

## 2024-04-15 PROCEDURE — 87635 SARS-COV-2 COVID-19 AMP PRB: CPT

## 2024-04-15 PROCEDURE — 99211 OFF/OP EST MAY X REQ PHY/QHP: CPT

## 2024-04-15 RX ORDER — DOXYCYCLINE HYCLATE 100 MG
100 TABLET ORAL 2 TIMES DAILY
Qty: 20 TABLET | Refills: 0 | Status: SHIPPED | OUTPATIENT
Start: 2024-04-15 | End: 2024-04-25

## 2024-04-15 RX ORDER — METHYLPREDNISOLONE 4 MG/1
TABLET ORAL
Qty: 1 KIT | Refills: 0 | Status: SHIPPED | OUTPATIENT
Start: 2024-04-15 | End: 2024-04-21

## 2024-04-15 NOTE — ED PROVIDER NOTES
does not use drugs.    Family History: family history includes Breast Cancer in his mother; Colon Cancer in his mother; Diabetes in his father; Heart Disease in his father; Lung Cancer in his brother; Stroke in his father.     Allergies: Cat hair extract, Other, Seasonal, and Ultram [tramadol hcl]    Physical Exam   Oxygen Saturation Interpretation: Normal on room air analysis.        ED Triage Vitals [04/15/24 1035]   BP Temp Temp src Pulse Respirations SpO2 Height Weight   (!) 153/97 97.7 °F (36.5 °C) -- 62 18 94 % -- --         Constitutional:  Alert, development consistent with age.  Ears:  External Ears: Bilateral normal.               TM's & External Canals: normal TM's and external ear canals bilaterally.  Nose:   There is clear rhinorrhea.  Sinuses: no Bilateral maxillary sinus tenderness.                    no Bilateral frontal sinus tenderness.  Mouth:  normal tongue and buccal mucosa.   Throat: no erythema or exudates noted. Teeth and gums normal..  Airway Patent.  Neck:  Supple. No meningeal signs. There is no  preauricular, anterior cervical, and posterior cervical node tenderness.  Respiratory:   Breath sounds: Bilateral present bilaterally, diminished.  CV:  Regular rate and rhythm, normal heart sounds, without pathological murmurs, ectopy, gallops, or rubs.  GI:  Abdomen Soft, nontender, good bowel sounds.  No firm or pulsatile mass.  Integument:  Normal turgor.  Warm, dry, without visible rash.  Neurological:  Oriented.  Motor functions intact.       Lab / Imaging Results   (All laboratory and radiology results have been personally reviewed by myself)  Labs:  Results for orders placed or performed during the hospital encounter of 04/15/24   COVID-19, Rapid    Specimen: Nasopharyngeal Swab   Result Value Ref Range    Specimen Description .NASOPHARYNGEAL SWAB     SARS-CoV-2, Rapid Not Detected Not Detected   Influenza A+B, PCR    Specimen: Nasal   Result Value Ref Range    Influenza A by PCR Not

## 2024-04-24 ENCOUNTER — HOSPITAL ENCOUNTER (EMERGENCY)
Age: 69
Discharge: HOME | End: 2024-04-24
Payer: MEDICARE

## 2024-04-24 VITALS
SYSTOLIC BLOOD PRESSURE: 163 MMHG | TEMPERATURE: 97.3 F | HEART RATE: 70 BPM | RESPIRATION RATE: 18 BRPM | DIASTOLIC BLOOD PRESSURE: 89 MMHG | BODY MASS INDEX: 33.86 KG/M2 | WEIGHT: 293 LBS | OXYGEN SATURATION: 97 %
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
## 2024-04-24 PROCEDURE — 99211 OFF/OP EST MAY X REQ PHY/QHP: CPT

## 2024-04-24 RX ORDER — CAMPHOR 0.45 %
GEL (GRAM) TOPICAL
Qty: 1 EACH | Refills: 0 | Status: SHIPPED | OUTPATIENT
Start: 2024-04-24

## 2024-04-24 RX ORDER — METHYLPREDNISOLONE 4 MG/1
TABLET ORAL
Qty: 21 TABLET | Refills: 0 | Status: SHIPPED | OUTPATIENT
Start: 2024-04-24

## 2024-04-24 ASSESSMENT — PAIN SCALES - GENERAL: PAINLEVEL_OUTOF10: 7

## 2024-04-24 ASSESSMENT — PAIN - FUNCTIONAL ASSESSMENT: PAIN_FUNCTIONAL_ASSESSMENT: 0-10

## 2024-04-24 NOTE — ED PROVIDER NOTES
Department of Emergency Medicine  [facility]  Provider Note  Admit Date/Time: 2024 11:00 AM  Room:   NAME: Chucho Morales  : 1955  MRN: [id number]     Chief Complaint:  Rash (Rash on back thinks it  maybe shingles  started 2-3 days ago getting worse)    History of Present Illness        Chucho Morales is a 68 y.o. male who has a past medical history of:   Past Medical History:   Diagnosis Date    Arthritis     Cervical (neck) region somatic dysfunction 2022    Cervicogenic headache 2022    Chronic kidney disease     COPD (chronic obstructive pulmonary disease) ([facility])     DVT of lower extremity (deep venous thrombosis) ([facility])     post foot surgery      GERD (gastroesophageal reflux disease)     HIV (human immunodeficiency virus infection) ([facility])     Hx of blood clots     Hyperlipidemia     Hypertension     Muscle contraction headache 2022    Pituitary tumor     surgically removed 80%, being monitored    Prostate enlargement     Urinary retention 2019    presents to the urgent care center by private car for evaluation.  He is got a rash on his back and he also noticed some on his right leg and his left forearm.  It has been going on for 2 or 3 days.  He thinks that it is  shingles so he came in for evaluation.    ROS    Pertinent positives and negatives are stated within HPI, all other systems reviewed and are negative.    Past Surgical History:   Procedure Laterality Date    ABDOMEN SURGERY  1970    colon resection d/t paralytic ileus from blunt trauma to abdomen     ANKLE SURGERY Right     APPENDECTOMY      BRAIN SURGERY  2018    Dr. Acevedo , pituary tumor resection    COLONOSCOPY      CYSTOSCOPY N/A 2023    CYSTOURETHROSCOPY WITH INSERTION OF PERMANENT ADJUSTABLE TRANSPROSTATIC IMPLANT performed by James Pavon MD at [facility] OR    FOOT SURGERY Bilateral     hammertoe    HAMMER TOE SURGERY Left 2019    CONDYLECTOMY 4TH TOE

## 2024-04-26 ENCOUNTER — TELEPHONE (OUTPATIENT)
Dept: PRIMARY CARE CLINIC | Age: 69
End: 2024-04-26

## 2024-04-26 ENCOUNTER — HOSPITAL ENCOUNTER (EMERGENCY)
Age: 69
Discharge: HOME | End: 2024-04-26
Attending: STUDENT IN AN ORGANIZED HEALTH CARE EDUCATION/TRAINING PROGRAM
Payer: MEDICARE

## 2024-04-26 VITALS
TEMPERATURE: 98.1 F | DIASTOLIC BLOOD PRESSURE: 98 MMHG | SYSTOLIC BLOOD PRESSURE: 188 MMHG | RESPIRATION RATE: 16 BRPM | OXYGEN SATURATION: 96 % | HEART RATE: 95 BPM
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
## 2024-04-26 PROCEDURE — 96372 THER/PROPH/DIAG INJ SC/IM: CPT

## 2024-04-26 PROCEDURE — 6360000002 HC RX W HCPCS: Performed by: PHYSICIAN ASSISTANT

## 2024-04-26 PROCEDURE — 99284 EMERGENCY DEPT VISIT MOD MDM: CPT

## 2024-04-26 RX ORDER — TRIAMCINOLONE ACETONIDE 40 MG/ML
40 INJECTION, SUSPENSION INTRA-ARTICULAR; INTRAMUSCULAR ONCE
Status: COMPLETED | OUTPATIENT
Start: 2024-04-26 | End: 2024-04-26

## 2024-04-26 RX ORDER — PREDNISONE 10 MG/1
TABLET ORAL
Qty: 30 TABLET | Refills: 0 | Status: SHIPPED | OUTPATIENT
Start: 2024-04-26 | End: 2024-05-06

## 2024-04-26 RX ADMIN — TRIAMCINOLONE ACETONIDE 40 MG: 40 INJECTION, SUSPENSION INTRA-ARTICULAR; INTRAMUSCULAR at 21:13

## 2024-04-26 NOTE — TELEPHONE ENCOUNTER
Patient has bed bugs. He needs something to help with the itching from the bites. Pharm [facility] [address]

## 2024-04-27 NOTE — DISCHARGE INSTR - COC
Continuity of Care Form    Patient Name: Chucho Morales   :  1955  MRN:  [id number]    Admit date:  2024  Discharge date:  ***    Code Status Order: Prior   Advance Directives:     Admitting Physician:  No admitting provider for patient encounter.  PCP: Megan Lawton MD    Discharging Nurse: ***  Discharging Hospital Unit/Room#:   Discharging Unit Phone Number: ***    Emergency Contact:   Extended Emergency Contact Information  Primary Emergency Contact: Mirian Bashir           [facility]  Home Phone: [phone]  Mobile Phone: [phone]  Relation: Brother/Sister  Preferred language: English   needed? No    Past Surgical History:  Past Surgical History:   Procedure Laterality Date    ABDOMEN SURGERY  1970    colon resection d/t paralytic ileus from blunt trauma to abdomen     ANKLE SURGERY Right     APPENDECTOMY      BRAIN SURGERY      Dr. Cutris ESPINOZA, pituary tumor resection    COLONOSCOPY      CYSTOSCOPY N/A 2023    CYSTOURETHROSCOPY WITH INSERTION OF PERMANENT ADJUSTABLE TRANSPROSTATIC IMPLANT performed by James Pavon MD at [facility] OR    FOOT SURGERY Bilateral     hammertoe    HAMMER TOE SURGERY Left 2019    CONDYLECTOMY 4TH TOE HAMMERTOE CORRECTION  5TH TOE LEFT FOOT. V TO Y SKIN PLASTY 5TH METATARSAL PHALANGEAL JOINT LEFT FOOT performed by Damien North Jr., DPM at [facility] OR    HEEL SPUR SURGERY      HERNIA REPAIR      KNEE ARTHROSCOPY Left 2023    LEFT KNEE ARTHROSCOPY MENISECTOMY AND DEBRIDEMENT CHONDROPLASTY performed by Jae Ayala DO at [facility] OR    LYMPHADENECTOMY Left     left axilla & Rt side neck    OTHER SURGICAL HISTORY Left 2019    hammertoe correction 4th and 5th toes left foot, skin plasty 5th MPJ left foot       Immunization History:   Immunization History   Administered Date(s) Administered    COVID-19, Non-[place] Vaccine, Vaccine Unspecified 2021, 2021    COVID-19, PFIZER Bivalent, DO

## 2024-04-27 NOTE — ED PROVIDER NOTES
evident on upper legs bilaterally, upper arms bilaterally, and diffusely along the back.  No vesicles or pustules noted.  There is no crepitus noted to any of these areas.  Neurologic: GCS 15,  Psych: Normal Affect      ------------------------------ ED COURSE/MEDICAL DECISION MAKING----------------------  Medications   triamcinolone acetonide (KENALOG-40) injection 40 mg (40 mg IntraMUSCular Given 4/26/24 2113)         ED COURSE:       Medical Decision Making:    Patient is a 68-year-old male presenting to the emergency department with a rash consistent with insect bites.  He is overall nontoxic-appearing, afebrile no acute distress.  At this time we will plan on outpatient management with prednisone.  He was also given 40 mg of IM Kenalog in the emergency department today.  Advised follow very closely with PCP for recheck return to the ER with any new or worsening symptoms.  Patient voiced understanding and is agreeable to the above treatment plan.    Counseling:   The emergency provider has spoken with the patient and discussed today’s results, in addition to providing specific details for the plan of care and counseling regarding the diagnosis and prognosis.  Questions are answered at this time and they are agreeable with the plan.      --------------------------------- IMPRESSION AND DISPOSITION ---------------------------------    IMPRESSION  1. Insect bite, unspecified site, initial encounter        DISPOSITION  Disposition: Discharge to home  Patient condition is stable      NOTE: This report was transcribed using voice recognition software. Every effort was made to ensure accuracy; however, inadvertent computerized transcription errors may be present        Maura Cantu PA  04/27/24 0045

## 2024-04-30 RX ORDER — PERMETHRIN 50 MG/G
CREAM TOPICAL
Qty: 60 G | Refills: 1 | Status: SHIPPED | OUTPATIENT
Start: 2024-04-30

## 2024-04-30 NOTE — TELEPHONE ENCOUNTER
Left pt a VM regarding a prescription being called into the pharmacy and also advised pt to have his home sprayed or an  called.

## 2024-07-12 ENCOUNTER — OFFICE VISIT (OUTPATIENT)
Dept: ENT CLINIC | Age: 69
End: 2024-07-12
Payer: MEDICARE

## 2024-07-12 VITALS
BODY MASS INDEX: 35.29 KG/M2 | HEIGHT: 78 IN | HEART RATE: 77 BPM | SYSTOLIC BLOOD PRESSURE: 135 MMHG | DIASTOLIC BLOOD PRESSURE: 83 MMHG | WEIGHT: 305 LBS

## 2024-07-12 DIAGNOSIS — J30.9 ALLERGIC RHINITIS, UNSPECIFIED SEASONALITY, UNSPECIFIED TRIGGER: Primary | ICD-10-CM

## 2024-07-12 DIAGNOSIS — J34.2 DNS (DEVIATED NASAL SEPTUM): ICD-10-CM

## 2024-07-12 PROCEDURE — 3079F DIAST BP 80-89 MM HG: CPT | Performed by: OTOLARYNGOLOGY

## 2024-07-12 PROCEDURE — 3017F COLORECTAL CA SCREEN DOC REV: CPT | Performed by: OTOLARYNGOLOGY

## 2024-07-12 PROCEDURE — 3075F SYST BP GE 130 - 139MM HG: CPT | Performed by: OTOLARYNGOLOGY

## 2024-07-12 PROCEDURE — 1036F TOBACCO NON-USER: CPT | Performed by: OTOLARYNGOLOGY

## 2024-07-12 PROCEDURE — 99213 OFFICE O/P EST LOW 20 MIN: CPT | Performed by: OTOLARYNGOLOGY

## 2024-07-12 PROCEDURE — G8417 CALC BMI ABV UP PARAM F/U: HCPCS | Performed by: OTOLARYNGOLOGY

## 2024-07-12 PROCEDURE — G8427 DOCREV CUR MEDS BY ELIG CLIN: HCPCS | Performed by: OTOLARYNGOLOGY

## 2024-07-12 PROCEDURE — 1123F ACP DISCUSS/DSCN MKR DOCD: CPT | Performed by: OTOLARYNGOLOGY

## 2024-07-18 NOTE — PROGRESS NOTES
Exam  Vitals and nursing note reviewed.   Constitutional:       Appearance: He is well-developed.   HENT:      Head: Normocephalic and atraumatic. No masses or laceration.      Right Ear: Tympanic membrane and ear canal normal. No middle ear effusion. There is no impacted cerumen.      Left Ear: Tympanic membrane and ear canal normal.  No middle ear effusion. There is no impacted cerumen.      Nose: No congestion or rhinorrhea.      Mouth/Throat:      Mouth: Mucous membranes are moist. No lacerations or oral lesions.      Dentition: No gum lesions.      Pharynx: No oropharyngeal exudate or posterior oropharyngeal erythema.   Eyes:      Pupils: Pupils are equal, round, and reactive to light.   Neck:      Thyroid: No thyromegaly.      Trachea: No tracheal deviation.   Cardiovascular:      Rate and Rhythm: Normal rate.   Pulmonary:      Effort: Pulmonary effort is normal. No respiratory distress.   Musculoskeletal:         General: Normal range of motion.      Cervical back: Normal range of motion.   Lymphadenopathy:      Cervical: No cervical adenopathy.   Skin:     General: Skin is warm.      Findings: No erythema.   Neurological:      Mental Status: He is alert.      Cranial Nerves: No cranial nerve deficit.         IMPRESSION/PLAN:  1. Allergic rhinitis, unspecified seasonality, unspecified trigger  2. DNS (deviated nasal septum)    Continue nose spray up to twice a day  FU 1 year     Dr. Curt Mcclendon D.O. Ms. Ed.  Otolaryngology Facial Plastic Surgery  :  [facility] Ot[facility]  Associate Clinical Professor:  JAY[employer]  [facility]          Chucho Morales  1955      I have discussed the case, including pertinent history and exam findings with the resident. I have seen and examined the patient and the key elements of the encounter have been performed by me.  I agree with the assessment, plan and orders as documented by the resident.      Patient here for

## 2024-07-24 LAB
MICROORGANISM SPEC CULT: ABNORMAL
MICROORGANISM SPEC CULT: ABNORMAL
SPECIMEN DESCRIPTION: ABNORMAL

## 2024-10-01 ENCOUNTER — TELEPHONE (OUTPATIENT)
Dept: PRIMARY CARE CLINIC | Age: 69
End: 2024-10-01

## 2024-10-01 NOTE — TELEPHONE ENCOUNTER
Patient needs a letter for a handicap placard. Please call patient when it is ready to be picked up

## 2024-10-02 DIAGNOSIS — N18.2 CKD (CHRONIC KIDNEY DISEASE) STAGE 2, GFR 60-89 ML/MIN: Chronic | ICD-10-CM

## 2024-10-02 DIAGNOSIS — E78.2 MIXED HYPERLIPIDEMIA: ICD-10-CM

## 2024-10-02 LAB
ALBUMIN: 4.4 G/DL (ref 3.5–5.2)
ALP BLD-CCNC: 68 U/L (ref 40–129)
ALT SERPL-CCNC: 15 U/L (ref 0–40)
ANION GAP SERPL CALCULATED.3IONS-SCNC: 12 MMOL/L (ref 7–16)
AST SERPL-CCNC: 18 U/L (ref 0–39)
BILIRUB SERPL-MCNC: 1 MG/DL (ref 0–1.2)
BILIRUBIN, URINE: NEGATIVE
BUN BLDV-MCNC: 16 MG/DL (ref 6–23)
CALCIUM SERPL-MCNC: 9.7 MG/DL (ref 8.6–10.2)
CHLORIDE BLD-SCNC: 104 MMOL/L (ref 98–107)
CHOLESTEROL, TOTAL: 206 MG/DL
CO2: 25 MMOL/L (ref 22–29)
COLOR, UA: YELLOW
COMMENT: NORMAL
CREAT SERPL-MCNC: 1.3 MG/DL (ref 0.7–1.2)
GFR, ESTIMATED: 58 ML/MIN/1.73M2
GLUCOSE BLD-MCNC: 112 MG/DL (ref 74–99)
GLUCOSE URINE: NEGATIVE MG/DL
HDLC SERPL-MCNC: 37 MG/DL
KETONES, URINE: NEGATIVE MG/DL
LDL CHOLESTEROL: 148 MG/DL
LEUKOCYTE ESTERASE, URINE: NEGATIVE
NITRITE, URINE: NEGATIVE
PH, URINE: 6 (ref 5–9)
POTASSIUM SERPL-SCNC: 4.8 MMOL/L (ref 3.5–5)
PROTEIN UA: NEGATIVE MG/DL
PSA SERPL-MCNC: 5.88 NG/ML (ref 0–4)
SODIUM BLD-SCNC: 141 MMOL/L (ref 132–146)
SPECIFIC GRAVITY UA: 1.01 (ref 1–1.03)
TOTAL PROTEIN: 7.3 G/DL (ref 6.4–8.3)
TRIGL SERPL-MCNC: 105 MG/DL
TURBIDITY: CLEAR
URINE HGB: NEGATIVE
UROBILINOGEN, URINE: 0.2 EU/DL (ref 0–1)
VLDLC SERPL CALC-MCNC: 21 MG/DL

## 2024-10-09 ENCOUNTER — OFFICE VISIT (OUTPATIENT)
Dept: PRIMARY CARE CLINIC | Age: 69
End: 2024-10-09

## 2024-10-09 VITALS
WEIGHT: 303.2 LBS | SYSTOLIC BLOOD PRESSURE: 140 MMHG | BODY MASS INDEX: 35.08 KG/M2 | HEIGHT: 78 IN | TEMPERATURE: 97.5 F | OXYGEN SATURATION: 93 % | DIASTOLIC BLOOD PRESSURE: 80 MMHG | HEART RATE: 70 BPM

## 2024-10-09 DIAGNOSIS — I10 PRIMARY HYPERTENSION: ICD-10-CM

## 2024-10-09 DIAGNOSIS — N18.2 CKD (CHRONIC KIDNEY DISEASE) STAGE 2, GFR 60-89 ML/MIN: Chronic | ICD-10-CM

## 2024-10-09 DIAGNOSIS — D35.2 PITUITARY ADENOMA (HCC): ICD-10-CM

## 2024-10-09 DIAGNOSIS — I82.5Y9 CHRONIC DEEP VEIN THROMBOSIS (DVT) OF PROXIMAL VEIN OF LOWER EXTREMITY, UNSPECIFIED LATERALITY (HCC): ICD-10-CM

## 2024-10-09 DIAGNOSIS — N40.0 BENIGN PROSTATIC HYPERPLASIA WITHOUT LOWER URINARY TRACT SYMPTOMS: ICD-10-CM

## 2024-10-09 DIAGNOSIS — Z86.73 HISTORY OF CVA (CEREBROVASCULAR ACCIDENT): ICD-10-CM

## 2024-10-09 DIAGNOSIS — Z86.718 HISTORY OF RECURRENT DEEP VEIN THROMBOSIS (DVT): ICD-10-CM

## 2024-10-09 DIAGNOSIS — J44.9 COPD WITHOUT EXACERBATION (HCC): Chronic | ICD-10-CM

## 2024-10-09 DIAGNOSIS — Z79.01 CHRONIC ANTICOAGULATION: ICD-10-CM

## 2024-10-09 DIAGNOSIS — N18.31 STAGE 3A CHRONIC KIDNEY DISEASE (HCC): Primary | ICD-10-CM

## 2024-10-09 DIAGNOSIS — E23.6 PITUITARY MASS (HCC): ICD-10-CM

## 2024-10-09 DIAGNOSIS — E22.0 ACROMEGALY (HCC): ICD-10-CM

## 2024-10-09 DIAGNOSIS — Z21 HIV INFECTION, UNSPECIFIED SYMPTOM STATUS (HCC): ICD-10-CM

## 2024-10-09 RX ORDER — LISINOPRIL 20 MG/1
20 TABLET ORAL DAILY
Qty: 90 TABLET | Refills: 1 | Status: SHIPPED | OUTPATIENT
Start: 2024-10-09

## 2024-10-09 NOTE — PROGRESS NOTES
month follow-up.    Diagnoses and all orders for this visit:    Stage 3a chronic kidney disease (HCC)    Primary hypertension  -     lisinopril (PRINIVIL;ZESTRIL) 20 MG tablet; Take 1 tablet by mouth daily    CKD (chronic kidney disease) stage 2, GFR 60-89 ml/min  -     lisinopril (PRINIVIL;ZESTRIL) 20 MG tablet; Take 1 tablet by mouth daily  -     Ambulatory Referral to Care Management    Benign prostatic hyperplasia without lower urinary tract symptoms    History of CVA (cerebrovascular accident)  -     Ambulatory Referral to Care Management    History of recurrent deep vein thrombosis (DVT)    Chronic anticoagulation  -     Ambulatory Referral to Care Management    COPD without exacerbation (HCC)  -     Ambulatory Referral to Care Management    HIV infection, unspecified symptom status (HCC)  -     Ambulatory Referral to Care Management    Acromegaly (HCC)    Pituitary adenoma (HCC)    Chronic deep vein thrombosis (DVT) of proximal vein of lower extremity, unspecified laterality (HCC)    Pituitary mass (HCC)         There are no Patient Instructions on file for this visit.           Megan Lawton MD   10/9/24

## 2024-10-11 ENCOUNTER — CARE COORDINATION (OUTPATIENT)
Dept: CARE COORDINATION | Age: 69
End: 2024-10-11

## 2024-10-11 NOTE — CARE COORDINATION
Ambulatory Care Coordination Note     10/11/2024 3:35 PM     Patient outreach attempt by this ACM today to offer care management services. ACM was unable to reach the patient by telephone today; left voice message requesting a return phone call to this ACM.  letter mailed requesting patient to contact this ACM.      ACM: Darryl Car RN     Care Summary Note: n/a    PCP/Specialist follow up:   Future Appointments         Provider Specialty Dept Phone    4/15/2025 9:00 AM Megan Lawton MD Primary Care [phone]    7/11/2025 10:00 AM Curt Mcclendon, DO Otolaryngology [phone]            Follow Up:   Plan for next AC outreach in approximately 1-2 days  to complete:  - outreach attempt to offer care management services.

## 2024-10-16 ENCOUNTER — HOSPITAL ENCOUNTER (EMERGENCY)
Age: 69
Discharge: HOME | End: 2024-10-16
Payer: MEDICARE

## 2024-10-16 VITALS
HEIGHT: 78 IN | TEMPERATURE: 98.3 F | BODY MASS INDEX: 33.55 KG/M2 | HEART RATE: 83 BPM | SYSTOLIC BLOOD PRESSURE: 163 MMHG | WEIGHT: 290 LBS | RESPIRATION RATE: 18 BRPM | OXYGEN SATURATION: 99 % | DIASTOLIC BLOOD PRESSURE: 90 MMHG

## 2024-10-16 DIAGNOSIS — Z51.89 VISIT FOR WOUND CHECK: Primary | ICD-10-CM

## 2024-10-16 PROCEDURE — 99211 OFF/OP EST MAY X REQ PHY/QHP: CPT

## 2024-10-16 RX ORDER — CEPHALEXIN 500 MG/1
500 CAPSULE ORAL 2 TIMES DAILY
Qty: 20 CAPSULE | Refills: 0 | Status: SHIPPED | OUTPATIENT
Start: 2024-10-16 | End: 2024-10-26

## 2024-10-16 ASSESSMENT — PAIN SCALES - GENERAL: PAINLEVEL_OUTOF10: 0

## 2024-10-16 ASSESSMENT — PAIN - FUNCTIONAL ASSESSMENT: PAIN_FUNCTIONAL_ASSESSMENT: 0-10

## 2024-10-16 NOTE — DISCHARGE INSTRUCTIONS
Keep area clean  Use antibiotic ointment and bandage to site daily until healed  Please follow up with primary care / podiatry.

## 2024-10-16 NOTE — ED PROVIDER NOTES
[facility] URGENT CARE  EMERGENCY DEPARTMENT ENCOUNTER        NAME: Chucho Morales  :  1955  MRN:  [id number]  Date of evaluation: 10/16/2024  Provider: Lionel Keller PA-C  PCP: Megan Lawton MD  Note Started : 8:27 AM EDT 10/16/24    Chief Complaint: Wound Check (Left pinky toe)      This is a 69-year-old male that presents to urgent care stating he injured his left fifth toe nail yesterday while using a stationary pedal bike.  He was barefoot and the nail got caught on something.  He denies any bony pain no numbness or tingling.  On first contact patient he appears to be in no acute distress.        Review of Systems  Pertinent positives and negatives are stated within HPI, all other systems reviewed and are negative.     Allergies: Cat hair extract, Other, Seasonal, and Ultram [tramadol hcl]     --------------------------------------------- PAST HISTORY ---------------------------------------------  Past Medical History:  has a past medical history of Arthritis, Cervical (neck) region somatic dysfunction, Cervicogenic headache, Chronic kidney disease, COPD (chronic obstructive pulmonary disease) ([facility]), DVT of lower extremity (deep venous thrombosis) ([facility]), GERD (gastroesophageal reflux disease), HIV (human immunodeficiency virus infection) ([facility]), Hx of blood clots, Hyperlipidemia, Hypertension, Muscle contraction headache, Pituitary tumor, Prostate enlargement, and Urinary retention.    Past Surgical History:  has a past surgical history that includes Abdomen surgery (); lymphadenectomy (Left); Colonoscopy; Foot surgery (Bilateral); Ankle surgery (Right); hernia repair; Heel spur surgery; other surgical history (Left, 2019); Hammer toe surgery (Left, 2019); Appendectomy; Cystoscopy (N/A, 2023); brain surgery (2018); and Knee arthroscopy (Left, 2023).    Social History:  reports that he quit smoking about 5 years ago. His smoking use included cigarettes. He

## 2024-10-17 ENCOUNTER — TELEPHONE (OUTPATIENT)
Dept: ENT CLINIC | Age: 69
End: 2024-10-17

## 2024-10-18 RX ORDER — AZELASTINE 1 MG/ML
2 SPRAY, METERED NASAL 2 TIMES DAILY
Qty: 120 ML | Refills: 1 | Status: SHIPPED | OUTPATIENT
Start: 2024-10-18

## 2024-10-25 ENCOUNTER — CARE COORDINATION (OUTPATIENT)
Dept: CARE COORDINATION | Age: 69
End: 2024-10-25

## 2024-10-25 SDOH — HEALTH STABILITY: PHYSICAL HEALTH: ON AVERAGE, HOW MANY DAYS PER WEEK DO YOU ENGAGE IN MODERATE TO STRENUOUS EXERCISE (LIKE A BRISK WALK)?: 2 DAYS

## 2024-10-25 SDOH — HEALTH STABILITY: PHYSICAL HEALTH: ON AVERAGE, HOW MANY MINUTES DO YOU ENGAGE IN EXERCISE AT THIS LEVEL?: 10 MIN

## 2024-10-25 NOTE — CARE COORDINATION
(4/6/2024)    PHQ-2     PHQ-2 Score: 1   Housing Stability: Unknown (4/6/2024)    Housing Stability Vital Sign     Unable to Pay for Housing in the Last Year: Not on file     Number of Places Lived in the Last Year: Not on file     Unstable Housing in the Last Year: No   Interpersonal Safety: Not At Risk (9/5/2023)    Interpersonal Safety Domain Source: IP Abuse Screening     Physical abuse: Denies     Verbal abuse: Denies     Emotional abuse: Denies     Financial abuse: Denies     Sexual abuse: Denies   Utilities: Not on file        Medications Reviewed:   Completed during this call    Advance Care Planning:   Not reviewed during this call     Care Planning:   Education Documentation  No documentation found.  Education Comments  No comments found.     ,    Goals Addressed    None          PCP/Specialist follow up:   Future Appointments         Provider Specialty Dept Phone    4/15/2025 9:00 AM Megan Lawton MD Primary Care [phone]    7/11/2025 10:00 AM Curt Mcclendon, DO Otolaryngology [phone]            Follow Up:   Plan for next ACM outreach in approximately 1 week to complete:  - disease specific assessments  - advance care planning   - RPM.   Patient  is agreeable to this plan.

## 2024-10-28 DIAGNOSIS — J44.9 COPD WITHOUT EXACERBATION (HCC): Primary | Chronic | ICD-10-CM

## 2024-10-28 DIAGNOSIS — I10 PRIMARY HYPERTENSION: ICD-10-CM

## 2024-10-28 NOTE — PROGRESS NOTES
Remote Patient Monitoring Treatment Plan    Received request from ACM/Darryl Cates RN   to order remote patient monitoring for in home monitoring of COPD; Condition managed by PCP.  HTN; Condition managed by PCP.  and order completed.     Patient will be monitoring blood pressure   pulse ox .      Patient will engage in Remote Patient Monitoring each day to develop the skills necessary for self management.       RPM Care Team Responsibilities:   Alerts will be reviewed daily and addressed within 2-4 hours during operational hours (Monday -Friday 9 am-4 pm)  Alert response and intervention documented in patient medical record  Alert response escalated to PCP per protocol and documented in patient medical record  Patient monitored over approximately  days  Discharge from program based on self-management readiness    See care coordination encounters for additional details.

## 2024-10-29 ENCOUNTER — CARE COORDINATION (OUTPATIENT)
Dept: PRIMARY CARE CLINIC | Age: 69
End: 2024-10-29

## 2024-10-29 NOTE — CARE COORDINATION
Remote Patient Kit Ordering Note      Date/Time:  10/29/2024 11:12 AM      [facility]family [facility] to notify of RPM kit order; patient/family was unavailable; Left HIPAA compliant voicemail regarding RPM kit.    [x] [facility]  [x] Patient will receive package over the next 1-3 business days. Someone 21 years or older must be present to sign for UPS delivery.  [x] HRS will contact patient within 24 hours, an HRS  will call the patient directly: If the patient does not answer, HRS will follow up with the clinical team notifying them about the unsuccessful attempt to contact the patient. HRS will make three call attempts to the patient.Provide patient with [facility] Virtual install number is: [phone].  [x] ACM will contact patient once equipment is active to welcome them to the program.                                                         [x] Hours of RPM monitoring - Monday-Friday [id number]; encourage patient to get vitals entered by Noon each day to have the alert addressed same day.  [x][facility] mailed RPM Patient flyer to patient.                      ACM made aware the RPM kit has been ordered.

## 2024-11-01 ENCOUNTER — CARE COORDINATION (OUTPATIENT)
Dept: CASE MANAGEMENT | Age: 69
End: 2024-11-01

## 2024-11-01 NOTE — CARE COORDINATION
Remote Patient Monitoring Welcome Note  Date/Time:  2024 3:33 PM  Patient Current Location: [place]  Verified patients name and  as identifiers.       Completed and confirmed the following:    [x] Patient received all RPM equipment (tablet, scale, blood pressure device and cuff, and pulse oximeter)  Cuff Size: large (13.8\"-19.68\")    Weight Scale:  none                    [x] Instructed patient keep box for use when returning equipment                                                          [x] Reviewed Patient Welcome Letter with patient    [x]  Reviewed Consent Form  Copy of consent form in chart.                 [x] Reviewed expectations for patient and care team  Monitoring hours M-F 9-4pm  It is important to take your vitals every day, even on the weekends,to keep your care team aware of how you are doing every day of the week.  Completing monitoring by 12pm on  so that alerts can be responded to in the same day  Patient weighs self at same time every day (or after urinating and waking up)  Take blood pressure 1-2 hrs after medications   RPM team may have different phone area code (including [facility], [place], [place] or [place])                              [x] Instructed patient to keep scale on flat surface                                                         [x] Instructed patient to keep tablet plugged in at all times                         [x] Instructed how to contact IT support  ([phone])  [x] Provided Remote Patient Monitoring care  information     Emergency Contact Verified: Mirian Hill               All questions answered at this time.  Yes    Savannah Crisostomo LPN    [phone]  [facility] [place] [facility] Coordinator / RPM     Documentation from office visit with Dr. Delgado on 1/20/20 and 6/2020 indicate Lisinopril was to be discontinued.   RN contacted patient to clarify if he has been taking Lisinopril this past 11 months. Luis  is unsure. He reports he fills his meds when he notices his pill bottles are empty. He has no recollection of how long Lisinopril pill bottle has been empty, but does recall receiving direction to stop taking it.  Medication list reviewed and is up to date. Printed and mailed for patient's reference.    Refills for Carvedilol and Potassium sent to [facility] pharmacy in [place]. LOV: 6/16/20  NOV: not scheduled. Labs: BMP 6/13/20  Refilled per [facility] protocol.

## 2024-11-08 ENCOUNTER — HOSPITAL ENCOUNTER (OUTPATIENT)
Age: 69
Discharge: HOME | End: 2024-11-08
Payer: OTHER GOVERNMENT

## 2024-11-08 ENCOUNTER — HOSPITAL ENCOUNTER (OUTPATIENT)
Dept: ULTRASOUND IMAGING | Age: 69
Discharge: HOME | End: 2024-11-08
Payer: OTHER GOVERNMENT

## 2024-11-08 VITALS — DIASTOLIC BLOOD PRESSURE: 85 MMHG | SYSTOLIC BLOOD PRESSURE: 149 MMHG | HEART RATE: 80 BPM | OXYGEN SATURATION: 92 %

## 2024-11-08 DIAGNOSIS — M79.604 RIGHT LEG PAIN: ICD-10-CM

## 2024-11-08 PROCEDURE — 93971 EXTREMITY STUDY: CPT

## 2024-11-13 ENCOUNTER — CARE COORDINATION (OUTPATIENT)
Dept: CASE MANAGEMENT | Age: 69
End: 2024-11-13

## 2024-11-13 NOTE — CARE COORDINATION
-Remote Alert Monitoring Note      Date/Time:  2024 10:41 AM  Patient Current Location: Home: [address] [place] [address]  [address]  [place] [id number]  Verified patients name and  as identifiers.    Rpm alert to be reviewed by the provider   red alert  pulse ox reading (91%)  Vitals Recheck pulse ox reading (94%)  Additional needs to be addressed by provider: No                   LPN contacted patient by telephone regarding red alert received   Background: enrolled in RPM for COPD AND HTN  Refer to [facility] immediately if:  Patient unresponsive or unable to provide history  Change in cognition or sudden confusion  Patient unable to respond in complete sentences  Intense chest pain/tightness  Any concern for any clinical emergency  Red Alert: Provider response time of 1 hr required for any red alert requiring intervention  Yellow Alert: Provider response time of 3hr required for any escalated yellow alert  Patient Chief Complaint:  Oxygen O2 Triage  Are you having any Chest Pain? no   Are you having any Shortness of Breath? no   Swelling in your hands or feet? no   Are you having any other health concerns or issues? no  .............................................................................................................................................................................................  Do you use oxygen? No   Patient educated on oxygen preparedness in case of an emergency?  No      Clinical Interventions: Reviewed and followed up on alerts and treatments-spoke to Chucho regarding [facility] red alert for low pulse ox metrics. Denies chest pain or abnormal dyspnea. Pt has COPD.   Pt speaks clearly in full sentences with no respiratory distress. No use of oxygen  Educated on checking pulse ox reading.Rub hands together briskly for 10 seconds to improve circulation. Lay hand flat on table, dresser or counter. Place pulse oximeter on index finger. verbalized understanding.  Pt education given to recheck

## 2024-11-13 NOTE — CARE COORDINATION
Date/Time:  11/13/2024 10:39 AM  LPN attempted to reach patient by telephone regarding red alert in remote patient monitoring program. Left HIPAA compliant message requesting a return call. Will attempt to reach patient again.    Rpm RED ALERT for pulse ox 91%

## 2024-11-14 ENCOUNTER — CARE COORDINATION (OUTPATIENT)
Dept: CARE COORDINATION | Age: 69
End: 2024-11-14

## 2024-11-14 NOTE — CARE COORDINATION
Ambulatory Care Coordination Note     11/14/2024 10:22 AM     Patient outreach attempt by this ACM today to perform care management follow up . ACM was unable to reach the patient by telephone today;   left voice message requesting a return phone call to this ACM.     ACM: Darryl Car RN     Care Summary Note: n/a    PCP/Specialist follow up:   Future Appointments         Provider Specialty Dept Phone    4/15/2025 9:00 AM Megan Lawton MD Primary Care [phone]    7/11/2025 10:00 AM Curt Mcclendon, DO Otolaryngology [phone]            Follow Up:   Plan for next AC outreach in approximately 1 week to complete:  - disease specific assessments  - goal progression  - RPM.

## 2024-11-18 ENCOUNTER — CARE COORDINATION (OUTPATIENT)
Dept: CASE MANAGEMENT | Age: 69
End: 2024-11-18

## 2024-11-18 NOTE — CARE COORDINATION
No metrics entered in RPM for 3 days.  [employer] message sent to pt  Chucho Morales , I am a nurse with the remote patient monitoring team;  reaching out to you today to remind you to please take your vitals. Important: Please try to take your vitals each day before 12pm. This ensures the monitoring team will have time to connect with your providers and get back to you with any new orders or instructions.    Enrolled in RPM for COPD AND HTN

## 2024-11-29 ENCOUNTER — CARE COORDINATION (OUTPATIENT)
Dept: CARE COORDINATION | Age: 69
End: 2024-11-29

## 2024-11-29 NOTE — CARE COORDINATION
Ambulatory Care Coordination Note     11/29/2024 2:23 PM     Patient outreach attempt by this ACM today to perform care management follow up . ACM was unable to reach the patient by telephone today;   left voice message requesting a return phone call to this ACM.     ACM: Darryl Car RN     Care Summary Note: n/a    PCP/Specialist follow up:   Future Appointments         Provider Specialty Dept Phone    4/15/2025 9:00 AM Megan Lawton MD Primary Care [phone]    7/11/2025 10:00 AM Curt Mcclendon, DO Otolaryngology [phone]            Follow Up:   Plan for next AC outreach in approximately 2 weeks to complete:  - disease specific assessments  - goal progression  - education   - RPM.

## 2024-11-30 NOTE — CARE COORDINATION
Ambulatory Care Coordination Note     2024 11:27 AM     Patient Current Location:  Home: [address] [place] [id number]     ACM contacted the patient by telephone. Verified name and  with patient as identifiers.         ACM: Darryl Car RN     Challenges to be reviewed by the provider   Additional needs identified to be addressed with provider No  none               Method of communication with provider: none.    Utilization: Patient has not had any utilization since our last call.     Care Summary Note: Patient states he [facility]  He states he is going to call PCP next week to schedule an appointment as the wound hasn't healed     Offered patient enrollment in the Remote Patient Monitoring (RPM) program for in-home monitoring: Yes, patient enrolled; current status is activated and monitoring.     Assessments Completed:   No changes since last call    Medications Reviewed:   Completed during a previous call     Advance Care Planning:   Not reviewed during this call     Care Planning:   Education Documentation  No documentation found.  Education Comments  No comments found.     ,    Goals Addressed    None          PCP/Specialist follow up:   Future Appointments         Provider Specialty Dept Phone    4/15/2025 9:00 AM Megan Lawton MD Primary Care [phone]    2025 10:00 AM Curt Mcclendon, DO Otolaryngology [phone]            Follow Up:   Plan for next AC outreach in approximately 3 weeks to complete:  - disease specific assessments  - RPM.   Patient  is agreeable to this plan.

## 2024-12-04 ENCOUNTER — CARE COORDINATION (OUTPATIENT)
Dept: CASE MANAGEMENT | Age: 69
End: 2024-12-04

## 2024-12-04 NOTE — CARE COORDINATION
RPM reviewed. No metrics entered for 2 days. SIVI message sent to pt.  Chucho Morales , I am a nurse with the remote patient monitoring team;  reaching out to you today to remind you to please take your vitals. Important: Please try to take your vitals each day before 12pm. This ensures the monitoring team will have time to connect with your providers and get back to you with any new orders or instructions.

## 2024-12-13 ENCOUNTER — HOSPITAL ENCOUNTER (EMERGENCY)
Age: 69
Discharge: HOME | End: 2024-12-13
Payer: MEDICARE

## 2024-12-13 VITALS
DIASTOLIC BLOOD PRESSURE: 87 MMHG | TEMPERATURE: 98.1 F | BODY MASS INDEX: 33.51 KG/M2 | SYSTOLIC BLOOD PRESSURE: 172 MMHG | WEIGHT: 290 LBS | RESPIRATION RATE: 20 BRPM | HEART RATE: 78 BPM | OXYGEN SATURATION: 97 %

## 2024-12-13 DIAGNOSIS — J01.90 ACUTE SINUSITIS, RECURRENCE NOT SPECIFIED, UNSPECIFIED LOCATION: Primary | ICD-10-CM

## 2024-12-13 PROCEDURE — 99211 OFF/OP EST MAY X REQ PHY/QHP: CPT

## 2024-12-13 RX ORDER — AMOXICILLIN AND CLAVULANATE POTASSIUM 500; 125 MG/1; MG/1
1 TABLET, FILM COATED ORAL 3 TIMES DAILY
Qty: 21 TABLET | Refills: 0 | Status: SHIPPED | OUTPATIENT
Start: 2024-12-13 | End: 2024-12-20

## 2024-12-13 RX ORDER — PREDNISONE 20 MG/1
20 TABLET ORAL DAILY
Qty: 5 TABLET | Refills: 0 | Status: SHIPPED | OUTPATIENT
Start: 2024-12-13 | End: 2024-12-18

## 2024-12-13 ASSESSMENT — PAIN - FUNCTIONAL ASSESSMENT: PAIN_FUNCTIONAL_ASSESSMENT: NONE - DENIES PAIN

## 2024-12-13 NOTE — ED PROVIDER NOTES
uvula swelling.      Comments: Oropharynx is patent.     Eyes:      Extraocular Movements: Extraocular movements intact.      Conjunctiva/sclera: Conjunctivae normal.      Pupils: Pupils are equal, round, and reactive to light.   Cardiovascular:      Rate and Rhythm: Normal rate and regular rhythm.      Pulses: Normal pulses.      Heart sounds: Normal heart sounds.   Pulmonary:      Effort: Pulmonary effort is normal.      Breath sounds: Normal breath sounds.   Musculoskeletal:         General: Normal range of motion.      Cervical back: Full passive range of motion without pain, normal range of motion and neck supple.   Lymphadenopathy:      Cervical: No cervical adenopathy.   Skin:     General: Skin is warm and dry.      Findings: No rash.   Neurological:      General: No focal deficit present.      Mental Status: He is alert and oriented to person, place, and time.   Psychiatric:         Behavior: Behavior is cooperative.         -------------------------------------------------- RESULTS -------------------------------------------------  No results found for this visit on 12/13/24.  No orders to display       Labs Reviewed - No data to display    When ordered only abnormal lab results are displayed. All other labs were within normal range or not returned as of this dictation.    Interpretation per the Radiologist below, if available at the time of this note:    No orders to display     No results found.    No results found.     -------------------------------------------------PROCEDURES--------------------------------------------  Unless otherwise noted below, none      Procedures      ---EMERGENCY DEPARTMENT COURSE and DIFFERENTIAL DIAGNOSIS/MDM---  (CC/HPI Summary, DDx, ED Course, and Reassessment:) (Disposition Considerations (include 1 Tests not done, Admit vs D/C, Shared Decision Making, Pt Expectation of Test or Tx., Consults, Social Determinants, Chronic Conditiions, Records reviewed)    MDM  Number of  specified, unspecified location        Disposition: Discharge to home  Patient condition is good    I am the Primary Clinician of Record.     Lionel Keller PA-C (electronically signed) on 12/13/2024 at 5:54 PM         Lionel Keller PA-C  12/13/24 [id number]

## 2024-12-16 ENCOUNTER — CARE COORDINATION (OUTPATIENT)
Dept: CASE MANAGEMENT | Age: 69
End: 2024-12-16

## 2024-12-16 ENCOUNTER — CARE COORDINATION (OUTPATIENT)
Dept: CARE COORDINATION | Age: 69
End: 2024-12-16

## 2024-12-16 ASSESSMENT — ENCOUNTER SYMPTOMS: DYSPNEA ASSOCIATED WITH: EXERTION

## 2024-12-16 NOTE — CARE COORDINATION
Ambulatory Care Coordination Note     12/16/2024 1:53 PM     Patient outreach attempt by this ACM today to perform care management follow up . ACM was unable to reach the patient by telephone today;   left voice message requesting a return phone call to this ACM.     ACM: Selam Richards RN     Care Summary Note: Unable to contact for ED follow up. Will update assigned ACM.    PCP/Specialist follow up:   Future Appointments         Provider Specialty Dept Phone    4/15/2025 9:00 AM Megan Lawton MD Primary Care [phone]    7/11/2025 10:00 AM Curt Mcclendon, DO Otolaryngology [phone]            Follow Up:   Plan for next ACM outreach in approximately 1 week to complete:  - disease specific assessments  - RPM.

## 2024-12-16 NOTE — CARE COORDINATION
Ambulatory Care Coordination Note     2024 3:33 PM     Patient Current Location:  Home: [address] [place] [id number]     Patient contacted the ACM by telephone. Verified name and  with patient as identifiers.         ACM: Selam Richards RN     Challenges to be reviewed by the provider   Additional needs identified to be addressed with provider No  none               Method of communication with provider: none.    Utilization: Has the patient been seen in the ED since your last call? Yes,   Discharge Date: 24   Discharge Facility: [facility]  Reason for ED Visit: facial pain  Visit Diagnosis: acute sinusitis    Number of ED visits in the last 6 months: 5      Do you have any ongoing symptoms? Yes, my symptoms have improved.   Current symptoms: sinus congestion.    Did you call your PCP prior to going to the ED? No, did not call the PCP office.     Review of Discharge Instructions:   [x] AVS discharge instructions  [x] Right Care, Right Place, Right Time document  [x] Medication changes  [x] Follow up appointments  [] Referral follow up        Care Summary Note:   [facility] contacted Chucho for ED/care coordination follow up. He reports he is taking the augmentin and prednisone as prescribed.    He states he is feeling \"a little better\".   He declined assistance scheduling an ED follow up and states he will schedule if necessary.    Offered patient enrollment in the Remote Patient Monitoring (RPM) program for in-home monitoring: Yes, patient enrolled; current status is activated and monitoring.     Assessments Completed:   COPD Assessment    Does the patient understand envrionmental exposure?: Yes  Is the patient able to verbalize Rescue vs. Long Acting medications?: Yes  Does the patient have a nebulizer?: Yes  Does the patient use a space with inhaled medications?: No            Symptoms:     Symptom course: improving  Breathlessness: exertion  Increase use of rapid

## 2024-12-16 NOTE — CARE COORDINATION
Date/Time:  12/16/2024 10:47 AM  LPN attempted to reach patient by telephone regarding yellow alert in remote patient monitoring program. Left HIPAA compliant message requesting a return call. Will attempt to reach patient again.    ENROLLED IN RPM FOR COPD AND HTN  Message to [facility]  Chucho Morales  is currently enrolled in Remote Patient Monitoring (RPM) and has not entered vitals in 4 days. The RPM team has  Been Unable to Reach your patient to discuss adherence in RPM.    Please attempt to outreach your patient and discuss adherence with RPM. If patient is no longer interested in participating please send a dis-enrollment request to the RPM pool for processing.     Thank You,

## 2024-12-17 NOTE — PROGRESS NOTES
[facility] PRE-ADMISSION TESTING INSTRUCTIONS    The Preadmission Testing patient is instructed accordingly using the following criteria (check applicable):    ARRIVAL INSTRUCTIONS:   Arrival Time: 0530    [x] Parking the day of Surgery is located in the Main Entrance lot.  Upon entering through the main entrance (Entrance A) make an immediate right to the surgery reception desk    [x] Bring photo ID and insurance card    [] Bring in a copy of Living will or Durable Power of  papers.    [x] Please be sure to arrange for a responsible adult to provide transportation to and from the hospital    [x] Please arrange for a responsible adult to be with you for the 24 hour period post procedure, due to having anesthesia    [x] Please notify surgeon if you develop any illness between now and time of surgery (cold, cough, sore throat, fever, nausea, vomiting) or any signs of infections  including skin, wounds, and dental.    [x] If you awake am of surgery not feeling well or have temperature >100 please call [phone].    GENERAL INSTRUCTIONS:    [x] No solid foods after midnight, may have up to 8oz of water until 4 hours prior to surgery. No gum, no candy, no mints.  NPO time: 0330       [x] You may brush your teeth    [x] Take medications as instructed     [x] Bring inhalers day of surgery    [x] Stop herbal supplements and vitamins 5 days prior to procedure    [x] Follow preop dosing of blood thinners per physician instructions    [] Take 1/2 dose of evening insulin, but no insulin after midnight    [] No oral diabetic medications after midnight    [] If diabetic and have low blood sugar or feel symptomatic, take 1-2oz apple juice only    [] Bring urine specimen day of surgery    [x] Shower or bath with soap, lather and rinse well, AM of Surgery, no lotion, powders or creams to surgical site    [x] Please do not wear any nail polish, make up, hair products, body spray, aftershave,

## 2024-12-18 ENCOUNTER — CARE COORDINATION (OUTPATIENT)
Dept: CASE MANAGEMENT | Age: 69
End: 2024-12-18

## 2024-12-18 NOTE — CARE COORDINATION
-Remote Alert Monitoring Note  Date/Time:  2024 12:40 PM  Patient Current Location: [place]  Verified patients name and  as identifiers.    Rpm alert to be reviewed by the provider   yellow alert  blood pressure reading (172/86)  Vitals Recheck blood pressure reading ( )  Additional needs to be addressed by provider: No         LPN attempted to contacted patient by telephone regarding red alert received   Background: COPD, HTN  Refer to 911 immediately if:  Patient unresponsive or unable to provide history  Change in cognition or sudden confusion  Patient unable to respond in complete sentences  Intense chest pain/tightness  Any concern for any clinical emergency  Red Alert: Provider response time of 1 hr required for any red alert requiring intervention  Yellow Alert: Provider response time of 3hr required for any escalated yellow alert    Clinical Interventions: Reviewed and followed up on alerts and treatments-Patient has elevated BP of 172/86.      Attempted to reach patient for RPM Red Alert Call. Unable to reach patient.  Left HIPAA Compliant message on Voice Mail to call.  Phone number left on Voice Mail to call back.    Attempted to reach ER Contact, Mirian Hill - No Answer.   Will continue to follow.     Savannah Crisostomo LPN    [phone]  [facility] Coordinator    Plan/Follow Up: Will continue to review, monitor and address alerts with follow up based on severity of symptoms and risk factors.  **For any new or worsening symptoms or you are concerned in anyway, please contact your Provider or report to the nearest Emergency Room.**

## 2024-12-18 NOTE — CARE COORDINATION
-Remote Alert Monitoring Note  Date/Time:  2024 1:18 PM  Patient Current Location: [place]  Verified patients name and  as identifiers.    Rpm alert to be reviewed by the provider   yellow alert  blood pressure reading (172/86)  Vitals Recheck blood pressure reading (142/85)  Additional needs to be addressed by provider: No         Patient rechecked BP at 142/85.  All reported metrics are WNL of RPM Alert Parameters.    Savannah Crisostomo LPN    [phone]  [facility] / [facility] Coordinator / RPM

## 2024-12-18 NOTE — CARE COORDINATION
Opened in ERROR    Savannah Crisostomo, TYRONE    [phone]  [facility] / [facility] [place] [facility] Coordinator / RPM

## 2024-12-20 ENCOUNTER — PREP FOR PROCEDURE (OUTPATIENT)
Dept: UROLOGY | Age: 69
End: 2024-12-20

## 2024-12-20 RX ORDER — SODIUM CHLORIDE 9 MG/ML
INJECTION, SOLUTION INTRAVENOUS PRN
Status: CANCELLED | OUTPATIENT
Start: 2024-12-20

## 2024-12-20 RX ORDER — SODIUM CHLORIDE 0.9 % (FLUSH) 0.9 %
5-40 SYRINGE (ML) INJECTION PRN
Status: CANCELLED | OUTPATIENT
Start: 2024-12-20

## 2024-12-20 RX ORDER — SODIUM CHLORIDE 0.9 % (FLUSH) 0.9 %
5-40 SYRINGE (ML) INJECTION EVERY 12 HOURS SCHEDULED
Status: CANCELLED | OUTPATIENT
Start: 2024-12-20

## 2024-12-20 RX ORDER — SODIUM CHLORIDE 9 MG/ML
INJECTION, SOLUTION INTRAVENOUS CONTINUOUS
Status: CANCELLED | OUTPATIENT
Start: 2024-12-20

## 2024-12-23 ENCOUNTER — CARE COORDINATION (OUTPATIENT)
Dept: CASE MANAGEMENT | Age: 69
End: 2024-12-23

## 2024-12-23 ENCOUNTER — ANESTHESIA (OUTPATIENT)
Dept: OPERATING ROOM | Age: 69
End: 2024-12-23
Payer: MEDICARE

## 2024-12-23 ENCOUNTER — HOSPITAL ENCOUNTER (OUTPATIENT)
Age: 69
Setting detail: OUTPATIENT SURGERY
Discharge: HOME | End: 2024-12-23
Attending: STUDENT IN AN ORGANIZED HEALTH CARE EDUCATION/TRAINING PROGRAM | Admitting: STUDENT IN AN ORGANIZED HEALTH CARE EDUCATION/TRAINING PROGRAM
Payer: MEDICARE

## 2024-12-23 ENCOUNTER — ANESTHESIA EVENT (OUTPATIENT)
Dept: OPERATING ROOM | Age: 69
End: 2024-12-23
Payer: MEDICARE

## 2024-12-23 VITALS
RESPIRATION RATE: 18 BRPM | TEMPERATURE: 97 F | DIASTOLIC BLOOD PRESSURE: 68 MMHG | SYSTOLIC BLOOD PRESSURE: 113 MMHG | HEART RATE: 88 BPM | OXYGEN SATURATION: 93 % | WEIGHT: 290 LBS | HEIGHT: 78 IN | BODY MASS INDEX: 33.55 KG/M2

## 2024-12-23 DIAGNOSIS — R97.20 ELEVATED PROSTATE SPECIFIC ANTIGEN (PSA): ICD-10-CM

## 2024-12-23 PROCEDURE — 3600000012 HC SURGERY LEVEL 2 ADDTL 15MIN: Performed by: STUDENT IN AN ORGANIZED HEALTH CARE EDUCATION/TRAINING PROGRAM

## 2024-12-23 PROCEDURE — 2500000003 HC RX 250 WO HCPCS: Performed by: NURSE PRACTITIONER

## 2024-12-23 PROCEDURE — 6360000002 HC RX W HCPCS: Performed by: STUDENT IN AN ORGANIZED HEALTH CARE EDUCATION/TRAINING PROGRAM

## 2024-12-23 PROCEDURE — 7100000010 HC PHASE II RECOVERY - FIRST 15 MIN: Performed by: STUDENT IN AN ORGANIZED HEALTH CARE EDUCATION/TRAINING PROGRAM

## 2024-12-23 PROCEDURE — 3600000002 HC SURGERY LEVEL 2 BASE: Performed by: STUDENT IN AN ORGANIZED HEALTH CARE EDUCATION/TRAINING PROGRAM

## 2024-12-23 PROCEDURE — 2709999900 HC NON-CHARGEABLE SUPPLY: Performed by: STUDENT IN AN ORGANIZED HEALTH CARE EDUCATION/TRAINING PROGRAM

## 2024-12-23 PROCEDURE — 7100000011 HC PHASE II RECOVERY - ADDTL 15 MIN: Performed by: STUDENT IN AN ORGANIZED HEALTH CARE EDUCATION/TRAINING PROGRAM

## 2024-12-23 PROCEDURE — 2580000003 HC RX 258: Performed by: NURSE PRACTITIONER

## 2024-12-23 PROCEDURE — 3700000001 HC ADD 15 MINUTES (ANESTHESIA): Performed by: STUDENT IN AN ORGANIZED HEALTH CARE EDUCATION/TRAINING PROGRAM

## 2024-12-23 PROCEDURE — 6360000002 HC RX W HCPCS: Performed by: NURSE PRACTITIONER

## 2024-12-23 PROCEDURE — 88344 IMHCHEM/IMCYTCHM EA MLT ANTB: CPT

## 2024-12-23 PROCEDURE — 3700000000 HC ANESTHESIA ATTENDED CARE: Performed by: STUDENT IN AN ORGANIZED HEALTH CARE EDUCATION/TRAINING PROGRAM

## 2024-12-23 PROCEDURE — 6360000002 HC RX W HCPCS: Performed by: NURSE ANESTHETIST, CERTIFIED REGISTERED

## 2024-12-23 PROCEDURE — 88305 TISSUE EXAM BY PATHOLOGIST: CPT

## 2024-12-23 RX ORDER — MIDAZOLAM HYDROCHLORIDE 1 MG/ML
INJECTION, SOLUTION INTRAMUSCULAR; INTRAVENOUS
Status: DISCONTINUED | OUTPATIENT
Start: 2024-12-23 | End: 2024-12-23 | Stop reason: SDUPTHER

## 2024-12-23 RX ORDER — LIDOCAINE HYDROCHLORIDE 20 MG/ML
INJECTION, SOLUTION EPIDURAL; INFILTRATION; INTRACAUDAL; PERINEURAL
Status: DISCONTINUED | OUTPATIENT
Start: 2024-12-23 | End: 2024-12-23 | Stop reason: SDUPTHER

## 2024-12-23 RX ORDER — SODIUM CHLORIDE 0.9 % (FLUSH) 0.9 %
5-40 SYRINGE (ML) INJECTION EVERY 12 HOURS SCHEDULED
Status: DISCONTINUED | OUTPATIENT
Start: 2024-12-23 | End: 2024-12-23 | Stop reason: HOSPADM

## 2024-12-23 RX ORDER — FENTANYL CITRATE 50 UG/ML
INJECTION, SOLUTION INTRAMUSCULAR; INTRAVENOUS
Status: DISCONTINUED | OUTPATIENT
Start: 2024-12-23 | End: 2024-12-23 | Stop reason: SDUPTHER

## 2024-12-23 RX ORDER — SODIUM CHLORIDE 9 MG/ML
INJECTION, SOLUTION INTRAVENOUS CONTINUOUS
Status: DISCONTINUED | OUTPATIENT
Start: 2024-12-23 | End: 2024-12-23 | Stop reason: HOSPADM

## 2024-12-23 RX ORDER — SODIUM CHLORIDE 0.9 % (FLUSH) 0.9 %
5-40 SYRINGE (ML) INJECTION PRN
Status: DISCONTINUED | OUTPATIENT
Start: 2024-12-23 | End: 2024-12-23 | Stop reason: HOSPADM

## 2024-12-23 RX ORDER — LIDOCAINE HYDROCHLORIDE 10 MG/ML
INJECTION, SOLUTION EPIDURAL; INFILTRATION; INTRACAUDAL; PERINEURAL PRN
Status: DISCONTINUED | OUTPATIENT
Start: 2024-12-23 | End: 2024-12-23 | Stop reason: ALTCHOICE

## 2024-12-23 RX ORDER — PROPOFOL 10 MG/ML
INJECTION, EMULSION INTRAVENOUS
Status: DISCONTINUED | OUTPATIENT
Start: 2024-12-23 | End: 2024-12-23 | Stop reason: SDUPTHER

## 2024-12-23 RX ORDER — SODIUM CHLORIDE 9 MG/ML
INJECTION, SOLUTION INTRAVENOUS PRN
Status: DISCONTINUED | OUTPATIENT
Start: 2024-12-23 | End: 2024-12-23 | Stop reason: HOSPADM

## 2024-12-23 RX ORDER — DIPHENHYDRAMINE HYDROCHLORIDE 50 MG/ML
INJECTION INTRAMUSCULAR; INTRAVENOUS
Status: DISCONTINUED | OUTPATIENT
Start: 2024-12-23 | End: 2024-12-23 | Stop reason: SDUPTHER

## 2024-12-23 RX ORDER — ONDANSETRON 2 MG/ML
INJECTION INTRAMUSCULAR; INTRAVENOUS
Status: DISCONTINUED | OUTPATIENT
Start: 2024-12-23 | End: 2024-12-23 | Stop reason: SDUPTHER

## 2024-12-23 RX ADMIN — DIPHENHYDRAMINE HYDROCHLORIDE 50 MG: 50 INJECTION, SOLUTION INTRAMUSCULAR; INTRAVENOUS at 07:30

## 2024-12-23 RX ADMIN — SODIUM CHLORIDE: 9 INJECTION, SOLUTION INTRAVENOUS at 06:18

## 2024-12-23 RX ADMIN — LIDOCAINE HYDROCHLORIDE 40 MG: 20 INJECTION, SOLUTION EPIDURAL; INFILTRATION; INTRACAUDAL; PERINEURAL at 07:30

## 2024-12-23 RX ADMIN — WATER 3000 MG: 1 INJECTION INTRAMUSCULAR; INTRAVENOUS; SUBCUTANEOUS at 07:18

## 2024-12-23 RX ADMIN — MIDAZOLAM 2 MG: 1 INJECTION INTRAMUSCULAR; INTRAVENOUS at 07:42

## 2024-12-23 RX ADMIN — DIPHENHYDRAMINE HYDROCHLORIDE 25 MG: 50 INJECTION, SOLUTION INTRAMUSCULAR; INTRAVENOUS at 07:19

## 2024-12-23 RX ADMIN — PROPOFOL 150 MCG/KG/MIN: 10 INJECTION, EMULSION INTRAVENOUS at 07:31

## 2024-12-23 RX ADMIN — ONDANSETRON 4 MG: 2 INJECTION INTRAMUSCULAR; INTRAVENOUS at 07:30

## 2024-12-23 RX ADMIN — FENTANYL CITRATE 50 MCG: 50 INJECTION, SOLUTION INTRAMUSCULAR; INTRAVENOUS at 07:38

## 2024-12-23 RX ADMIN — FENTANYL CITRATE 50 MCG: 50 INJECTION, SOLUTION INTRAMUSCULAR; INTRAVENOUS at 07:30

## 2024-12-23 RX ADMIN — PROPOFOL 50 MG: 10 INJECTION, EMULSION INTRAVENOUS at 07:30

## 2024-12-23 ASSESSMENT — COPD QUESTIONNAIRES: CAT_SEVERITY: MODERATE

## 2024-12-23 NOTE — ANESTHESIA PRE PROCEDURE
Department of Anesthesiology  Preprocedure Note       Name:  Chucho Morlaes   Age:  69 y.o.  :  1955                                          MRN:  [id number]         Date:  2024      Surgeon: Surgeon(s):  James Pavon MD    Procedure: Procedure(s):  TRANSPERINEAL ULTRASOUND GUIDED PROSTATE MRI FUSION BIOPSY ++FORTEC++    Medications prior to admission:   Prior to Admission medications    Medication Sig Start Date End Date Taking? Authorizing Provider   lisinopril (PRINIVIL;ZESTRIL) 20 MG tablet Take 1 tablet by mouth daily 10/9/24  Yes Megan Lawton MD   Vitamin D, Cholecalciferol, 50 MCG (2000 UT) CAPS Take 2,000 Units by mouth daily 24  Yes Megan Lawton MD   albuterol sulfate HFA (PROVENTIL;VENTOLIN;PROAIR) 108 (90 Base) MCG/ACT inhaler  23  Yes La uDran MD   loratadine (CLARITIN) 10 MG capsule Take 1 capsule by mouth daily   Yes La Duran MD   methocarbamol (ROBAXIN) 500 MG tablet Take 2 tablets by mouth as needed 3/13/23  Yes La Duran MD   acetaminophen (TYLENOL) 325 MG tablet Take 2 tablets by mouth every 6 hours as needed 19  Yes La Duran MD   omeprazole (PRILOSEC) 20 MG delayed release capsule Take 1 capsule by mouth at bedtime   Yes La Duran MD   Abacavir-Dolutegravir-Lamivud 600- MG TABS Take 1 tablet by mouth nightly   Yes La Duran MD   rosuvastatin (CRESTOR) 40 MG tablet Take 1 tablet by mouth every evening   Yes La Duran MD   azelastine (ASTELIN) 0.1 % nasal spray 2 sprays by Nasal route 2 times daily Use in each nostril as directed 10/18/24   Curt Mcclendon DO   hydrocortisone 2.5 % cream Apply topically 2 times daily as needed Apply topically 2 times daily.    La Duran MD   ZEASORB-AF 2 % powder APPLY TO GROIN UP TO TWICE DAILY AS NEEDED 1/3/23   La Duran MD   carboxymethylcellulose (REFRESH PLUS) 0.5 % SOLN ophthalmic solution Apply 1 drop

## 2024-12-23 NOTE — ANESTHESIA POSTPROCEDURE EVALUATION
Department of Anesthesiology  Postprocedure Note    Patient: Chucho Morales  MRN: [id number]  YOB: 1955  Date of evaluation: 12/23/2024    Procedure Summary       Date: 12/23/24 Room / Location: [address]    Anesthesia Start: 0717 Anesthesia Stop:     Procedure: TRANSPERINEAL ULTRASOUND GUIDED PROSTATE MRI FUSION BIOPSY ++FORTEC++ (Perineum) Diagnosis:       Elevated prostate specific antigen (PSA)      (Elevated prostate specific antigen (PSA) [R97.20])    Surgeons: James Pavon MD Responsible Provider: Hortensia Horvath DO    Anesthesia Type: MAC ASA Status: 3            Anesthesia Type: No value filed.    Cookie Phase I: Cookie Score: 10    Cookie Phase II:      Anesthesia Post Evaluation    Patient location during evaluation: PACU  Patient participation: waiting for patient participation  Level of consciousness: awake  Airway patency: patent  Nausea & Vomiting: no nausea and no vomiting  Cardiovascular status: hemodynamically stable  Respiratory status: acceptable  Hydration status: euvolemic  Pain management: adequate        No notable events documented.

## 2024-12-23 NOTE — CARE COORDINATION
RPM reviewed. No metrics x 3 days. Noted pt had a procedure today at [facility].  "[employer]" message sent to pt.  No call  Chucho Morales , I am a nurse with the remote patient monitoring team;  reaching out to you today to remind you to please take your vitals. Important: Please try to take your vitals each day before 12pm. This ensures the monitoring team will have time to connect with your providers and get back to you with any new orders or instructions.

## 2024-12-23 NOTE — DISCHARGE INSTRUCTIONS
You underwent a transperineal fusion biopsy of the prostate.    It is normal to have blood in your urine and your semen for up to a month.  You do not require any other antibiotics at this time and you did get antibiotics in the operative suite    Biopsy results usually take 7 to 10 days.  You will be scheduled appropriately with your physician for follow-up    Should you have any fevers or chills please do not hesitate to call the office  He will have some tenderness in the perineal area for up to a week however this should subside.    If you have any pain after surgery Tylenol should feel you need for pain control    You were okay to restart your blood thinners 3 days after your biopsy

## 2024-12-23 NOTE — OP NOTE
Operative Note      Patient: Chucho Morales  YOB: 1955  MRN: [id number]    Date of Procedure: 12/23/2024    Pre-Op Diagnosis Codes:      * Elevated prostate specific antigen (PSA) [R97.20]    Post-Op Diagnosis: Same       Procedure(s):    TRANSPERINEAL ULTRASOUND GUIDED PROSTATE MRI FUSION BIOPSY     Surgeon(s):  James Pavon MD    Assistant:   * No surgical staff found *    Anesthesia: Monitor Anesthesia Care    Estimated Blood Loss (mL): Minimal    Complications: None    Specimens:   ID Type Source Tests Collected by Time Destination   A : RIGHT ANTERIOR APEX Tissue Tissue SURGICAL PATHOLOGY James Pavon MD 12/23/2024 0642    B : RIGHT ANTERIOR BASE Tissue Tissue SURGICAL PATHOLOGY James Pavon MD 12/23/2024 0642    C : RIGHT POSTERIOR APEX Tissue Tissue SURGICAL PATHOLOGY James Pavon MD 12/23/2024 0643    D : RIGHT POSTERIOR BASE Tissue Tissue SURGICAL PATHOLOGY James Pavon MD 12/23/2024 0643    E : RIGHT LATERAL Tissue Tissue SURGICAL PATHOLOGY James Pavon MD 12/23/2024 0643    F : MIDLINE APEX Tissue Tissue SURGICAL PATHOLOGY James Pavon MD 12/23/2024 0643    G : MIDLINE BASE Tissue Tissue SURGICAL PATHOLOGY James Pavon MD 12/23/2024 0643    H : LEFT ANTERIOR APEX Tissue Tissue SURGICAL PATHOLOGY James Pavon MD 12/23/2024 0646    I : LEFT ANTERIOR BASE Tissue Tissue SURGICAL PATHOLOGY James Pavon MD 12/23/2024 0646    J : LEFT POSTERIOR APEX Tissue Tissue SURGICAL PATHOLOGY James Pavon MD 12/23/2024 0647    K : LEFT POSTERIOR BASE Tissue Tissue SURGICAL PATHOLOGY James Pavon MD 12/23/2024 0647    L : LEFT LATERAL Tissue Tissue SURGICAL PATHOLOGY James Pavon MD 12/23/2024 0653    M : LESION ONE Tissue Tissue SURGICAL PATHOLOGY James Pavon MD 12/23/2024 0745    N : LESION TWO Tissue Tissue SURGICAL PATHOLOGY James Pavon MD 12/23/2024 0745    O : LESION THREE Tissue Tissue SURGICAL PATHOLOGY James Pavon MD 12/23/2024 0751    P : LESION FOUR Tissue  Tissue SURGICAL PATHOLOGY James Pavon MD 12/23/2024 0752        Implants:  * No implants in log *      Drains: * No LDAs found *    Findings:  Infection Present At Time Of Surgery (PATOS) (choose all levels that have infection present):  No infection present  Other Findings: see op note    Detailed Description of Procedure:     Patient is a 69-year-old male who was found to have an elevated PSA in the office.  All options were given to the patient.  He did undergo an MRI of his prostate.  He is then elected to undergo MRI fusion biopsy in the transperineal approach.  All risk benefits and alternatives were discussed with the patient informed consent was obtained and signed.    Operation:    Patient was wheeled back in the operative suite.  Upon entering the operative suite the patient identified using name band and number.  The patient was transferred the operative table and placed in supine position.  Patient was then placed in the dorsolithotomy position prepped and draped in usual sterile fashion.  At that time attention was given to the perineum where this was completely prepped and ready for biopsy.  An ultrasound probe was then inserted into the rectum atraumatically.  We identified the prostate under ultrasound guidance.    The ultrasound was then measured in a three-dimensional fashion.  Ultrasound volume of the prostate was found to be 65 grams.    At that time we then did a dorsal prostatic block.  As well as numbing all skin for where the biopsy would be taken.    We then used the ultrasound along with the MRI guidance diffuse the 2 images together to provide us the imaging needed for biopsy.    Attention was given to the MRI guided PI-RADS lesion.  4 biopsies were taken of all PI-RADS lesions.    We then did a standard biopsy.  Biopsies were taken from the right anterior  apex, right anterior apex base, right lateral, right posterior apex, right posterior base, midline apex and midline base, left

## 2024-12-23 NOTE — H&P
Other, Seasonal, and Ultram [tramadol hcl]    Social History     Tobacco Use    Smoking status: Former     Current packs/day: 0.00     Average packs/day: 1 pack/day for 40.0 years (40.0 ttl pk-yrs)     Types: Cigarettes     Start date: 1979     Quit date: 2019     Years since quittin.9    Smokeless tobacco: Never   Substance Use Topics    Alcohol use: No        Review of Systems:  Respiratory: negative for cough and hemoptysis  Cardiovascular: negative for chest pain and dyspnea  Gastrointestinal: negative for abdominal pain, diarrhea, nausea and vomiting  Genitourinary: as per HPI, otherwise negative.  Derm: negative for rash and skin lesion(s)  Neurological: negative for seizures and tremors  Endocrine: negative for diabetic symptoms including polydipsia and polyuria  All other systems negative    Physical Exam:  Vitals:    24 0615   BP: (!) 144/93   Pulse: 78   Resp: 18   Temp: 97.9 °F (36.6 °C)   SpO2: 94%      Skin:  Warm and dry.  No rash or bruises  HEENT:  PERRLA, EOMI  Neck:  No JVD, No thyromegaly  Cardiac:  RRR  Lungs:  No audible wheezes, symmetric respirations, non-labored  Abdomen: Soft, non-tender, non-distended  Extremities:  No clubbing, edema or cyanosis  Neurological:  Moves all extremities, normal DTR    Lab Results   Component Value Date    WBC 8.2 2023    HGB 14.6 2023    HCT 43.4 2023    MCV 94.3 2023     2023       Lab Results   Component Value Date    CREATININE 1.3 (H) 10/02/2024       Lab Results   Component Value Date    PSA 5.88 (H) 10/02/2024    PSA 1.38 2023       No results for input(s): \"LABURIN\" in the last 72 hours.    No results for input(s): \"BC\" in the last 72 hours.    No results for input(s): \"BLOODCULT2\" in the last 72 hours.    Assessment and Plan:  1. Fusion biopsy

## 2025-01-01 LAB — SURGICAL PATHOLOGY REPORT: NORMAL

## 2025-01-08 ENCOUNTER — CARE COORDINATION (OUTPATIENT)
Dept: CARE COORDINATION | Age: 70
End: 2025-01-08

## 2025-01-08 NOTE — CARE COORDINATION
Ambulatory Care Coordination Note     1/8/2025 12:48 PM     Patient outreach attempt by this ACM today to perform care management follow up . ACM was unable to reach the patient by telephone today;   left voice message requesting a return phone call to this ACM.     ACM: Darryl Car RN     Care Summary Note: na    PCP/Specialist follow up:   Future Appointments         Provider Specialty Dept Phone    4/15/2025 9:00 AM Megan Lawton MD Primary Care [phone]    7/11/2025 10:00 AM Curt Mcclendon, DO Otolaryngology [phone]            Follow Up:   Plan for next ACM outreach in approximately 1 week to complete:  - disease specific assessments  - goal progression  - education   - RPM.

## 2025-01-08 NOTE — CARE COORDINATION
Ambulatory Care Coordination Note     2025 1:42 PM     Patient Current Location:  Home: [address] [place] [address]  [address]  [facility] [address]     Patient contacted the ACM by telephone. Verified name and  with patient as identifiers.         ACM: Darryl Car RN     Challenges to be reviewed by the provider   Additional needs identified to be addressed with provider No  none               Method of communication with provider: none.    Utilization: Patient has not had any utilization since our last call.     Care Summary Note:   Chucho states the wound on his right leg is healed.  He states his biopsy came back negative.     Offered patient enrollment in the Remote Patient Monitoring (RPM) program for in-home monitoring: Yes, patient enrolled; current status is activated and monitoring.     Assessments Completed:   COPD Assessment    Does the patient understand envrionmental exposure?: Yes  Is the patient able to verbalize Rescue vs. Long Acting medications?: Yes  Does the patient have a nebulizer?: Yes  Does the patient use a space with inhaled medications?: No            Symptoms:               Medications Reviewed:   Patient denies any changes with medications and reports taking all medications as prescribed.    Advance Care Planning:   Not reviewed during this call     Care Planning:   Not completed during this call    PCP/Specialist follow up:   Future Appointments         Provider Specialty Dept Phone    4/15/2025 9:00 AM Megan Lawton MD Primary Care [phone]    2025 10:00 AM Curt Mcclendon, DO Otolaryngology [phone]            Follow Up:   Plan for next [facility] outreach in approximately 3 weeks to complete:  - disease specific assessments  - advance care planning   - goal progression  - RPM.   Patient  is agreeable to this plan.

## 2025-01-13 ENCOUNTER — CARE COORDINATION (OUTPATIENT)
Dept: CASE MANAGEMENT | Age: 70
End: 2025-01-13

## 2025-01-13 NOTE — CARE COORDINATION
Date/Time:  1/13/2025 10:27 AM  LPN attempted to reach patient by telephone regarding  no metrics x 3 days  in remote patient monitoring program. Left HIPAA compliant message requesting a return call. Will attempt to reach patient again.    Enrolled in RPM for COPD AND HTN

## 2025-02-17 ENCOUNTER — CARE COORDINATION (OUTPATIENT)
Dept: CARE COORDINATION | Age: 70
End: 2025-02-17

## 2025-02-17 NOTE — CARE COORDINATION
Ambulatory Care Coordination Note     2025 12:39 PM     Patient Current Location:  Home: [address] [place] [address]  [address]  [facility] [address]     ACM contacted the patient by telephone. Verified name and  with patient as identifiers.         ACM: Darryl Car RN     Challenges to be reviewed by the provider   Additional needs identified to be addressed with provider No  none               Method of communication with provider: none.    Utilization: Has the patient been seen in the ED since your last call? no    Care Summary Note: [facility].  He states he is going to ask for a different type of walker as the one he has does not work well for him.  He requires a walker that he could stand up straight.  He has discussed this with providers previously.  He does not want to use his over the counter card to purchase as he needs it for other items.  He is active with remote patient monitoring (RPM).  His most recent metrics:        Offered patient enrollment in the Remote Patient Monitoring (RPM) program for in-home monitoring: Yes, patient enrolled; current status is activated and monitoring.     Assessments Completed:   COPD Assessment    Does the patient understand envrionmental exposure?: Yes  Is the patient able to verbalize Rescue vs. Long Acting medications?: Yes  Does the patient have a nebulizer?: Yes  Does the patient use a space with inhaled medications?: No     No patient-reported symptoms         Symptoms:  None: Yes      Symptom course: stable          Medications Reviewed:   Patient denies any changes with medications and reports taking all medications as prescribed.    Advance Care Planning:   Not reviewed during this call     Care Planning:   Education Documentation  Discuss COPD Overview, taught by Darryl Car RN at 2025 12:38 PM.  Learner: Patient  Readiness: Acceptance  Method: Explanation  Response: Verbalizes

## 2025-02-18 ENCOUNTER — TELEPHONE (OUTPATIENT)
Dept: PRIMARY CARE CLINIC | Age: 70
End: 2025-02-18

## 2025-02-18 NOTE — TELEPHONE ENCOUNTER
Patient was wondering if physician wants him to have any blood work done before next visit in April. Patient asked if we could give him a call if physician orders any blood work.

## 2025-02-19 DIAGNOSIS — N18.2 STAGE 2 CHRONIC KIDNEY DISEASE: Primary | ICD-10-CM

## 2025-02-19 DIAGNOSIS — E78.2 MIXED HYPERLIPIDEMIA: ICD-10-CM

## 2025-02-19 DIAGNOSIS — I10 PRIMARY HYPERTENSION: ICD-10-CM

## 2025-02-27 ENCOUNTER — CARE COORDINATION (OUTPATIENT)
Dept: CASE MANAGEMENT | Age: 70
End: 2025-02-27

## 2025-02-27 NOTE — CARE COORDINATION
Hello, Please see an important message from your RPM Team:  This is a reminder that we have not received your readings for two days please enter them as soon as possible.     Please do not respond to this message; If you have a question or concern please call your Ambulatory Care Manager or your Primary Care Physician.      Savannah Crisostomo LPN    [phone]  [facility] / [facility] Coordinator / RPM

## 2025-03-14 ENCOUNTER — CARE COORDINATION (OUTPATIENT)
Dept: CASE MANAGEMENT | Age: 70
End: 2025-03-14

## 2025-03-14 NOTE — CARE COORDINATION
Hello, Please see an important message from your RPM Team:  This is a reminder that we have not received your readings for two days please enter them as soon as possible.     Please do not respond to this message; If you have a question or concern please call your Ambulatory Care Manager or your Primary Care Physician.      Savannah Crisostomo LPN    [phone]  [facility] / [facility] Coordinator / RPM

## 2025-03-19 ENCOUNTER — CARE COORDINATION (OUTPATIENT)
Dept: CASE MANAGEMENT | Age: 70
End: 2025-03-19

## 2025-03-19 DIAGNOSIS — I10 PRIMARY HYPERTENSION: ICD-10-CM

## 2025-03-19 DIAGNOSIS — N18.2 CKD (CHRONIC KIDNEY DISEASE) STAGE 2, GFR 60-89 ML/MIN: Chronic | ICD-10-CM

## 2025-03-19 NOTE — CARE COORDINATION
-Remote Alert Monitoring Note  Date/Time:  3/19/2025 3:40 PM  Patient Current Location: [place]  Verified patients name and  as identifiers.    Rpm alert to be reviewed by the provider   red alert  pulse ox heart rate (123)  Vitals Recheck pulse ox heart rate ( )  Additional needs to be addressed by provider: No         LPN contacted patient's Sister Mirian by telephone regarding red alert received   Background: COPD, HTN  Refer to 911 immediately if:  Patient unresponsive or unable to provide history  Change in cognition or sudden confusion  Patient unable to respond in complete sentences  Intense chest pain/tightness  Any concern for any clinical emergency  Red Alert: Provider response time of 1 hr required for any red alert requiring intervention  Yellow Alert: Provider response time of 3hr required for any escalated yellow alert    Clinical Interventions: Reviewed and followed up on alerts and treatments-Patient has elevated PO Heart Rate of 123.      Attempted to reach patient for RPM Red Alert Call. Unable to reach patient.  Left HIPAA Compliant message on Voice Mail to Call Back. Number left on Voice mail.   Called and spoke with ER Contact SisterMirian about patient rechecking PO Heart. She will try to reach him and ask him to recheck PO HR.  Will continue to follow.     Savannah Crisostomo LPN    [phone]  [facility] / [facility] Coordinator    Plan/Follow Up: Will continue to review, monitor and address alerts with follow up based on severity of symptoms and risk factors.  **For any new or worsening symptoms or you are concerned in anyway, please contact your Provider or report to the nearest Emergency Room.**               
Emergency Room.**

## 2025-03-20 RX ORDER — LISINOPRIL 20 MG/1
20 TABLET ORAL DAILY
Qty: 90 TABLET | Refills: 0 | Status: SHIPPED | OUTPATIENT
Start: 2025-03-20

## 2025-03-20 NOTE — TELEPHONE ENCOUNTER
Name of Medication(s) Requested:  Requested Prescriptions     Pending Prescriptions Disp Refills    lisinopril (PRINIVIL;ZESTRIL) 20 MG tablet [Pharmacy Med Name: LISINOPRIL 20MG TAB] 90 tablet 0     Sig: Take 1 tablet by mouth daily       Medication is on current medication list Yes    Dosage and directions were verified? Yes    Quantity verified: 90 day supply     Pharmacy Verified?  Yes    Last Appointment:  10/9/2024    Future appts:  Future Appointments   Date Time Provider Department Center   4/15/2025  9:00 AM Megan Lawton MD [facility] DEP   4/21/2025  9:30 AM [facility] [place] [facility]   7/11/2025 10:00 AM Curt Mcclendon, DO [facility]        (If no appt send self scheduling link. .REFILLAPPT)  Scheduling request sent?     [] Yes  [] No    Does patient need updated?  [] Yes  [] No

## 2025-03-24 ENCOUNTER — CARE COORDINATION (OUTPATIENT)
Dept: CASE MANAGEMENT | Age: 70
End: 2025-03-24

## 2025-03-24 NOTE — CARE COORDINATION
Hello, Please see an important message from your RPM Team:  This is a reminder that we have not received your readings for two days please enter them as soon as possible.     Please do not respond to this message; If you have a question or concern please call your Ambulatory Care Manager or your Primary Care Physician.      Savannah Crisostomo LPN    [phone]  Sabas Ackerman / [facility] Coordinator / RPM

## 2025-03-26 ENCOUNTER — CARE COORDINATION (OUTPATIENT)
Dept: CASE MANAGEMENT | Age: 70
End: 2025-03-26

## 2025-03-26 NOTE — CARE COORDINATION
Date/Time:  3/26/2025 1:07 PM  LPN attempted to reach patient by telephone regarding  no metrics x 2 days  in remote patient monitoring program. Left HIPAA compliant message requesting a return call. Will attempt to reach patient again.    ROXIMITY message sent  Chucho Morales , I am a nurse with the remote patient monitoring team;  reaching out to you today to remind you to please take your vitals. Important: Please try to take your vitals each day before 12pm. This ensures the monitoring team will have time to connect with your providers and get back to you with any new orders or instructions.

## 2025-04-02 ENCOUNTER — CARE COORDINATION (OUTPATIENT)
Dept: CARE COORDINATION | Age: 70
End: 2025-04-02

## 2025-04-02 NOTE — CARE COORDINATION
4/2/2025 1:13 PM  *  Unable to Reach Date/Time:  4/2/2025 1:13 PM  LPN attempted to reach patient by telephone regarding yellow alert in remote patient monitoring program. Left HIPAA compliant message requesting a return call. Will attempt to reach patient again.      Systolic Blood Pressure Increase Above 170 mmHg         Called [phone], left vm   13:32 called [phone], left vm   Emergency Contact: No ER Contact    14:23 Notify ACM

## 2025-04-04 ENCOUNTER — CARE COORDINATION (OUTPATIENT)
Dept: CARE COORDINATION | Age: 70
End: 2025-04-04

## 2025-04-04 NOTE — CARE COORDINATION
Ambulatory Care Coordination Note     4/4/2025 1:09 PM     Patient outreach attempt by this ACM today to perform care management follow up . ACM was unable to reach the patient by telephone today;   left voice message requesting a return phone call to this ACM.     ACM: Darryl Car RN     Care Summary Note: na    PCP/Specialist follow up:   Future Appointments         Provider Specialty Dept Phone    4/15/2025 9:00 AM Megan Lawton MD Primary Care [phone]    4/21/2025 9:30 AM [facility] Radiology [phone]    7/11/2025 10:00 AM Curt Mcclendon, DO Otolaryngology [phone]            Follow Up:   Plan for next AC outreach in approximately 1 week to complete:  - disease specific assessments  - goal progression  - RPM.

## 2025-04-07 ENCOUNTER — CARE COORDINATION (OUTPATIENT)
Dept: CARE COORDINATION | Age: 70
End: 2025-04-07

## 2025-04-07 NOTE — CARE COORDINATION
Curt Mcclendon, DO Otolaryngology [phone]            Follow Up:   Plan for next ACM outreach in approximately 3 weeks to complete:  - disease specific assessments  - RPM  - probable graduation at next contact .   Patient  is agreeable to this plan.

## 2025-04-08 DIAGNOSIS — N18.2 STAGE 2 CHRONIC KIDNEY DISEASE: ICD-10-CM

## 2025-04-08 DIAGNOSIS — E78.2 MIXED HYPERLIPIDEMIA: ICD-10-CM

## 2025-04-08 DIAGNOSIS — I10 PRIMARY HYPERTENSION: ICD-10-CM

## 2025-04-08 LAB
ALBUMIN: 4.3 G/DL (ref 3.5–5.2)
ALP BLD-CCNC: 65 U/L (ref 40–129)
ALT SERPL-CCNC: 18 U/L (ref 0–40)
ANION GAP SERPL CALCULATED.3IONS-SCNC: 17 MMOL/L (ref 7–16)
AST SERPL-CCNC: 17 U/L (ref 0–39)
BILIRUB SERPL-MCNC: 1.1 MG/DL (ref 0–1.2)
BUN BLDV-MCNC: 23 MG/DL (ref 6–23)
CALCIUM SERPL-MCNC: 9.9 MG/DL (ref 8.6–10.2)
CHLORIDE BLD-SCNC: 107 MMOL/L (ref 98–107)
CHOLESTEROL, TOTAL: 167 MG/DL
CO2: 20 MMOL/L (ref 22–29)
CREAT SERPL-MCNC: 1.4 MG/DL (ref 0.7–1.2)
GFR, ESTIMATED: 56 ML/MIN/1.73M2
GLUCOSE BLD-MCNC: 86 MG/DL (ref 74–99)
HDLC SERPL-MCNC: 35 MG/DL
LDL CHOLESTEROL: 110 MG/DL
POTASSIUM SERPL-SCNC: 4.5 MMOL/L (ref 3.5–5)
SODIUM BLD-SCNC: 144 MMOL/L (ref 132–146)
TOTAL PROTEIN: 7.2 G/DL (ref 6.4–8.3)
TRIGL SERPL-MCNC: 108 MG/DL
VLDLC SERPL CALC-MCNC: 22 MG/DL

## 2025-04-08 RX ORDER — AZELASTINE 1 MG/ML
2 SPRAY, METERED NASAL 2 TIMES DAILY
Qty: 120 ML | Refills: 4 | Status: SHIPPED | OUTPATIENT
Start: 2025-04-08

## 2025-04-13 SDOH — ECONOMIC STABILITY: FOOD INSECURITY: WITHIN THE PAST 12 MONTHS, YOU WORRIED THAT YOUR FOOD WOULD RUN OUT BEFORE YOU GOT MONEY TO BUY MORE.: SOMETIMES TRUE

## 2025-04-13 SDOH — ECONOMIC STABILITY: TRANSPORTATION INSECURITY
IN THE PAST 12 MONTHS, HAS THE LACK OF TRANSPORTATION KEPT YOU FROM MEDICAL APPOINTMENTS OR FROM GETTING MEDICATIONS?: NO

## 2025-04-13 SDOH — ECONOMIC STABILITY: INCOME INSECURITY: IN THE LAST 12 MONTHS, WAS THERE A TIME WHEN YOU WERE NOT ABLE TO PAY THE MORTGAGE OR RENT ON TIME?: NO

## 2025-04-13 SDOH — HEALTH STABILITY: PHYSICAL HEALTH: ON AVERAGE, HOW MANY MINUTES DO YOU ENGAGE IN EXERCISE AT THIS LEVEL?: 10 MIN

## 2025-04-13 SDOH — ECONOMIC STABILITY: FOOD INSECURITY: WITHIN THE PAST 12 MONTHS, THE FOOD YOU BOUGHT JUST DIDN'T LAST AND YOU DIDN'T HAVE MONEY TO GET MORE.: NEVER TRUE

## 2025-04-13 SDOH — HEALTH STABILITY: PHYSICAL HEALTH: ON AVERAGE, HOW MANY DAYS PER WEEK DO YOU ENGAGE IN MODERATE TO STRENUOUS EXERCISE (LIKE A BRISK WALK)?: 0 DAYS

## 2025-04-13 ASSESSMENT — LIFESTYLE VARIABLES
HOW MANY STANDARD DRINKS CONTAINING ALCOHOL DO YOU HAVE ON A TYPICAL DAY: 98
HOW MANY STANDARD DRINKS CONTAINING ALCOHOL DO YOU HAVE ON A TYPICAL DAY: PATIENT DECLINED
HOW OFTEN DO YOU HAVE SIX OR MORE DRINKS ON ONE OCCASION: 98
HOW OFTEN DO YOU HAVE A DRINK CONTAINING ALCOHOL: PATIENT DECLINED
HOW OFTEN DO YOU HAVE A DRINK CONTAINING ALCOHOL: 98

## 2025-04-13 ASSESSMENT — PATIENT HEALTH QUESTIONNAIRE - PHQ9
SUM OF ALL RESPONSES TO PHQ QUESTIONS 1-9: 0
2. FEELING DOWN, DEPRESSED OR HOPELESS: NOT AT ALL
SUM OF ALL RESPONSES TO PHQ QUESTIONS 1-9: 0
1. LITTLE INTEREST OR PLEASURE IN DOING THINGS: NOT AT ALL
SUM OF ALL RESPONSES TO PHQ QUESTIONS 1-9: 0
SUM OF ALL RESPONSES TO PHQ QUESTIONS 1-9: 0

## 2025-04-15 ENCOUNTER — OFFICE VISIT (OUTPATIENT)
Dept: PRIMARY CARE CLINIC | Age: 70
End: 2025-04-15
Payer: MEDICARE

## 2025-04-15 VITALS
DIASTOLIC BLOOD PRESSURE: 78 MMHG | OXYGEN SATURATION: 94 % | WEIGHT: 305.7 LBS | HEART RATE: 71 BPM | SYSTOLIC BLOOD PRESSURE: 128 MMHG | HEIGHT: 78 IN | BODY MASS INDEX: 35.37 KG/M2 | TEMPERATURE: 97.7 F

## 2025-04-15 DIAGNOSIS — Z21 HIV INFECTION, UNSPECIFIED SYMPTOM STATUS (HCC): ICD-10-CM

## 2025-04-15 DIAGNOSIS — Z86.73 HISTORY OF CVA (CEREBROVASCULAR ACCIDENT): ICD-10-CM

## 2025-04-15 DIAGNOSIS — D35.2 PITUITARY ADENOMA (HCC): ICD-10-CM

## 2025-04-15 DIAGNOSIS — J44.9 COPD WITHOUT EXACERBATION (HCC): Chronic | ICD-10-CM

## 2025-04-15 DIAGNOSIS — Z00.00 INITIAL MEDICARE ANNUAL WELLNESS VISIT: Primary | ICD-10-CM

## 2025-04-15 DIAGNOSIS — Z87.891 PERSONAL HISTORY OF TOBACCO USE: ICD-10-CM

## 2025-04-15 DIAGNOSIS — Z00.00 ANNUAL WELLNESS VISIT: ICD-10-CM

## 2025-04-15 DIAGNOSIS — N18.31 STAGE 3A CHRONIC KIDNEY DISEASE (HCC): ICD-10-CM

## 2025-04-15 DIAGNOSIS — Z87.891 HISTORY OF TOBACCO ABUSE: ICD-10-CM

## 2025-04-15 DIAGNOSIS — E22.0 ACROMEGALY (HCC): ICD-10-CM

## 2025-04-15 DIAGNOSIS — E78.2 MIXED HYPERLIPIDEMIA: ICD-10-CM

## 2025-04-15 DIAGNOSIS — Z91.81 AT HIGH RISK FOR FALLS: ICD-10-CM

## 2025-04-15 DIAGNOSIS — N40.0 BENIGN PROSTATIC HYPERPLASIA WITHOUT LOWER URINARY TRACT SYMPTOMS: ICD-10-CM

## 2025-04-15 PROCEDURE — G0438 PPPS, INITIAL VISIT: HCPCS | Performed by: INTERNAL MEDICINE

## 2025-04-15 PROCEDURE — 1160F RVW MEDS BY RX/DR IN RCRD: CPT | Performed by: INTERNAL MEDICINE

## 2025-04-15 PROCEDURE — G8427 DOCREV CUR MEDS BY ELIG CLIN: HCPCS | Performed by: INTERNAL MEDICINE

## 2025-04-15 PROCEDURE — 1036F TOBACCO NON-USER: CPT | Performed by: INTERNAL MEDICINE

## 2025-04-15 PROCEDURE — 3074F SYST BP LT 130 MM HG: CPT | Performed by: INTERNAL MEDICINE

## 2025-04-15 PROCEDURE — G8417 CALC BMI ABV UP PARAM F/U: HCPCS | Performed by: INTERNAL MEDICINE

## 2025-04-15 PROCEDURE — 1126F AMNT PAIN NOTED NONE PRSNT: CPT | Performed by: INTERNAL MEDICINE

## 2025-04-15 PROCEDURE — 99213 OFFICE O/P EST LOW 20 MIN: CPT | Performed by: INTERNAL MEDICINE

## 2025-04-15 PROCEDURE — G0296 VISIT TO DETERM LDCT ELIG: HCPCS | Performed by: INTERNAL MEDICINE

## 2025-04-15 PROCEDURE — 3017F COLORECTAL CA SCREEN DOC REV: CPT | Performed by: INTERNAL MEDICINE

## 2025-04-15 PROCEDURE — 3078F DIAST BP <80 MM HG: CPT | Performed by: INTERNAL MEDICINE

## 2025-04-15 PROCEDURE — 3023F SPIROM DOC REV: CPT | Performed by: INTERNAL MEDICINE

## 2025-04-15 PROCEDURE — 1159F MED LIST DOCD IN RCRD: CPT | Performed by: INTERNAL MEDICINE

## 2025-04-15 PROCEDURE — 1123F ACP DISCUSS/DSCN MKR DOCD: CPT | Performed by: INTERNAL MEDICINE

## 2025-04-15 RX ORDER — FLUTICASONE FUROATE, UMECLIDINIUM BROMIDE AND VILANTEROL TRIFENATATE 100; 62.5; 25 UG/1; UG/1; UG/1
1 POWDER RESPIRATORY (INHALATION) DAILY
Qty: 1 EACH | Refills: 2 | Status: SHIPPED | OUTPATIENT
Start: 2025-04-15

## 2025-04-15 RX ORDER — TAMSULOSIN HYDROCHLORIDE 0.4 MG/1
0.4 CAPSULE ORAL DAILY
COMMUNITY
Start: 2025-03-21

## 2025-04-15 RX ORDER — PANTOPRAZOLE SODIUM 40 MG/1
40 TABLET, DELAYED RELEASE ORAL EVERY MORNING
COMMUNITY
Start: 2025-01-14

## 2025-04-15 NOTE — PATIENT INSTRUCTIONS
[facility] Food Resources*  (Call [facility] for more resources)     HELP NETWORK OF [facility]:  What they do: Provides 24-hr, 7 days a week access to information on community resources for food & clothing help for [facility] AND [facility]  Phone: 211 or 861-316-2029  Text “HELP NETWORK” to 234920 for local food resources  DEPARTMENT OF JOB AND FAMILY SERVICES:  What they do: SNAP, provide a card to use like cash to purchase healthy foods at approved retailers  [place] Benefits Phone Number: 1-123.591.8781   [facility] DJFS: [address] [place] [address]  Phone: [phone]   [facility] DJFS: [address] [place] [address]  Phone: [phone]  [facility] DJFS: [address] [place] [address]  Phone: [phone]  Website: [email or web address]:  [address] [place] [address]  What they offer: Food and clothing distribution on Tuesdays from 9 am to 12pm & Thursdays from 4pm to 7pm.   Phone Number: [phone] ext. 503  Website: [email or web address]  [facility]: [address] [place] [address]  What they offer: food and clothing  Phone Number: [phone]  [facility]: [address] [place] [address]  Phone Number: [phone]  [facility]: [address] [place] [address]  Phone Number: [phone]  [facility] [employer] [facility]  What they offer: food items that stop at various housing and community services organizations, follow a month schedule.  Call to learn more.  Phone Number: [phone]  [employer] $15.00 voucher; 1 per household per month; can request on site or call.   [facility] FAMILY SERVICE: [address] [place] [address]  What they offer: Emergency food assistance  Phone number: [phone]  Website: [employer]  [employer]:  [address] [place] [address]  What they offer: Serves breakfast and

## 2025-04-15 NOTE — PROGRESS NOTES
Medicare Annual Wellness Visit    Chucho Morales is here for Medicare AWV and 6 Month Follow-Up (Patient has recent labs on file for review. )    Assessment & Plan   Initial Medicare annual wellness visit  COPD without exacerbation (HCC)  -     [facility] Pulmonary Medicine  Stage 3a chronic kidney disease (HCC)  -     CBC with Auto Differential; Future  Acromegaly (HCC)  HIV infection, unspecified symptom status (HCC)  Pituitary adenoma (HCC)  Benign prostatic hyperplasia without lower urinary tract symptoms  At high risk for falls  History of CVA (cerebrovascular accident)  -     DME Order for (Specify) as OP  History of tobacco abuse  -     DME Order for (Specify) as OP  Mixed hyperlipidemia  -     Lipid Panel; Future  -     Comprehensive Metabolic Panel; Future  -     fluticasone-umeclidin-vilant (TRELEGY ELLIPTA) 100-62.5-25 MCG/ACT AEPB inhaler; Inhale 1 puff into the lungs daily, Disp-1 each, R-2Normal  Annual wellness visit  Personal history of tobacco use  -     VT VISIT TO DISCUSS LUNG CA SCREEN W LDCT  -     CT Lung Screen (Initial/Annual/Baseline); Future       Return for Medicare Annual Wellness Visit in 1 year.     Subjective   Patient is also here to follow-up on hypertension hyperlipidemia history of CVA COPD and recurrent falls.  He is doing relatively okay but he is being complaining about worsening of shortness of breath lately.  He has been on Breztri 2 puffs twice a day but despite that he is short of breath and very mild exertion.  We we will start him on Trelegy 100 mg 1 puff daily and discontinue Breztri.  Will also refer him to pulmonology for further management.    Blood work was discussed with patient appears within reasonable range except for chronic kidney disease with a creatinine of 1.4.  Patient was advised to increase his water intake.    He has seen urology for an elevated PSA and he had a prostate biopsy and that showed benign tissue.  He also had an MRI of the pelvis which

## 2025-04-16 ENCOUNTER — CARE COORDINATION (OUTPATIENT)
Dept: CASE MANAGEMENT | Age: 70
End: 2025-04-16

## 2025-04-16 NOTE — CARE COORDINATION
Hello, Please see an important message from your RPM Team:  This is a reminder that we have not received your readings for two days please enter them as soon as possible.     Please do not respond to this message; If you have a question or concern please call your Ambulatory Care Manager or your Primary Care Physician.     Savannah Crisostomo LPN    [phone]  Sabas Ackerman / [facility] Coordinator / RPM

## 2025-04-29 ENCOUNTER — CARE COORDINATION (OUTPATIENT)
Dept: CARE COORDINATION | Age: 70
End: 2025-04-29

## 2025-04-29 DIAGNOSIS — J44.9 COPD WITHOUT EXACERBATION (HCC): Primary | Chronic | ICD-10-CM

## 2025-04-29 DIAGNOSIS — I10 PRIMARY HYPERTENSION: ICD-10-CM

## 2025-04-29 NOTE — CARE COORDINATION
Ambulatory Care Coordination Note     2025 10:45 AM     Patient Current Location:  Home: [address] [place] [id number]     Patient contacted the ACM by telephone. Verified name and  with patient as identifiers.         ACM: Darryl Car RN     Challenges to be reviewed by the provider   Additional needs identified to be addressed with provider No  none               Method of communication with provider: none.    Utilization: Patient has not had any utilization since our last call.     Care Summary Note:         Remote Patient Monitoring Graduation      Date/Time:  2025 10:46 AM  Patient Current Location: Home: [address] [place] [address]  Patient has graduated from the Remote Patient Monitoring program on 2025.   RPM goals have been met at this time.      Patient has been provided instruction on process to return RPM equipment and RPM has been deactivated.     Patient has AC's contact information for any further questions, concerns, or needs.     Offered patient enrollment in the Remote Patient Monitoring (RPM) program for in-home monitoring: Patient being discharged from remote patient monitoring program today.     Assessments Completed:   No changes since last call    Medications Reviewed:   Completed during a previous call     Advance Care Planning:   Reviewed and current     Care Planning:   Education Documentation  No documentation found.  Education Comments  No comments found.     ,    Goals Addressed                   This Visit's Progress     COMPLETED: Conditions and Symptoms        I will schedule office visits, as directed by my provider.  I will keep my appointment or reschedule if I have to cancel.  I will notify my provider of any barriers to my plan of care.  I will follow my Zone Management tool to seek urgent or emergent care.  I will notify my provider of any symptoms that indicate a worsening of my condition.    Barriers: lack of

## 2025-04-29 NOTE — PROGRESS NOTES
Remote Patient Order Discontinued    Received request from Darryl Car RN   to discontinue order for remote patient monitoring of COPD and HTN and order completed.

## 2025-04-29 NOTE — CARE COORDINATION
Ambulatory Care Coordination Note     4/29/2025 9:43 AM     Patient outreach attempt by this ACM today to perform care management follow up . ACM was unable to reach the patient by telephone today;   left voice message requesting a return phone call to this ACM.     ACM: Darryl Car RN     Care Summary Note: na    PCP/Specialist follow up:   Future Appointments         Provider Specialty Dept Phone    6/2/2025 10:00 AM Megan Lawton MD Primary Care [phone]    7/9/2025 2:15 PM Dotty Palomares MD Pulmonology [phone]    7/11/2025 10:00 AM Curt Mcclendon, DO Otolaryngology [phone]    10/15/2025 9:00 AM Megan Lawton MD Primary Care [phone]            Follow Up:   Plan for next AC outreach in approximately 1-2 days  to complete:  - disease specific assessments  - goal progression  - RPM graduation.

## 2025-04-30 ENCOUNTER — CARE COORDINATION (OUTPATIENT)
Dept: PRIMARY CARE CLINIC | Age: 70
End: 2025-04-30

## 2025-04-30 NOTE — CARE COORDINATION
Patient Chucho Morales  04/30/25     Care Coordination  placed call to patient to arrange RPM kit  through [employer]. Left HIPAA Compliant Message     provided return and how to pack equipment in original packing via the patients voicemail if available and provided call back number should patient have questions.    Patient made aware [employer] will  equipment in 2-4 days.

## 2025-06-02 ENCOUNTER — OFFICE VISIT (OUTPATIENT)
Dept: PRIMARY CARE CLINIC | Age: 70
End: 2025-06-02

## 2025-06-02 VITALS
TEMPERATURE: 97.4 F | HEIGHT: 78 IN | OXYGEN SATURATION: 93 % | DIASTOLIC BLOOD PRESSURE: 78 MMHG | WEIGHT: 305.6 LBS | SYSTOLIC BLOOD PRESSURE: 128 MMHG | BODY MASS INDEX: 35.36 KG/M2 | HEART RATE: 80 BPM

## 2025-06-02 DIAGNOSIS — Z86.73 HISTORY OF CVA (CEREBROVASCULAR ACCIDENT): ICD-10-CM

## 2025-06-02 DIAGNOSIS — E78.2 MIXED HYPERLIPIDEMIA: ICD-10-CM

## 2025-06-02 DIAGNOSIS — N18.2 CKD (CHRONIC KIDNEY DISEASE) STAGE 2, GFR 60-89 ML/MIN: Chronic | ICD-10-CM

## 2025-06-02 DIAGNOSIS — I10 PRIMARY HYPERTENSION: ICD-10-CM

## 2025-06-02 DIAGNOSIS — N18.30 STAGE 3 CHRONIC KIDNEY DISEASE, UNSPECIFIED WHETHER STAGE 3A OR 3B CKD (HCC): ICD-10-CM

## 2025-06-02 DIAGNOSIS — E66.9 OBESITY WITH BODY MASS INDEX (BMI) OF 30.0 TO 39.9: Primary | ICD-10-CM

## 2025-06-02 DIAGNOSIS — B20 CURRENTLY ASYMPTOMATIC HIV INFECTION, WITH HISTORY OF HIV-RELATED ILLNESS (HCC): ICD-10-CM

## 2025-06-02 DIAGNOSIS — Z79.01 CHRONIC ANTICOAGULATION: ICD-10-CM

## 2025-06-02 RX ORDER — LISINOPRIL 20 MG/1
20 TABLET ORAL DAILY
Qty: 90 TABLET | Refills: 1 | Status: SHIPPED | OUTPATIENT
Start: 2025-06-02

## 2025-06-02 RX ORDER — LISINOPRIL 20 MG/1
20 TABLET ORAL DAILY
Qty: 90 TABLET | Refills: 3 | OUTPATIENT
Start: 2025-06-02

## 2025-06-02 NOTE — TELEPHONE ENCOUNTER
Name of Medication(s) Requested:  Requested Prescriptions     Pending Prescriptions Disp Refills    lisinopril (PRINIVIL;ZESTRIL) 20 MG tablet [Pharmacy Med Name: Lisinopril Oral Tablet 20 MG] 90 tablet 3     Sig: TAKE 1 TABLET EVERY DAY       Medication is on current medication list Yes    Dosage and directions were verified? Yes    Quantity verified: 90 day supply     Pharmacy Verified?  Yes    Last Appointment:  4/15/2025    Future appts:  Future Appointments   Date Time Provider Department Center   6/2/2025  1:15 PM [facility]   7/9/2025  2:15 PM [facility]   8/5/2025  8:00 AM Curt Mcclendon DO [facility]   10/15/2025  9:00 AM [facility]        (If no appt send self scheduling link. .REFILLAPPT)  Scheduling request sent?     [] Yes  [x] No    Does patient need updated?  [] Yes  [x] No

## 2025-06-02 NOTE — PROGRESS NOTES
Chief Complaint   Patient presents with    Weight Loss     Patient is here to discuss options for weight loss.        HPI:  Patient is here for follow-up of hypertension BPH COPD and obesity.  Patient wants to discuss weight loss options as he has been trying to follow a healthy diet according to him without much help.  Patient lives a sedentary life secondary to him having a history of CVA and he is using a walker and his mobility is definitely decreased.  He was counseled about a low carbohydrate diet and adding fiber in the diet.  Will refer him to bariatric center for further management..    Past Medical History, Surgical History, and Family History has been reviewed and updated.    Review of Systems:  Constitutional:  No fever, no fatigue, no chills, no headaches, no weight change  Dermatology:  No rash, no mole, no dry or sensitive skin  ENT:  No cough, no sore throat, no sinus pain, no runny nose, no ear pain  Cardiology:  No chest pain, no palpitations, no leg edema, no shortness of breath, no PND  Gastroenterology:  No dysphagia, no abdominal pain, no nausea, no vomiting, no constipation, no diarrhea, no heartburn  Musculoskeletal:  No joint pain, no leg cramps, no back pain, no muscle aches  Respiratory:  No shortness of breath, no orthopnea, no wheezing, no QU[place]NA, no hemoptysis  Urology:  No blood in the urine, no urinary frequency, no urinary incontinence, no urinary urgency, no nocturia, no dysuria    Vitals:    06/02/25 1319   BP: 128/78   Pulse: 80   Temp: 97.4 °F (36.3 °C)   TempSrc: Temporal   SpO2: 93%   Weight: (!) 138.6 kg (305 lb 9.6 oz)   Height: 1.981 m (6' 6\")       General:  Patient alert and oriented x 3, NAD, pleasant with mild expressive aphasia  HEENT:  Atraumatic, normocephalic, PERRLA, EOMI, clear conjunctiva, TMs clear, nose-clear, throat - no erythema  Neck:  Supple, no goiter, no carotid bruits, no LAD  Lungs: Mild reduction of air exchange  Heart:  RRR, no murmurs, gallops or

## 2025-06-04 ENCOUNTER — TELEPHONE (OUTPATIENT)
Age: 70
End: 2025-06-04

## 2025-06-30 ENCOUNTER — OFFICE VISIT (OUTPATIENT)
Age: 70
End: 2025-06-30
Payer: MEDICARE

## 2025-06-30 VITALS
SYSTOLIC BLOOD PRESSURE: 138 MMHG | HEART RATE: 77 BPM | DIASTOLIC BLOOD PRESSURE: 82 MMHG | WEIGHT: 291.2 LBS | TEMPERATURE: 97 F | BODY MASS INDEX: 35.46 KG/M2 | HEIGHT: 76 IN

## 2025-06-30 DIAGNOSIS — I10 PRIMARY HYPERTENSION: Primary | ICD-10-CM

## 2025-06-30 DIAGNOSIS — E66.812 CLASS 2 SEVERE OBESITY DUE TO EXCESS CALORIES WITH SERIOUS COMORBIDITY AND BODY MASS INDEX (BMI) OF 35.0 TO 35.9 IN ADULT (HCC): ICD-10-CM

## 2025-06-30 DIAGNOSIS — E66.01 CLASS 2 SEVERE OBESITY DUE TO EXCESS CALORIES WITH SERIOUS COMORBIDITY AND BODY MASS INDEX (BMI) OF 35.0 TO 35.9 IN ADULT (HCC): ICD-10-CM

## 2025-06-30 PROBLEM — F10.20 ALCOHOLISM (HCC): Status: ACTIVE | Noted: 2021-10-28

## 2025-06-30 PROBLEM — I82.511 CHRONIC DEEP VEIN THROMBOSIS (DVT) OF FEMORAL VEIN OF RIGHT LOWER EXTREMITY (HCC): Status: ACTIVE | Noted: 2025-06-30

## 2025-06-30 PROBLEM — R33.8 ACUTE RETENTION OF URINE: Status: ACTIVE | Noted: 2019-03-22

## 2025-06-30 PROBLEM — K21.9 GASTROESOPHAGEAL REFLUX DISEASE: Status: ACTIVE | Noted: 2025-06-30

## 2025-06-30 PROBLEM — J44.9 CHRONIC OBSTRUCTIVE PULMONARY DISEASE (HCC): Status: ACTIVE | Noted: 2019-05-18

## 2025-06-30 PROBLEM — F41.9 ANXIETY DISORDER: Status: ACTIVE | Noted: 2025-06-30

## 2025-06-30 PROBLEM — M47.812 CERVICAL OSTEOARTHRITIS: Status: ACTIVE | Noted: 2025-06-30

## 2025-06-30 PROBLEM — N20.0 CALCULUS OF KIDNEY: Status: ACTIVE | Noted: 2025-06-30

## 2025-06-30 PROBLEM — G62.9 NEUROPATHY: Status: ACTIVE | Noted: 2025-06-30

## 2025-06-30 PROBLEM — R25.2 MUSCLE CRAMP: Status: ACTIVE | Noted: 2025-06-30

## 2025-06-30 PROBLEM — R33.9 INCOMPLETE EMPTYING OF BLADDER DUE TO BENIGN PROSTATIC HYPERPLASIA: Status: ACTIVE | Noted: 2025-06-30

## 2025-06-30 PROBLEM — N40.1 INCOMPLETE EMPTYING OF BLADDER DUE TO BENIGN PROSTATIC HYPERPLASIA: Status: ACTIVE | Noted: 2025-06-30

## 2025-06-30 PROBLEM — H91.90 HEARING LOSS: Status: ACTIVE | Noted: 2025-06-30

## 2025-06-30 PROBLEM — H04.123 DRY EYES: Status: ACTIVE | Noted: 2021-09-01

## 2025-06-30 PROBLEM — R35.1 NOCTURIA: Status: ACTIVE | Noted: 2025-06-30

## 2025-06-30 PROBLEM — R51.9 HEADACHE: Status: ACTIVE | Noted: 2022-05-09

## 2025-06-30 PROBLEM — I87.311 CHRONIC VENOUS HYPERTENSION (IDIOPATHIC) WITH ULCER OF RIGHT LOWER EXTREMITY (CODE) (HCC): Status: ACTIVE | Noted: 2025-06-30

## 2025-06-30 PROBLEM — D35.2 PITUITARY MACROADENOMA WITH EXTRASELLAR EXTENSION (HCC): Status: ACTIVE | Noted: 2021-09-01

## 2025-06-30 PROBLEM — Z21 HUMAN IMMUNODEFICIENCY VIRUS INFECTION (HCC): Status: ACTIVE | Noted: 2021-10-28

## 2025-06-30 PROBLEM — E55.9 VITAMIN D DEFICIENCY: Status: ACTIVE | Noted: 2025-06-30

## 2025-06-30 PROBLEM — M19.90 OSTEOARTHRITIS: Status: ACTIVE | Noted: 2025-06-30

## 2025-06-30 PROBLEM — M54.50 LOW BACK PAIN: Status: ACTIVE | Noted: 2025-06-30

## 2025-06-30 PROBLEM — G62.1 ALCOHOLIC POLYNEUROPATHY: Status: ACTIVE | Noted: 2025-06-30

## 2025-06-30 PROBLEM — I82.591: Status: ACTIVE | Noted: 2025-06-30

## 2025-06-30 PROBLEM — D49.7 NEOPLASM OF PITUITARY GLAND: Status: ACTIVE | Noted: 2019-05-17

## 2025-06-30 PROBLEM — R97.20 ELEVATED PROSTATE SPECIFIC ANTIGEN (PSA): Status: ACTIVE | Noted: 2025-06-30

## 2025-06-30 PROCEDURE — 1036F TOBACCO NON-USER: CPT | Performed by: CLINICAL NURSE SPECIALIST

## 2025-06-30 PROCEDURE — 1123F ACP DISCUSS/DSCN MKR DOCD: CPT | Performed by: CLINICAL NURSE SPECIALIST

## 2025-06-30 PROCEDURE — G8417 CALC BMI ABV UP PARAM F/U: HCPCS | Performed by: CLINICAL NURSE SPECIALIST

## 2025-06-30 PROCEDURE — 99205 OFFICE O/P NEW HI 60 MIN: CPT | Performed by: CLINICAL NURSE SPECIALIST

## 2025-06-30 PROCEDURE — G8427 DOCREV CUR MEDS BY ELIG CLIN: HCPCS | Performed by: CLINICAL NURSE SPECIALIST

## 2025-06-30 PROCEDURE — 3079F DIAST BP 80-89 MM HG: CPT | Performed by: CLINICAL NURSE SPECIALIST

## 2025-06-30 PROCEDURE — 3075F SYST BP GE 130 - 139MM HG: CPT | Performed by: CLINICAL NURSE SPECIALIST

## 2025-06-30 PROCEDURE — 1159F MED LIST DOCD IN RCRD: CPT | Performed by: CLINICAL NURSE SPECIALIST

## 2025-06-30 PROCEDURE — 3017F COLORECTAL CA SCREEN DOC REV: CPT | Performed by: CLINICAL NURSE SPECIALIST

## 2025-06-30 ASSESSMENT — ENCOUNTER SYMPTOMS
BACK PAIN: 1
DIARRHEA: 0
SHORTNESS OF BREATH: 1
VOMITING: 0
COUGH: 1
CONSTIPATION: 0
NAUSEA: 0

## 2025-06-30 NOTE — PATIENT INSTRUCTIONS
Patient Instructions:    Eat on a 7\"plate, level the food off (do not mound it up) and do not go back for seconds.     Make at least one-half of the plate non-starchy vegetables.     Limit starchy vegetables and grains to 1/4 - 1/2 of the plate     Limit the entree to no more than 1/4 of the plate        Rules:  Count every calorie every day  Limit sweets to one day per month  Limit chips/crackers/pretzels/nuts/popcorn to 100-150 marcellus/day  Eliminate all sugar sweetened beverages (including fruit juice)  Limit restaurants (including fast food and food from a convenience store) to one time every two weeks while in town    Requirements:  Make sure protein intake is at least 121 grams per day (do not count protein every day; instead spot check your intake every 2-3 weeks and make sure what you think you are getting is close to accurate; consider using a protein shake if needed; these are in the pharmacy section of the stores, not the grocery section; Premier, Pure Protein and Fairlife are relatively inexpensive and taste good to most patients; other options are Nectar, Boost Max, Ensure Max, BeneProtein and GNC lean (which is lactose-free);   Nectar fruit, Premier Protein Clear, IsoPure Protein Drink, and Protein 2 O are water-based options; Quest (or Cosco, which is cheaper and is ordered on Amazon) and the Topicmarks 1 protein bars can also be used, but have less protein in them )      (Disclaimer: Dietary supplements rarely have their listed ingredients and the amount of each verified by a third party other. Sometimes they give verification for their claims to be GMO and gluten free and to be organic. However, even such verifications as these may still be untrustworthy.)      Make sure fiber intake is at least 22 grams/day. Do this in part or whole by taking 12 tablespoons of [employer] Fiber One original, plain cereal (or Skyler's All Bran Buds cereal) or 4 tablespoons of wheat dextrin powder (Benefiber or generic

## 2025-06-30 NOTE — PROGRESS NOTES
CC -   HTN, Obesity    BACKGROUND -   First visit: 06/30/2025    Obesity   Began 5-6 years ago when he had the tumor of pituitary  Initial BMI 35.32, Wt 291.2 lbs Ht 6' 4 \"  HS Grad wt 200 lbs   Lowest   wt 190 lbs   Highest  wt 305 lbs  Pattern of wt gain: gradual   Wt change past yr: lost 14 lbs past month  Most wt lost:lost   Other diets attempted: high protein, counting calories     Desire to lose weight: 10/10  Problem posed by appetite: 0 /10    Initial Diet:    Number of meals per day - 2    Number of snacks per day - 0     Meal volume - 7\" plate, no seconds healthy choice dinners     Fast food/convenience store - 0 x/week    Restaurants (not fast food) - 0 x/week   Sweets - 0 d/week   Chips - 0 d/week   Crackers/pretzels - 0 d/week   Nuts - 0 d/week   Peanut Butter - 1 d/week   Popcorn - 5 d/week puff corn 1 serving size   Dried fruit - 0 d/week   Whole fruit - 7 d/week grapes    Breakfast cereal - 0 d/week   Granola/Protein/Energy bar - 0 d/week   Sugar sweetened beverages - V8 juice    Protein - No supplements   Fiber - No supplements   Multivitamin -daily, Vit D    Exercise:    Gym membership - no     Walking - yes has stationary bike     Running - no    Resistance - no    Aerobic class - no  ______________________    [facility] -  Past Medical History:   Diagnosis Date    Arthritis     Cervical (neck) region somatic dysfunction 05/09/2022    Cervicogenic headache 05/09/2022    Chronic kidney disease     COPD (chronic obstructive pulmonary disease) ([facility])     DVT of lower extremity (deep venous thrombosis) ([facility]) 2007    post foot surgery      GERD (gastroesophageal reflux disease)     HIV (human immunodeficiency virus infection) ([facility])     Hx of blood clots     Hyperlipidemia     Hypertension     Muscle contraction headache 05/09/2022    Pituitary tumor     surgically removed 80%, being monitored    Prostate enlargement     Urinary retention 03/22/2019     Current Outpatient Medications   Medication Sig Dispense

## 2025-07-02 LAB — PSA SERPL-MCNC: 5.77 NG/ML (ref 0–4)

## 2025-07-09 ENCOUNTER — OFFICE VISIT (OUTPATIENT)
Dept: PULMONOLOGY | Age: 70
End: 2025-07-09
Payer: MEDICARE

## 2025-07-09 VITALS
DIASTOLIC BLOOD PRESSURE: 82 MMHG | TEMPERATURE: 98.2 F | OXYGEN SATURATION: 94 % | HEART RATE: 78 BPM | BODY MASS INDEX: 34.48 KG/M2 | WEIGHT: 292 LBS | SYSTOLIC BLOOD PRESSURE: 130 MMHG | HEIGHT: 77 IN

## 2025-07-09 DIAGNOSIS — J44.9 CHRONIC OBSTRUCTIVE PULMONARY DISEASE, UNSPECIFIED COPD TYPE (HCC): Primary | ICD-10-CM

## 2025-07-09 PROCEDURE — 1159F MED LIST DOCD IN RCRD: CPT | Performed by: INTERNAL MEDICINE

## 2025-07-09 PROCEDURE — 3079F DIAST BP 80-89 MM HG: CPT | Performed by: INTERNAL MEDICINE

## 2025-07-09 PROCEDURE — G8427 DOCREV CUR MEDS BY ELIG CLIN: HCPCS | Performed by: INTERNAL MEDICINE

## 2025-07-09 PROCEDURE — 3075F SYST BP GE 130 - 139MM HG: CPT | Performed by: INTERNAL MEDICINE

## 2025-07-09 PROCEDURE — 99204 OFFICE O/P NEW MOD 45 MIN: CPT | Performed by: INTERNAL MEDICINE

## 2025-07-09 PROCEDURE — G8417 CALC BMI ABV UP PARAM F/U: HCPCS | Performed by: INTERNAL MEDICINE

## 2025-07-09 PROCEDURE — 1123F ACP DISCUSS/DSCN MKR DOCD: CPT | Performed by: INTERNAL MEDICINE

## 2025-07-09 PROCEDURE — 3023F SPIROM DOC REV: CPT | Performed by: INTERNAL MEDICINE

## 2025-07-09 PROCEDURE — 3017F COLORECTAL CA SCREEN DOC REV: CPT | Performed by: INTERNAL MEDICINE

## 2025-07-09 PROCEDURE — 1036F TOBACCO NON-USER: CPT | Performed by: INTERNAL MEDICINE

## 2025-07-09 ASSESSMENT — ENCOUNTER SYMPTOMS
WHEEZING: 1
COUGH: 1
ALLERGIC/IMMUNOLOGIC NEGATIVE: 1
GASTROINTESTINAL NEGATIVE: 1
EYES NEGATIVE: 1
SHORTNESS OF BREATH: 1

## 2025-07-09 NOTE — PROGRESS NOTES
Patient to follow up with physician in 6 weeks. Orders for PFT/6 min and blood work were given to the patient to schedule.

## 2025-07-09 NOTE — PROGRESS NOTES
Progress Note    Chucho Morales  1955    CC:COPD       HPI: 69 year old male, former smoker for 40 years, 1 pack a day and quit smoking in 2019, 40 pack years of smoking. History of COPD, sob on  mild exertion and on and off cough and wheezing. He uses Breztri and Albuterol inhalers. Past history of DVT after foot surgery. No family history of lung disease, his brother has Small Cell Lung cancer. He had surgery for Pituitary Tumor.     Past Medical History:   Diagnosis Date    Arthritis     Cervical (neck) region somatic dysfunction 05/09/2022    Cervicogenic headache 05/09/2022    Chronic kidney disease     COPD (chronic obstructive pulmonary disease) ([facility])     DVT of lower extremity (deep venous thrombosis) ([facility]) 2007    post foot surgery      GERD (gastroesophageal reflux disease)     HIV (human immunodeficiency virus infection) ([facility])     Hx of blood clots     Hyperlipidemia     Hypertension     Muscle contraction headache 05/09/2022    Pituitary tumor     surgically removed 80%, being monitored    Prostate enlargement     Urinary retention 03/22/2019      Past Surgical History:   Procedure Laterality Date    ABDOMEN SURGERY  1970    colon resection d/t paralytic ileus from blunt trauma to abdomen     ANKLE SURGERY Right     APPENDECTOMY      BRAIN SURGERY  2018    Dr. Acevedo , pituary tumor resection    COLONOSCOPY      CYSTOSCOPY N/A 07/26/2023    CYSTOURETHROSCOPY WITH INSERTION OF PERMANENT ADJUSTABLE TRANSPROSTATIC IMPLANT performed by James Pavon MD at [facility] OR    FOOT SURGERY Bilateral     hammertoe    HAMMER TOE SURGERY Left 03/22/2019    CONDYLECTOMY 4TH TOE HAMMERTOE CORRECTION  5TH TOE LEFT FOOT. V TO Y SKIN PLASTY 5TH METATARSAL PHALANGEAL JOINT LEFT FOOT performed by Damien North Jr. DPM at [facility] OR    HEEL SPUR SURGERY      HERNIA REPAIR      KNEE ARTHROSCOPY Left 9/11/2023    LEFT KNEE ARTHROSCOPY MENISECTOMY AND DEBRIDEMENT CHONDROPLASTY performed by Jae Ayala DO at [facility]

## 2025-08-05 ENCOUNTER — OFFICE VISIT (OUTPATIENT)
Dept: ENT CLINIC | Age: 70
End: 2025-08-05
Payer: MEDICARE

## 2025-08-05 VITALS
HEART RATE: 74 BPM | DIASTOLIC BLOOD PRESSURE: 81 MMHG | OXYGEN SATURATION: 94 % | BODY MASS INDEX: 33.56 KG/M2 | WEIGHT: 283 LBS | SYSTOLIC BLOOD PRESSURE: 126 MMHG

## 2025-08-05 DIAGNOSIS — J34.2 DNS (DEVIATED NASAL SEPTUM): ICD-10-CM

## 2025-08-05 DIAGNOSIS — J34.3 HYPERTROPHY OF NASAL TURBINATES: ICD-10-CM

## 2025-08-05 DIAGNOSIS — J30.9 ALLERGIC RHINITIS, UNSPECIFIED SEASONALITY, UNSPECIFIED TRIGGER: Primary | ICD-10-CM

## 2025-08-05 DIAGNOSIS — J44.9 CHRONIC OBSTRUCTIVE PULMONARY DISEASE, UNSPECIFIED COPD TYPE (HCC): ICD-10-CM

## 2025-08-05 LAB
BASOPHILS ABSOLUTE: 0.07 K/UL (ref 0–0.2)
BASOPHILS RELATIVE PERCENT: 1 % (ref 0–2)
EOSINOPHILS ABSOLUTE: 0.37 K/UL (ref 0.05–0.5)
EOSINOPHILS RELATIVE PERCENT: 5 % (ref 0–6)
HCT VFR BLD CALC: 46.7 % (ref 37–54)
HEMOGLOBIN: 15.6 G/DL (ref 12.5–16.5)
IMMATURE GRANULOCYTES %: 0 % (ref 0–5)
IMMATURE GRANULOCYTES ABSOLUTE: <0.03 K/UL (ref 0–0.58)
LYMPHOCYTES ABSOLUTE: 1.73 K/UL (ref 1.5–4)
LYMPHOCYTES RELATIVE PERCENT: 22 % (ref 20–42)
MCH RBC QN AUTO: 31.8 PG (ref 26–35)
MCHC RBC AUTO-ENTMCNC: 33.4 G/DL (ref 32–34.5)
MCV RBC AUTO: 95.1 FL (ref 80–99.9)
MONOCYTES ABSOLUTE: 0.47 K/UL (ref 0.1–0.95)
MONOCYTES RELATIVE PERCENT: 6 % (ref 2–12)
NEUTROPHILS ABSOLUTE: 5.19 K/UL (ref 1.8–7.3)
NEUTROPHILS RELATIVE PERCENT: 66 % (ref 43–80)
PDW BLD-RTO: 14.4 % (ref 11.5–15)
PLATELET # BLD: 233 K/UL (ref 130–450)
PMV BLD AUTO: 9.7 FL (ref 7–12)
RBC # BLD: 4.91 M/UL (ref 3.8–5.8)
WBC # BLD: 7.9 K/UL (ref 4.5–11.5)

## 2025-08-05 PROCEDURE — 1159F MED LIST DOCD IN RCRD: CPT | Performed by: OTOLARYNGOLOGY

## 2025-08-05 PROCEDURE — G8417 CALC BMI ABV UP PARAM F/U: HCPCS | Performed by: OTOLARYNGOLOGY

## 2025-08-05 PROCEDURE — 3074F SYST BP LT 130 MM HG: CPT | Performed by: OTOLARYNGOLOGY

## 2025-08-05 PROCEDURE — 3017F COLORECTAL CA SCREEN DOC REV: CPT | Performed by: OTOLARYNGOLOGY

## 2025-08-05 PROCEDURE — G8427 DOCREV CUR MEDS BY ELIG CLIN: HCPCS | Performed by: OTOLARYNGOLOGY

## 2025-08-05 PROCEDURE — 99213 OFFICE O/P EST LOW 20 MIN: CPT | Performed by: OTOLARYNGOLOGY

## 2025-08-05 PROCEDURE — 1123F ACP DISCUSS/DSCN MKR DOCD: CPT | Performed by: OTOLARYNGOLOGY

## 2025-08-05 PROCEDURE — 1036F TOBACCO NON-USER: CPT | Performed by: OTOLARYNGOLOGY

## 2025-08-05 PROCEDURE — 3079F DIAST BP 80-89 MM HG: CPT | Performed by: OTOLARYNGOLOGY

## 2025-08-05 RX ORDER — TRIAMCINOLONE ACETONIDE 0.25 MG/G
CREAM TOPICAL
Qty: 1 EACH | Refills: 1 | Status: SHIPPED | OUTPATIENT
Start: 2025-08-05

## 2025-08-05 RX ORDER — FLUTICASONE PROPIONATE 50 MCG
2 SPRAY, SUSPENSION (ML) NASAL DAILY
Qty: 1 G | Refills: 3 | Status: SHIPPED | OUTPATIENT
Start: 2025-08-05

## 2025-08-05 ASSESSMENT — ENCOUNTER SYMPTOMS
SINUS PAIN: 0
RHINORRHEA: 0
TROUBLE SWALLOWING: 0
VOICE CHANGE: 0
SINUS PRESSURE: 0

## 2025-08-06 ENCOUNTER — TELEPHONE (OUTPATIENT)
Dept: ENT CLINIC | Age: 70
End: 2025-08-06

## 2025-08-08 ENCOUNTER — HOSPITAL ENCOUNTER (OUTPATIENT)
Dept: PULMONOLOGY | Age: 70
Discharge: HOME | End: 2025-08-08
Attending: INTERNAL MEDICINE
Payer: MEDICARE

## 2025-08-08 VITALS — HEIGHT: 76 IN | BODY MASS INDEX: 34.46 KG/M2 | WEIGHT: 283 LBS

## 2025-08-08 DIAGNOSIS — J44.9 CHRONIC OBSTRUCTIVE PULMONARY DISEASE, UNSPECIFIED COPD TYPE (HCC): ICD-10-CM

## 2025-08-08 PROCEDURE — 94726 PLETHYSMOGRAPHY LUNG VOLUMES: CPT

## 2025-08-08 PROCEDURE — 94729 DIFFUSING CAPACITY: CPT

## 2025-08-08 PROCEDURE — 94060 EVALUATION OF WHEEZING: CPT

## 2025-08-08 PROCEDURE — 94618 PULMONARY STRESS TESTING: CPT

## 2025-08-08 ASSESSMENT — 6 MINUTE WALK TEST (6MWT)
O2 SATURATION: 96
O2 SATURATION: 95
BORG FATIGUE SCALE SCORE: SOMEWHAT SEVERE
OXYGEN DEVICE: ROOM AIR
O2 SATURATION 6: 95
BORG DYSPNEA SCALE SCORE: SOMEWHAT SEVERE
HEART RATE: 111
O2 SATURATION: 94
HEART RATE: 82
O2 SATURATION 4: 95
HEART RATE: 91
HEART RATE: 98
O2 SATURATION 3: 95
DID PATIENT STOP OR PAUSE BEFORE 6 MINUTES?: NO
BLOOD PRESSURE: 130/86
% PREDICTED: 39.62
BORG FATIGUE SCALE SCORE: NOTHING AT ALL
BORG DYSPNEA SCALE SCORE: SOMEWHAT SEVERE
BLOOD PRESSURE 1: 128/82
BORG FATIGUE SCALE SCORE: SOMEWHAT SEVERE
SYMPTOMS: SOB
TOTAL DISTANCE WALKED (M): 228.6
O2 SATURATION 5: 96
BORG DYSPNEA SCALE SCORE: NOTHING AT ALL
O2 SATURATION: 95
O2 SATURATION 2: 95
ADDTIONAL O2 FLOW RATE (L/MIN): YES

## 2025-08-29 ENCOUNTER — OFFICE VISIT (OUTPATIENT)
Age: 70
End: 2025-08-29
Payer: MEDICARE

## 2025-08-29 VITALS
SYSTOLIC BLOOD PRESSURE: 138 MMHG | HEART RATE: 80 BPM | BODY MASS INDEX: 34.85 KG/M2 | HEIGHT: 76 IN | TEMPERATURE: 97.8 F | DIASTOLIC BLOOD PRESSURE: 81 MMHG | WEIGHT: 286.2 LBS

## 2025-08-29 DIAGNOSIS — E66.812 CLASS 2 SEVERE OBESITY DUE TO EXCESS CALORIES WITH SERIOUS COMORBIDITY AND BODY MASS INDEX (BMI) OF 35.0 TO 35.9 IN ADULT (HCC): ICD-10-CM

## 2025-08-29 DIAGNOSIS — I10 PRIMARY HYPERTENSION: Primary | ICD-10-CM

## 2025-08-29 DIAGNOSIS — E66.01 CLASS 2 SEVERE OBESITY DUE TO EXCESS CALORIES WITH SERIOUS COMORBIDITY AND BODY MASS INDEX (BMI) OF 35.0 TO 35.9 IN ADULT (HCC): ICD-10-CM

## 2025-08-29 PROCEDURE — 99213 OFFICE O/P EST LOW 20 MIN: CPT | Performed by: CLINICAL NURSE SPECIALIST

## 2025-08-29 PROCEDURE — G8427 DOCREV CUR MEDS BY ELIG CLIN: HCPCS | Performed by: CLINICAL NURSE SPECIALIST

## 2025-08-29 PROCEDURE — 3075F SYST BP GE 130 - 139MM HG: CPT | Performed by: CLINICAL NURSE SPECIALIST

## 2025-08-29 PROCEDURE — 3079F DIAST BP 80-89 MM HG: CPT | Performed by: CLINICAL NURSE SPECIALIST

## 2025-08-29 PROCEDURE — G8417 CALC BMI ABV UP PARAM F/U: HCPCS | Performed by: CLINICAL NURSE SPECIALIST

## 2025-08-29 PROCEDURE — 1159F MED LIST DOCD IN RCRD: CPT | Performed by: CLINICAL NURSE SPECIALIST

## 2025-08-29 PROCEDURE — 3017F COLORECTAL CA SCREEN DOC REV: CPT | Performed by: CLINICAL NURSE SPECIALIST

## 2025-08-29 PROCEDURE — 1123F ACP DISCUSS/DSCN MKR DOCD: CPT | Performed by: CLINICAL NURSE SPECIALIST

## 2025-08-29 PROCEDURE — 1036F TOBACCO NON-USER: CPT | Performed by: CLINICAL NURSE SPECIALIST

## 2025-08-29 ASSESSMENT — ENCOUNTER SYMPTOMS
NAUSEA: 0
SHORTNESS OF BREATH: 1
VOMITING: 0
CONSTIPATION: 1

## (undated) DEVICE — PACK,EXTREMITY,ORTHOMAX,5/CS: Brand: MEDLINE

## (undated) DEVICE — BANDAGE,GAUZE,BULKEE II,4.5"X4.1YD,STRL: Brand: MEDLINE

## (undated) DEVICE — GAUZE,SPONGE,4"X4",16PLY,STRL,LF,10/TRAY: Brand: MEDLINE

## (undated) DEVICE — AMIENT MEGAVAC 90 WAND: Brand: COBLATION

## (undated) DEVICE — PAD POS ARTHSCP DISP PIVOTPOST

## (undated) DEVICE — BANDAGE COMPR W6INXL12FT SMOOTH FOR LIMB EXSANG ESMARCH

## (undated) DEVICE — GOWN,SIRUS,FABRNF,XL,20/CS: Brand: MEDLINE

## (undated) DEVICE — COVER,LIGHT HANDLE,FLX,1/PK: Brand: MEDLINE INDUSTRIES, INC.

## (undated) DEVICE — HOOK LOCK LATEX FREE ELASTIC BANDAGE 3INX5YD

## (undated) DEVICE — NEEDLE SPNL 22GA L7IN BLK HUB S STL W/ QNCKE PNT W/OUT

## (undated) DEVICE — WIPES SKIN CLOTH READYPREP 9 X 10.5 IN 2% CHLORHEX GLUCONATE CHG PREOP

## (undated) DEVICE — 12 ML SYRINGE,LUER-LOCK TIP: Brand: MONOJECT

## (undated) DEVICE — TOWEL,OR,DSP,ST,BLUE,STD,6/PK,12PK/CS: Brand: MEDLINE

## (undated) DEVICE — PAD BIOP 1X1IN SPONGE

## (undated) DEVICE — TUBING PMP L16FT MAIN DISP FOR AR-6400 AR-6475

## (undated) DEVICE — PEN: MARKING STD 100/CS: Brand: MEDICAL ACTION INDUSTRIES

## (undated) DEVICE — GOWN,SIRUS,POLYRNF,RAGLAN,XL,ST,30/CS: Brand: MEDLINE

## (undated) DEVICE — GLOVE ORTHO 8   MSG9480

## (undated) DEVICE — DRAPE 84X54IN RADIOLOGY C ARM KEYBOARD

## (undated) DEVICE — CATHETER F BLLN 5CC 18FR 2 W HYDRGEL COAT LESS TRAUM LUB

## (undated) DEVICE — GLOVE SURG 7.5 LTX TRIFLEX WIDE FINGER PWDR

## (undated) DEVICE — GLOVE SURG 7 LTX STRL TRIFLEX LNG FINGER

## (undated) DEVICE — SKN PREP SPNG STKS PVP PNT STR: Brand: MEDLINE INDUSTRIES, INC.

## (undated) DEVICE — SYRINGE BLB 50CC IRRIG PLIABLE FNGR FLNG GRAD FLSK DISP

## (undated) DEVICE — BLADE REPROCESS SAG MICRO 100 MICRO E 4.5X15.0X.40MM

## (undated) DEVICE — BASIC DOUBLE BASIN 2-LF: Brand: MEDLINE INDUSTRIES, INC.

## (undated) DEVICE — SHEET,DRAPE,40X58,STERILE: Brand: MEDLINE

## (undated) DEVICE — 1810 FOAM BLOCK NEEDLE COUNTER: Brand: DEVON

## (undated) DEVICE — GOWN,SIRUS,NON REINFRCD,LARGE,SET IN SL: Brand: MEDLINE

## (undated) DEVICE — DRESSING ALG W2XL5IN ANTIMIC WND JUMPSTART

## (undated) DEVICE — INTENDED FOR TISSUE SEPARATION, AND OTHER PROCEDURES THAT REQUIRE A SHARP SURGICAL BLADE TO PUNCTURE OR CUT.: Brand: BARD-PARKER ® STAINLESS STEEL BLADES

## (undated) DEVICE — SOLUTION IRRIG 1000ML STRL H2O USP PLAS POUR BTL

## (undated) DEVICE — DRESSING GZ XRFRM 4X4(25/BX 6BX/CS)

## (undated) DEVICE — PADDING,UNDERCAST,COTTON, 4"X4YD STERILE: Brand: MEDLINE

## (undated) DEVICE — 4-PORT MANIFOLD: Brand: NEPTUNE 2

## (undated) DEVICE — SUTURE ETHLN SZ 4-0 L18IN NONABSORBABLE BLK L19MM PS-2 3/8 1667H

## (undated) DEVICE — BLADE,STAINLESS-STEEL,11,STRL,DISPOSABLE: Brand: MEDLINE

## (undated) DEVICE — BLADE SHVR AGRSV + RED BL 4.0MM

## (undated) DEVICE — SUTURE MCRYL SZ 3-0 L27IN ABSRB UD L26MM SH 1/2 CIR Y416H

## (undated) DEVICE — SOLUTION IRRIG 1000ML 09% SOD CHL USP PIC PLAS CONTAINER

## (undated) DEVICE — SYRINGE MED 10ML LUERLOCK TIP W/O SFTY DISP

## (undated) DEVICE — 3.75 MM INTEGRATED CABLE WAND ICW,                                    SUPER MULTIVAC 50 WITH INTEGRATED                                    FINGER SWITCHES IFS, 50 DEGREE: Brand: COBLATION

## (undated) DEVICE — HANDLE CVR PATENTED RETENTION DISC STRL LIGHT SHLD

## (undated) DEVICE — DRESSING HYDROFIBER AQUACEL AG ADVANTAGE 3.5X12 IN

## (undated) DEVICE — CHLORAPREP 26ML ORANGE

## (undated) DEVICE — Z CONVERTED USE 2275207 CLOTH PREP W7.5XL7.5IN 2% CHG SKIN ALC AND RNS FREE

## (undated) DEVICE — GARMENT,MEDLINE,DVT,INT,CALF,MED, GEN2: Brand: MEDLINE

## (undated) DEVICE — STANDARD HYPODERMIC NEEDLE,ALUMINUM HUB: Brand: MONOJECT

## (undated) DEVICE — DRAPE,REIN 53X77,STERILE: Brand: MEDLINE

## (undated) DEVICE — TIBURON EXTREMITY SHEET: Brand: CONVERTORS

## (undated) DEVICE — CUFF TOURNIQUET 18 SNG BLADDER DUAL PORT

## (undated) DEVICE — NEEDLE SPNL L3.5IN PNK HUB S STL REG WALL FIT STYL W/ QNCKE

## (undated) DEVICE — BLADE,STAINLESS-STEEL,15,STRL,DISPOSABLE: Brand: MEDLINE

## (undated) DEVICE — CYSTO PACK: Brand: MEDLINE INDUSTRIES, INC.

## (undated) DEVICE — MEDI-VAC NON-CONDUCTIVE SUCTION TUBING: Brand: CARDINAL HEALTH

## (undated) DEVICE — GOWN SURG XL SMS FAB NONREINFORCED RAGLAN SLV HK LOOP CLSR

## (undated) DEVICE — MARKER,SKIN,WI/RULER AND LABELS: Brand: MEDLINE

## (undated) DEVICE — GEL US 20GM NONIRRITATING OVERWRAPPED FILE PCH TRNSMIT

## (undated) DEVICE — ENCORE® LATEX TEXTURED SIZE 7.5, STERILE LATEX POWDER-FREE SURGICAL GLOVE: Brand: ENCORE

## (undated) DEVICE — CAP PROTCT GRN REFIL FOR 0.054-0.062IN WIRE W SER PIN BALL

## (undated) DEVICE — APPLICATOR MEDICATED 26 CC SOLUTION HI LT ORNG CHLORAPREP

## (undated) DEVICE — GAUZE,SPONGE,4"X4",16PLY,XRAY,STRL,LF: Brand: MEDLINE

## (undated) DEVICE — NEEDLE HYPO 25GA L1.5IN BLU POLYPR HUB S STL REG BVL STR

## (undated) DEVICE — SOLUTION IV IRRIG POUR BRL 0.9% SODIUM CHL 2F7124

## (undated) DEVICE — MEDICINE CUP, GRADUATED, STER: Brand: MEDLINE

## (undated) DEVICE — GOWN,SIRUS,POLYRNF,BRTHSLV,XLN/XXL,18/CS: Brand: MEDLINE

## (undated) DEVICE — PAD,ABDOMINAL,8"X10",ST,LF: Brand: MEDLINE

## (undated) DEVICE — BANDAGE COMPR W6INXL5YD SELF ADH COHESIVE CO FLX

## (undated) DEVICE — NEEDLE HYPO 18GA L1.5IN PNK POLYPR HUB S STL THN WALL FILL

## (undated) DEVICE — ZIMMER® STERILE DISPOSABLE TOURNIQUET CUFF WITH PROTECTIVE SLEEVE AND PLC, DUAL PORT, SINGLE BLADDER, 34 IN. (86 CM)

## (undated) DEVICE — BANDAGE COMPR W6INXL5YD WHT BGE POLY COT M E WRP WV HK AND

## (undated) DEVICE — SOLUTION IRRIG 3000ML 0.9% SOD CHL USP UROMATIC PLAS CONT

## (undated) DEVICE — STANDARD SURGICAL GOWN, L: Brand: CONVERTORS

## (undated) DEVICE — NDL CNTR 40CT FM MAG: Brand: MEDLINE INDUSTRIES, INC.

## (undated) DEVICE — TOWEL OR BLUEE 16X26IN ST 8 PACK ORB08 16X26ORTWL

## (undated) DEVICE — GLOVE ORANGE PI 8   MSG9080

## (undated) DEVICE — MAX-CORE® DISPOSABLE CORE BIOPSY INSTRUMENT, 18G X 25CM: Brand: MAX-CORE

## (undated) DEVICE — TUBING, SUCTION, 1/4" X 10', STRAIGHT: Brand: MEDLINE

## (undated) DEVICE — SOLUTION IRRIG 3000ML STRL H2O USP UROMATIC PLAS CONT

## (undated) DEVICE — HYPODERMIC SAFETY NEEDLE: Brand: MAGELLAN

## (undated) DEVICE — BANDAGE COMPR M W6INXL10YD WHT BGE VELC E MTRX HK AND LOOP

## (undated) DEVICE — 3M™ STERI-DRAPE™ U-DRAPE 1015: Brand: STERI-DRAPE™

## (undated) DEVICE — BASIC PACK: Brand: CONVERTORS

## (undated) DEVICE — JELLY,LUBE,STERILE,FLIP TOP,TUBE,2-OZ: Brand: MEDLINE